# Patient Record
Sex: MALE | Race: WHITE | Employment: UNEMPLOYED | URBAN - METROPOLITAN AREA
[De-identification: names, ages, dates, MRNs, and addresses within clinical notes are randomized per-mention and may not be internally consistent; named-entity substitution may affect disease eponyms.]

---

## 2017-01-24 ENCOUNTER — TELEPHONE (OUTPATIENT)
Dept: PSYCHIATRY | Facility: CLINIC | Age: 22
End: 2017-01-24

## 2017-01-24 ENCOUNTER — OFFICE VISIT (OUTPATIENT)
Dept: PSYCHIATRY | Facility: CLINIC | Age: 22
End: 2017-01-24
Attending: PSYCHIATRY & NEUROLOGY
Payer: COMMERCIAL

## 2017-01-24 ENCOUNTER — OFFICE VISIT (OUTPATIENT)
Dept: PSYCHIATRY | Facility: CLINIC | Age: 22
End: 2017-01-24
Attending: PSYCHOLOGIST
Payer: COMMERCIAL

## 2017-01-24 VITALS — SYSTOLIC BLOOD PRESSURE: 106 MMHG | HEART RATE: 85 BPM | DIASTOLIC BLOOD PRESSURE: 74 MMHG | WEIGHT: 192.8 LBS

## 2017-01-24 DIAGNOSIS — F20.9 SCHIZOPHRENIA, UNSPECIFIED TYPE (H): Primary | ICD-10-CM

## 2017-01-24 PROCEDURE — 99212 OFFICE O/P EST SF 10 MIN: CPT | Mod: ZF

## 2017-01-24 NOTE — TELEPHONE ENCOUNTER
On 12/14/2016 the patient signed an JONNY authorizing Abhilash Lorenz MD to release/obtain all pertinent records for the purpose of continuing care. On 01/24/2017 I sent this JONNY to Medical Records via the [employer] system.  Ivonne Ball LPN

## 2017-01-24 NOTE — PROGRESS NOTES
First Episode Group       Client: Jasen Guillen III  : 1995  MRN: [id number]  Date of Service: 2017  Diagnosis: Mr. Guillen is being seen for a diagnosis of psychosis  Number of Participants: 3  Group Time: 60 minutes  Facilitators: Umu Bell, Ph. D., Howard Brizuela, Ph.D., Grace Stearns M.AMartin    The first episode group is based on the cognitive behavioral therapy model that uses psychoeducation, cognitive restructuring, problem solving techniques, and social skills.     Diagnostic criteria for group participation: History of psychosis    Group Topic for Today: Today we talked about delusions. Patients discussed the definition and different types of delusions and shared their experiences of delusions. The group explored how delusions may be an extension of common false beliefs and a reaction to emotions including anxiety and depression.    Description: Active Participant  Mr. Guillen was at ease and engaged other patients.    Plan: Continue with group goals to minimize impact of psychotic symptoms and maintain stability             The group will continue to talk about delusions and reality testing next week.

## 2017-01-24 NOTE — PROGRESS NOTES
"PSYCHIATRIC  DIAGNOSTIC  EVALUATION            90 minute evaluation    IDENTIFICATION   Jasen Guillen III is a 21 year old male who was self-referred for evaluation of schizophrenia.  History was provided by patient who was a good historian.       CHIEF COMPLAINT    \"I recently moved here and I have some questions about my medications.\"     HISTORY OF PRESENT ILLNESS     PSYCH CRITICAL ITEM HISTORY includes a history of psychotic symptoms (delusions of reference) starting at around age 18, with a diagnosis of schizophrenia made at the [facility] in [place]. He has been in successful treatment since that time and recently moved to the  to begin studies here at the [facility] in [employer]. Currently being treated with Invega sustenna-- dose unknown, last dose on Jan 8, plus invega 3 mg po qd and prn Cogentin for oculogyric spasms which occer 1-2 times per month.    PERTINENT BACKGROUND:      Pt began having functional difficulties in freshman year, was unable to complete college courses, and dropped out in sophomore year.. Difficulties appear to have been in part due to slowly growing delusional beliefs that songs, movies, ads, etc. Were making direct reference to him and communicating with him. Pt moved back in with parents who then became increasingly concerned  bout son's behavior. He was evaluated (psychiatrically and medically) at [facility] in [place], [place] .  He was hospitalized and evaluated for 1 week when he turned 20, and then entered residential treatment in [place] for one year where he stabilized on meds, and was engaged in group and individual psychotherapy.    MOST RECENT HISTORY began at around age 18, with sx apparently emerging slowly prior to that.    SUBSTANCE USE:     Denies all.  Financial Support- parents.  Living Situation- In dorm  Feels Safe at Home- yes.    MEDICAL ROS:  Reports occasional oculogyric symptoms, about once or twice per month, usually controlled by " "benztropine.  Denies muscle stiffness, Parkinsonian symptoms..    RECENT SYMPTOMS   [PSYCH ROS]     PANIC ATTACK OR PANIC SX:  None.  ANXIETY: None.  DEPRESSION:  DENIES- depressed mood, insomnia , appetite change, weight gain /loss, excessive guilt and feeling worthless   However, does note feelings of lack of motivation and lowered energy, though feels this might be due to meds and or schizoprhenia, rather than depression.  DYSREGULATION:  DENIES- over reactive, physically agitated, aggression, angry outbursts and violent behavior  PSYCHOSIS:  Patient reports that he had a past history of delusional beliefs, does not specify content today. Denies current delusional thinking; denies hallucinations, denies paranoia.  JOSE/HYPOMANIA:  Patient denies elevated mood, grandiosity, or excessive energy/activities.  Sleeping and eati g normally.  TRAUMA RELATED: No trauma sx.  EATING DISORDER:  No binge eating or food restriction.  COMPULSIVE:  Denies compulsive or repetitive behaviors.       PSYCHIATRIC HISTORY     SIB [method, most recent]-None  Suicidal Ideation Hx [passive, active]-None  Suicide Attempt [#, recent, method]:   #- N/A   Most Recent- N/A    Violence/Aggression Hx- none  Psychosis Hx- none  Psych Hosp See history noted above-- 1 prior hospitalization in [place] for \"not functioning at home and my parents wanted this figured out.\"    Outpatient Programs [ DBT, Day Treatment, Eating Disorder Tx etc] : Patient participated in a year-long residential treatment program in [place].    PAST MEDICATION TRIALS     Patient was stabilized on paliperidone palmitaie (Invega sustenna) during RTP and has been on it for the past year at a does of 234 mg per month supplemented by oral paliperidone at 3 mg po qd.      SUBSTANCE USE HISTORY                                                                 None    Legal Consequences- none    SOCIAL HISTORY                                                                      " patient reported   Financial Support- documented above  Living Situation/Family/Relationships- documented above  Trauma History (self-report)- None  Legal- None  Social/Spiritual Support-Patient has very good support from his family in [place] as well as Grand parents here.  Is developing a good relationship with his new roommate/suitemates. Also is involved in [employer] group here on campus.      FAMILY HISTORY                                                                       patient reported     Family Mental Health History-  unknown    MEDICAL / SURGICAL HISTORY                                   None    Neurologic Hx: unknown-- data to be gathered at next visit.   There is no problem list on file for this patient.         ALLERGY                                Review of patient's allergies indicates no known allergies.  MEDICATIONS                               No current outpatient prescriptions on file.       VITALS   /74 mmHg  Pulse 85  Wt 87.454 kg (192 lb 12.8 oz)   MENTAL STATUS EXAM                                                             Alertness: alert   Appearance: well groomed  Behavior/Demeanor: cooperative, pleasant and calm, with good  eye contact  Speech: normal and regular rate and rhythm  Language: intact  Psychomotor: normal or unremarkable  Mood:  description consistent with euthymia  Affect: full range; was congruent to mood;congruent to content  Thought Process/Associations: unremarkable, organized.  Thought Content:  Denies suicidal ideation, violent ideation and psychotic thought  Perception:  Denies auditory or visual hallucinations.  Insight: excellent  Judgment: excellent  Cognition:  does appear grossly intact; formal cognitive testing was not done    LABS and DATA   None performed.    AIMS:  N/A    PHQ9 TODAY = Not entered yet.    Psychotherapy services during this visit included myself and Jasen Guillen III.    Issues addressed in therapy included psychoeducation ab  out this phase of treatment, and discussion of non-medication treatment approaches to support recovery. They were addressed using primarily Supportive techniques.    Treatment plan was initiated at this visit, please see further documentation scanned into Epic chart.    Total time spent on psychotherapy services: 20 min    PSYCHIATRIC DIAGNOSES                                                                                                    Schizophrenia, early phase, in full recovery and doing very well overall, fully engaged in current treatment plan, with mild occasional oculogyric spasms secondary to antipsychotic medication.     ASSESSMENT                                           See 'Plan' section for specific dosing info             This patient is a 21 year old male who provides a history and with  mental status findings supporting the diagnoses listed directly above.  Further diagnostic clarification is not needed.  There are no medical comorbidities which impact this treatment.    Patient is very engaged and motivated to follow treatment recommendations, and interested in working with our interdisciplinary team to consolidate his reiovery.         PLAN                                                                                                       1) MEDICATION:      We will try and d/c oral paliperidone, to see if patient can be maintained with HARRELL only-- goal is to eliminate oculogyric sx.  Patient is agreable to this plan.  KNows to call us if psychotic sx re-emerge.    Will return in one week to assess how he is doing.    2) THERAPY:     Patient met with Howard Mcmahon, PhD, and discussed joining our Young Adults group. He will also consider individual psychotherapy.    He also met with Darrick MANRIQUE, and discussed developing his support system and relapse plan-- at next visit.    4) REFERRALS [CD, medical, other]:  none    5) :  none    6) RTC: One week.    7) CRISIS NUMBERS:  Provided routinely in AVS         TREATMENT RISK STATEMENT:  The risks, benefits, alternatives and potential adverse effects have been explained and are understood by the pt. The pt agrees to the treatment plan with the ability to do so. The pt knows to call the clinic for any problems or to access emergency care if needed.  Medical and CD concerns are documented above.  Psychotropic drug interaction check was done, including changes made today, and is discussed above.         Faculty: Lynnette Rios MD

## 2017-01-24 NOTE — TELEPHONE ENCOUNTER
On 01/23/2017 the patient signed a PHI authorizing phone messages to be left on cell phone ([phone]) regarding scheduling, medical and billing information.  The form also authorizes Person to Person communication with Jasen Guillen ., Father for information.  I sent this document to scanning on 01/24/2017 and kept a copy in Psychiatry until scanning is complete/confirmed. Ivonne Ball LPN

## 2017-01-25 DIAGNOSIS — F20.9 SCHIZOPHRENIA (H): Primary | ICD-10-CM

## 2017-01-25 ASSESSMENT — PATIENT HEALTH QUESTIONNAIRE - PHQ9: SUM OF ALL RESPONSES TO PHQ QUESTIONS 1-9: 9

## 2017-01-25 NOTE — PROGRESS NOTES
Pt is cleared to receive Invega Sustenna injection by [facility] staff, Karolina Jessica.  Will have injection appt scheduled for next Tuesday, 1/31/17 to coincide with his next psychiatry appt per Dr Rios's request.

## 2017-01-25 NOTE — PROGRESS NOTES
Received response from Dr Rios after [facility] contacted previous psychiatrist for verification of dose to enter prescription for Invega Sustenna 234 mg q 29 days, 3 R/F.  Notified Karolina Jessica that order is now present so insurances can be checked for coverage.

## 2017-01-27 PROBLEM — F20.9 SCHIZOPHRENIA, UNSPECIFIED TYPE (H): Status: ACTIVE | Noted: 2017-01-27

## 2017-01-31 ENCOUNTER — OFFICE VISIT (OUTPATIENT)
Dept: PSYCHIATRY | Facility: CLINIC | Age: 22
End: 2017-01-31
Attending: PSYCHIATRY & NEUROLOGY
Payer: COMMERCIAL

## 2017-01-31 ENCOUNTER — ALLIED HEALTH/NURSE VISIT (OUTPATIENT)
Dept: PSYCHIATRY | Facility: CLINIC | Age: 22
End: 2017-01-31

## 2017-01-31 ENCOUNTER — OFFICE VISIT (OUTPATIENT)
Dept: PSYCHIATRY | Facility: CLINIC | Age: 22
End: 2017-01-31
Attending: PSYCHOLOGIST
Payer: COMMERCIAL

## 2017-01-31 VITALS — SYSTOLIC BLOOD PRESSURE: 109 MMHG | HEART RATE: 70 BPM | DIASTOLIC BLOOD PRESSURE: 75 MMHG | WEIGHT: 190 LBS

## 2017-01-31 DIAGNOSIS — F20.9 SCHIZOPHRENIA, UNSPECIFIED TYPE (H): Primary | ICD-10-CM

## 2017-01-31 DIAGNOSIS — Z71.89 COUNSELING AND COORDINATION OF CARE: Primary | ICD-10-CM

## 2017-01-31 PROCEDURE — 99212 OFFICE O/P EST SF 10 MIN: CPT | Mod: ZF

## 2017-01-31 NOTE — PROGRESS NOTES
First Episode Group       Client: Jasen Guillen III  : 1995  MRN: [id number]  Date of Service: 2017  Diagnosis: Mr. Wilmer MAURICIO is being seen for a diagnosis of psychosis  Number of Participants: 8  Group Time: 60 minutes  Facilitators: Roque Hernández, Howard JEFFERS, Ph.D., Grace Stearns, M.A.    The first episode group is based on the cognitive behavioral therapy model that uses psychoeducation, cognitive restructuring, problem solving techniques, and social skills.     Diagnostic criteria for group participation: History of psychosis    Group Topic for Today: Today we talked about reality testing. Patients shared their methods of reality testing including checking in with trusted ones, not acting upon a delusion and seeing what happens, avoiding over interpretation of hallucinations and incidences, etc.     Description: Active Participant  Mr. Guillen was at ease and engaged other group members.    Plan: Continue with group goals to minimize impact of psychotic symptoms and maintain stability      I saw the patient with the psychotherapy trainee and participated in the service.  I agree with the findings and plan as documented in this note.    Fina Ceron

## 2017-01-31 NOTE — NURSING NOTE
Chief Complaint   Patient presents with     Recheck Medication     schizophrenia     Reviewed allergies, smoking status, and pharmacy preference  Administered abuse screening questions   Obtained weight, blood pressure and heart rate

## 2017-01-31 NOTE — MR AVS SNAPSHOT
After Visit Summary   2017    Jasen Guillen III    MRN: [id number]           Patient Information     Date Of Birth          1995        Visit Information        Provider Department      2017 10:30 AM Lynnette Rios MD Psychiatry Clinic        Today's Diagnoses     Schizophrenia, unspecified type (H)    -  1       Follow-ups after your visit        Your next 10 appointments already scheduled     Mar 14, 2017 10:10 AM CDT   First Episode Return with Lynnette Rios MD   Psychiatry Clinic ([facility])    [place] [id number]  [address] [id number] [phone]              Who to contact     Please call your clinic at [phone] to:    Ask questions about your health    Make or cancel appointments    Discuss your medicines    Learn about your test results    Speak to your doctor   If you have compliments or concerns about an experience at your clinic, or if you wish to file a complaint, please contact [facility] Patient Relations at [phone] or email us at [email or web address]         Additional Information About Your Visit        MyChart Information     Medik[employer]z is an electronic gateway that provides easy, online access to your medical records. With Medikidz, you can request a clinic appointment, read your test results, renew a prescription or communicate with your care team.     To sign up for [employer] visit the website at [email or web address]   You will be asked to enter the access code listed below, as well as some personal information. Please follow the directions to create your username and password.     Your access code is: [email or web address]  Expires: 2017 10:48 AM     Your access code will  in 90 days. If you need help or a new code, please contact your [facility] or call [phone] for assistance.        Care EveryWhere ID     This  is your Care EveryWhere ID. This could be used by other organizations to access your [place] medical records  [id number]        Your Vitals Were     Pulse                   70            Blood Pressure from Last 3 Encounters:   01/31/17 109/75   01/24/17 106/74    Weight from Last 3 Encounters:   01/31/17 86.2 kg (190 lb)   01/24/17 87.5 kg (192 lb 12.8 oz)              Today, you had the following     No orders found for display       Primary Care Provider    None       No address on file        Thank you!     Thank you for choosing PSYCHIATRY CLINIC  for your care. Our goal is always to provide you with excellent care. Hearing back from our patients is one way we can continue to improve our services. Please take a few minutes to complete the written survey that you may receive in the mail after your visit with us. Thank you!             Your Updated Medication List - Protect others around you: Learn how to safely use, store and throw away your medicines at www.disposemymeds.org.          This list is accurate as of: 1/31/17 11:59 PM.  Always use your most recent med list.                   Brand Name Dispense Instructions for use    paliperidone 234 MG/1.5ML Susp    INVEGA SUSTENNA    1.5 mL    Inject 1.5 mLs (234 mg) into the muscle every 28 days

## 2017-01-31 NOTE — PROGRESS NOTES
Met with Jasen (Gato) to introduce relapse prevention planning, and provided him with a set of questions to assist him in developing a first draft on his own.  He indicated he does not currently have a plan, and he has not ever completed one in the past.  Gato indicated he would like to see me again for assistance with developing, so we agreed to meet after his next appointment with Dr. Rios in two weeks.       Following our visit, the [employer] Supportive Education and  Monica Ramirez met with Gato and discussed information and resources specific to disability services and accomodations at the [employer], where he attends school.      Darrick Gomez, [facility]  Director/Family Clinician-[facility]  Department of Psychiatry-[facility]          I saw the patient with the [employer]IG[employer] team staff member, and participated in key portions of the service, including the mental status examination and developing the plan of care. I reviewed key portions of the history . I agree with the findings and plan as documented in this note.    Lynnette Rios

## 2017-02-01 ASSESSMENT — PATIENT HEALTH QUESTIONNAIRE - PHQ9: SUM OF ALL RESPONSES TO PHQ QUESTIONS 1-9: 2

## 2017-02-07 ENCOUNTER — OFFICE VISIT (OUTPATIENT)
Dept: PSYCHIATRY | Facility: CLINIC | Age: 22
End: 2017-02-07
Attending: PSYCHOLOGIST
Payer: COMMERCIAL

## 2017-02-07 DIAGNOSIS — F20.9 SCHIZOPHRENIA, UNSPECIFIED TYPE (H): Primary | ICD-10-CM

## 2017-02-07 NOTE — PROGRESS NOTES
First Episode Group       Client: Jasen Guillen III  : 1995  MRN: [id number]  Date of Service: 2017  Diagnosis: Mr. Wilmer MAURICIO is being seen for a diagnosis of schizophrenia, unspecified type, F20.9  Number of Participants: 4  Group Time: 60 minutes  Facilitators: Umu Bell, Ph. D., Howard Brizuela, Ph.D., Grace Stearns M.AMartin    The first episode group is based on the cognitive behavioral therapy model that uses psychoeducation, cognitive restructuring, problem solving techniques, and social skills.     Diagnostic criteria for group participation: History of psychosis    Group Topic for Today: Today we talked about the purposes and benefits of group therapy. Patients compared and contrasted group therapy with individual therapy and psychiatric visits. Patients shared that they appreciate the opportunity to get reassurance, relate to others, be understood, and help others in group therapy.    Description: Attentive     Mr. Guillen was engaged in the group milieu and participated in the discussion.    Plan: Continue with group goals to minimize impact of psychotic symptoms and maintain stability

## 2017-02-14 NOTE — PROGRESS NOTES
"  PSYCHIATRY CLINIC PROGRESS NOTE       INTERIM HISTORY                                                 PSYCH CRITICAL ITEM HISTORY  Jasen Guillen III is a 21 year old male who comes in for a scheduled visit today to evaluate how he is doing with a reduction in his oral paliperidone.    Patient stopped the paliperidone 3 mg po qd 2 weeks ago to see if this would help with sx of sedation, low energy, amotivation, and oculogyric spasms.    Patient reports that he feels much better over the past week or so-- energy and focus are improved, and he feels less \"drugged out\" during the day. No muscle or facial side effects.    Started [facility] group last week and participated very actively-- enjoyed it.        MEDICAL ROS:   Denies problems with salivation, muscle stiffness.    PSYCH and CD Critical Summary Points since July 2016           None.        Patient Active Problem List   Diagnosis     Schizophrenia, unspecified type (H)            ALLERGY                                Review of patient's allergies indicates no known allergies.  MEDICATIONS                               Current Outpatient Prescriptions   Medication Sig Dispense Refill     paliperidone (INVEGA SUSTENNA) 234 MG/1.5ML SUSP Inject 1.5 mLs (234 mg) into the muscle every 28 days 1.5 mL 3        VITALS   /75  Pulse 70  Wt 86.2 kg (190 lb)   MENTAL STATUS EXAM                                                             Alertness: Alert, oriented.  Appearance: Neatly and casually dressed.  Behavior/Demeanor: Calm, cooperative, pleasant, with excellent eye contact.  Speech: Normal rate and rhythm.  Language: Language is cogent, descriptive of current improvement in how he is feeling.  Psychomotor: Normal PM speed. No slowing observed.  Mood:  \"Good.\"  Affect: full range, appropriate.  Thought Process/Associations: Linear, logical, feeling optiistic about current semester.  Thought Content: Denies delusional thoughts, ideas of " "reference.  Perception:  Denies hallucinations  Insight: Excellent.  Judgment: Excellent  Cognition:  Intact    LABS and DATA         PHQ9 TODAY = 2  PHQ-9 SCORE 1/24/2017 1/31/2017   Total Score 9 2         DIAGNOSIS     Schizophrenia     ASSESSMENT                                     Patient doing well, having tapered off of oral paliperidone 3 mg po qd, will be due for monthly injection of 234 mg next week.    Is experiencing better energy, better sense of motivation, no psychotic sx.     PLAN                                                                                                       1) PSYCHOTROPIC MEDICATIONS:   Injectable paliperidone 234 mg po q month.    2) THERAPY:      Will continue Young Adults group once per month.    Interested in individual psychotherapy-- will refer to Howard Mcmahon, PhD for evaluation for individual sessions.    Will work with Darrick MANRIQUE to develop Relapse Plan.      3) RTC: Feb 7 for monthly injection.      4) CRISIS NUMBERS:   Provided routinely in AVS        TREATMENT RISK STATEMENT:  The risks, benefits, alternatives and potential adverse effects have been discussed and are understood by the patient/ patient's guardian. The pt understands the risks of using street drugs or alcohol.  There are no medical contraindications, the pt agrees to treatment with the ability to do so.  The patient understands to call 911 or come to the nearest ED if life threatening or urgent symptoms present.    PSYCHIATRY CLINIC INDIVIDUAL PSYCHOTHERAPY NOTE   Length of session: 20 minutes  Date of most recent treatment plan: 01-31-17  Date next due treatment plan: 04-30-17    Subjective: \"I am feeling much more motivated today. I would like to consider individual therapy. Classes are going well so far also.     Plan:    Continue supportive psychotherapy during monthly medication management visits.   Refer for indiviiual psychotherapy.   Continue Young Adults Group    Psychotherapy services " during this visit included  myself and Jasen Guillen.  Issues addressed in therapy included: how to strike a balance between staying active and not over-committing, now that classes are underway and he is feeling more energy--  and were addressed using primarily psychodynamic, supportive and CBT techniques.        TREATMENT  PLAN          SYMPTOMS;PROBLEMS   MEASURABLE GOALS;    FUNCTIONAL IMPROVEMENT INTERVENTIONS;    GAINS MADE DISCHARGE CRITERIA   Low energy, lack of motivation Patient will experience a significant improvement in energy and motivation, and will be able to engage in outsidesocializing activities as well as classwork Supportive Psychotherapy once per month    Individual psychotherapy, once per week Ongoing engagement with social activities   Ideas of reference during times of stress Patient will no longer experience ideas of reference Monthly injectable paliperidone Two years without symptoms, followed by two years of monitoring without symptoms relapse   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       Faculty: Lynnette Rios M.D.

## 2017-02-21 ENCOUNTER — OFFICE VISIT (OUTPATIENT)
Dept: PSYCHIATRY | Facility: CLINIC | Age: 22
End: 2017-02-21
Attending: PSYCHOLOGIST
Payer: COMMERCIAL

## 2017-02-21 DIAGNOSIS — F20.9 SCHIZOPHRENIA, UNSPECIFIED TYPE (H): Primary | ICD-10-CM

## 2017-02-21 NOTE — MR AVS SNAPSHOT
After Visit Summary   2017    Jasen Guillen III    MRN: [id number]           Patient Information     Date Of Birth          1995        Visit Information        Provider Department      2017 3:00 PM Umu Bell, PhD  Psychiatry Clinic        Today's Diagnoses     Schizophrenia, unspecified type (H)    -  1       Follow-ups after your visit        Your next 10 appointments already scheduled     Mar 14, 2017 10:10 AM CDT   First Episode Return with Lynnette Rios MD   Psychiatry Clinic ([facility])    [address]  [address] [facility] [id number] [phone]              Who to contact     Please call your clinic at [phone] to:    Ask questions about your health    Make or cancel appointments    Discuss your medicines    Learn about your test results    Speak to your doctor   If you have compliments or concerns about an experience at your clinic, or if you wish to file a complaint, please contact [facility] Patient Relations at [phone] or email us at [email or web address]         Additional Information About Your Visit        MyChart Information     Cards Offt is an electronic gateway that provides easy, online access to your medical records. With mNectar, you can request a clinic appointment, read your test results, renew a prescription or communicate with your care team.     To sign up for Cards Offt visit the website at [email or web address]   You will be asked to enter the access code listed below, as well as some personal information. Please follow the directions to create your username and password.     Your access code is: [email or web address]  Expires: 2017 10:48 AM     Your access code will  in 90 days. If you need help or a new code, please contact your [facility] or call [phone] for assistance.        Care EveryWhere  ID     This is your Care EveryWhere ID. This could be used by other organizations to access your [place] medical records  [id number]         Blood Pressure from Last 3 Encounters:   01/31/17 109/75   01/24/17 106/74    Weight from Last 3 Encounters:   01/31/17 86.2 kg (190 lb)   01/24/17 87.5 kg (192 lb 12.8 oz)              Today, you had the following     No orders found for display       Primary Care Provider    None       No address on file        Thank you!     Thank you for choosing PSYCHIATRY CLINIC  for your care. Our goal is always to provide you with excellent care. Hearing back from our patients is one way we can continue to improve our services. Please take a few minutes to complete the written survey that you may receive in the mail after your visit with us. Thank you!             Your Updated Medication List - Protect others around you: Learn how to safely use, store and throw away your medicines at www.disposemymeds.org.          This list is accurate as of: 2/21/17 11:59 PM.  Always use your most recent med list.                   Brand Name Dispense Instructions for use    paliperidone 234 MG/1.5ML Susp    INVEGA SUSTENNA    1.5 mL    Inject 1.5 mLs (234 mg) into the muscle every 28 days

## 2017-02-22 NOTE — PROGRESS NOTES
First Episode Group       Client: Jasen Guillen III  : 1995  MRN: [id number]  Date of Service: 2017  Diagnosis: Mr. Guillen is being seen for a diagnosis of psychosis  Number of Participants: 9  Group Time: 60 minutes  Facilitators: Umu Bell, Ph. D., Howard Brizuela, Ph.D., Grace Stearns M.AMartin    The first episode group is based on the cognitive behavioral therapy model that uses psychoeducation, cognitive restructuring, problem solving techniques, and social skills.     Diagnostic criteria for group participation: History of psychosis    Group Topic for Today: Today we talked about reality testing. Patients shared their coping skills for hallucinations and delusions including taking medication, checking in with trusted people, and meditation. The group practiced coming up with alternative thoughts for psychotic symptoms.    Description: Attentive     Mr. Guillen was engaged in the group milieu and participated in the discussion.    Plan: Continue with group goals to minimize impact of psychotic symptoms and maintain stability    I saw the patient with the psychotherapy trainee and participated in the service.  I agree with the findings and plan as documented in this note.    Umu Bell

## 2017-02-28 ENCOUNTER — OFFICE VISIT (OUTPATIENT)
Dept: PSYCHIATRY | Facility: CLINIC | Age: 22
End: 2017-02-28
Attending: PSYCHOLOGIST
Payer: COMMERCIAL

## 2017-02-28 DIAGNOSIS — F20.9 SCHIZOPHRENIA, UNSPECIFIED TYPE (H): Primary | ICD-10-CM

## 2017-02-28 NOTE — MR AVS SNAPSHOT
After Visit Summary   2017    Jasen Guillen III    MRN: [id number]           Patient Information     Date Of Birth          1995        Visit Information        Provider Department      2017 3:00 PM Sera Landrum, PhD Psychiatry Clinic        Today's Diagnoses     Schizophrenia, unspecified type (H)    -  1       Follow-ups after your visit        Your next 10 appointments already scheduled     Mar 14, 2017 10:10 AM CDT   First Episode Return with Lynnette Rios MD   Psychiatry Clinic ([facility])    [address]  [address] [facility] [phone]              Who to contact     Please call your clinic at [phone] to:    Ask questions about your health    Make or cancel appointments    Discuss your medicines    Learn about your test results    Speak to your doctor   If you have compliments or concerns about an experience at your clinic, or if you wish to file a complaint, please contact [facility] Patient Relations at [phone] or email us at [email or web address]         Additional Information About Your Visit        MyChart Information     WizRocket Technologies is an electronic gateway that provides easy, online access to your medical records. With WizRocket Technologies, you can request a clinic appointment, read your test results, renew a prescription or communicate with your care team.     To sign up for Metwitt visit the website at [email or web address]   You will be asked to enter the access code listed below, as well as some personal information. Please follow the directions to create your username and password.     Your access code is: [email or web address]  Expires: 2017 10:48 AM     Your access code will  in 90 days. If you need help or a new code, please contact your [facility] or call [phone] for assistance.        Care EveryWhere ID     This  is your Care EveryWhere ID. This could be used by other organizations to access your [place] medical records  [id number]         Blood Pressure from Last 3 Encounters:   01/31/17 109/75   01/24/17 106/74    Weight from Last 3 Encounters:   01/31/17 86.2 kg (190 lb)   01/24/17 87.5 kg (192 lb 12.8 oz)              Today, you had the following     No orders found for display       Primary Care Provider    None       No address on file        Thank you!     Thank you for choosing PSYCHIATRY CLINIC  for your care. Our goal is always to provide you with excellent care. Hearing back from our patients is one way we can continue to improve our services. Please take a few minutes to complete the written survey that you may receive in the mail after your visit with us. Thank you!             Your Updated Medication List - Protect others around you: Learn how to safely use, store and throw away your medicines at www.disposemymeds.org.          This list is accurate as of: 2/28/17 11:59 PM.  Always use your most recent med list.                   Brand Name Dispense Instructions for use    paliperidone 234 MG/1.5ML Susp    INVEGA SUSTENNA    1.5 mL    Inject 1.5 mLs (234 mg) into the muscle every 28 days

## 2017-03-01 NOTE — PROGRESS NOTES
First Episode Group       Client: Jasen Guillen III  : 1995  MRN: [id number]  Date of Service: 2017  Diagnosis: Mr. Wilmer MAURICIO is being seen for a diagnosis of psychosis  Number of Participants: 9  Group Time: 60 minutes  Facilitators: Roque Beckham, Howard Brizuela, Ph.D., Grace Stearns M.AMartin    The first episode group is based on the cognitive behavioral therapy model that uses psychoeducation, cognitive restructuring, problem solving techniques, and social skills.     Diagnostic criteria for group participation: History of psychosis    Group Topic for Today: Today, the group learned about mindfulness with guest speaker, [facility]. The group completed two mindfulness exercises, guided breathing and body scanning. Patients shared their experience of the exercises and discussed how one can use them to cope with psychotic symptoms as well as stress.    Description: Active Participant  Mr. Guillen was at ease and engaged in the discussion.    Plan: Continue with group goals to minimize impact of psychotic symptoms and maintain stability    I saw the patient with the psychotherapy trainee and participated in the service. I agree with the findings and plan as documented in this note. Sera Landrum Psy.D., L.P.

## 2017-03-09 ENCOUNTER — TELEPHONE (OUTPATIENT)
Dept: PSYCHIATRY | Facility: CLINIC | Age: 22
End: 2017-03-09

## 2017-03-09 NOTE — TELEPHONE ENCOUNTER
"Per review of EMR upcoming appts, writer noticed that this pt is due for Invega Sustenna injection ~3/7/17.    Per last injection note from [facility] dated 2/7/17 \"Before we could get the next injection scheduled pt had left the clinic.  Will request that scheduling connect with him to schedule next appt on 3/9/17.Next appt: Due on 3/7/17 but to accommodate nursing schedules will need to change this to 3/9/17.\"    Pt is not currently scheduled for an injection.    LVM on pt's phone to c/b.  "

## 2017-03-13 NOTE — TELEPHONE ENCOUNTER
Pt has not returned call to schedule injection.  Will likely receive this following appt tomorrow with Dr. Rios and writer will need dose confirmed prior to administering.    Msg sent to scheduling to enter injection appt.    Routed to Dr. Rios as TAVONI.

## 2017-03-14 ENCOUNTER — OFFICE VISIT (OUTPATIENT)
Dept: PSYCHIATRY | Facility: CLINIC | Age: 22
End: 2017-03-14
Attending: SOCIAL WORKER
Payer: COMMERCIAL

## 2017-03-14 ENCOUNTER — TELEPHONE (OUTPATIENT)
Dept: PSYCHIATRY | Facility: CLINIC | Age: 22
End: 2017-03-14

## 2017-03-14 ENCOUNTER — ALLIED HEALTH/NURSE VISIT (OUTPATIENT)
Dept: PSYCHIATRY | Facility: CLINIC | Age: 22
End: 2017-03-14
Attending: PSYCHIATRY & NEUROLOGY
Payer: COMMERCIAL

## 2017-03-14 VITALS — HEART RATE: 65 BPM | SYSTOLIC BLOOD PRESSURE: 108 MMHG | WEIGHT: 188.6 LBS | DIASTOLIC BLOOD PRESSURE: 70 MMHG

## 2017-03-14 DIAGNOSIS — F20.9 SCHIZOPHRENIA, UNSPECIFIED TYPE (H): Primary | ICD-10-CM

## 2017-03-14 DIAGNOSIS — Z79.899 ENCOUNTER FOR LONG-TERM (CURRENT) USE OF MEDICATIONS: ICD-10-CM

## 2017-03-14 DIAGNOSIS — F33.1 MODERATE EPISODE OF RECURRENT MAJOR DEPRESSIVE DISORDER (H): ICD-10-CM

## 2017-03-14 PROCEDURE — 25000128 H RX IP 250 OP 636: Mod: ZF

## 2017-03-14 PROCEDURE — 99212 OFFICE O/P EST SF 10 MIN: CPT

## 2017-03-14 PROCEDURE — 96372 THER/PROPH/DIAG INJ SC/IM: CPT | Mod: ZF

## 2017-03-14 NOTE — MR AVS SNAPSHOT
After Visit Summary   3/14/2017    Jasen Guillen III    MRN: [id number]           Patient Information     Date Of Birth          1995        Visit Information        Provider Department      3/14/2017 10:10 AM Lynnette Rios MD Psychiatry Clinic        Today's Diagnoses     Schizophrenia, unspecified type (H)    -  1    Moderate episode of recurrent major depressive disorder (H)           Follow-ups after your visit        Your next 10 appointments already scheduled     May 11, 2017  1:00 PM CDT   Nurse Visit with [facility] Psychiatry Nurse   Psychiatry Clinic ([facility])    [address]  [address] [facility] [address] [phone]              Who to contact     Please call your clinic at [phone] to:    Ask questions about your health    Make or cancel appointments    Discuss your medicines    Learn about your test results    Speak to your doctor   If you have compliments or concerns about an experience at your clinic, or if you wish to file a complaint, please contact [facility] Patient Relations at [phone] or email us at [email or web address]         Additional Information About Your Visit        MyChart Information     Bergen Medical Productst is an electronic gateway that provides easy, online access to your medical records. With Preventice, you can request a clinic appointment, read your test results, renew a prescription or communicate with your care team.     To sign up for Bergen Medical Productst visit the website at [email or web address]   You will be asked to enter the access code listed below, as well as some personal information. Please follow the directions to create your username and password.     Your access code is: [email or web address]  Expires: 2017 11:48 AM     Your access code will  in 90 days. If you need help or a new code, please contact your [facility] or call  [phone] for assistance.        Care EveryWhere ID     This is your Care EveryWhere ID. This could be used by other organizations to access your [place] medical records  [id number]        Your Vitals Were     Pulse                   65            Blood Pressure from Last 3 Encounters:   04/18/17 114/74   04/04/17 109/73   03/14/17 108/70    Weight from Last 3 Encounters:   04/18/17 81.9 kg (180 lb 9.6 oz)   04/04/17 83.7 kg (184 lb 9.6 oz)   03/14/17 85.5 kg (188 lb 9.6 oz)              Today, you had the following     No orders found for display       Primary Care Provider    None       No address on file        Thank you!     Thank you for choosing PSYCHIATRY CLINIC  for your care. Our goal is always to provide you with excellent care. Hearing back from our patients is one way we can continue to improve our services. Please take a few minutes to complete the written survey that you may receive in the mail after your visit with us. Thank you!             Your Updated Medication List - Protect others around you: Learn how to safely use, store and throw away your medicines at www.disposemymeds.org.          This list is accurate as of: 3/14/17 11:59 PM.  Always use your most recent med list.                   Brand Name Dispense Instructions for use    paliperidone 234 MG/1.5ML Susp    INVEGA SUSTENNA    1.5 mL    Inject 1.5 mLs (234 mg) into the muscle every 28 days

## 2017-03-14 NOTE — MR AVS SNAPSHOT
After Visit Summary   3/14/2017    Jasen Guillen III    MRN: [id number]           Patient Information     Date Of Birth          1995        Visit Information        Provider Department      3/14/2017 11:00 AM Nurse, [facility] Psychiatry Psychiatry Clinic        Today's Diagnoses     Schizophrenia, unspecified type (H)    -  1    Encounter for long-term (current) use of medications           Follow-ups after your visit        Your next 10 appointments already scheduled     2017 10:30 AM CDT   Nurse Visit with [facility] Psychiatry Nurse   Psychiatry Clinic ([facility])    [address]  [address] [facility] [phone]              Who to contact     Please call your clinic at [phone] to:    Ask questions about your health    Make or cancel appointments    Discuss your medicines    Learn about your test results    Speak to your doctor   If you have compliments or concerns about an experience at your clinic, or if you wish to file a complaint, please contact [facility] Patient Relations at [phone] or email us at [email or web address]         Additional Information About Your Visit        MyChart Information     AmigoCAT is an electronic gateway that provides easy, online access to your medical records. With AmigoCAT, you can request a clinic appointment, read your test results, renew a prescription or communicate with your care team.     To sign up for Emergency CallWorkst visit the website at [email or web address]   You will be asked to enter the access code listed below, as well as some personal information. Please follow the directions to create your username and password.     Your access code is: [email or web address]  Expires: 2017 11:48 AM     Your access code will  in 90 days. If you need help or a new code, please contact your [facility] or call [phone] for  assistance.        Care EveryWhere ID     This is your Care EveryWhere ID. This could be used by other organizations to access your [place] medical records  [id number]         Blood Pressure from Last 3 Encounters:   03/14/17 108/70   01/31/17 109/75   01/24/17 106/74    Weight from Last 3 Encounters:   03/14/17 85.5 kg (188 lb 9.6 oz)   01/31/17 86.2 kg (190 lb)   01/24/17 87.5 kg (192 lb 12.8 oz)              Today, you had the following     No orders found for display       Primary Care Provider    None       No address on file        Thank you!     Thank you for choosing PSYCHIATRY CLINIC  for your care. Our goal is always to provide you with excellent care. Hearing back from our patients is one way we can continue to improve our services. Please take a few minutes to complete the written survey that you may receive in the mail after your visit with us. Thank you!             Your Updated Medication List - Protect others around you: Learn how to safely use, store and throw away your medicines at www.disposemymeds.org.          This list is accurate as of: 3/14/17 11:48 AM.  Always use your most recent med list.                   Brand Name Dispense Instructions for use    paliperidone 234 MG/1.5ML Susp    INVEGA SUSTENNA    1.5 mL    Inject 1.5 mLs (234 mg) into the muscle every 28 days

## 2017-03-14 NOTE — MR AVS SNAPSHOT
After Visit Summary   3/14/2017    Jasen Guillen III    MRN: [id number]           Patient Information     Date Of Birth          1995        Visit Information        Provider Department      3/14/2017 9:15 AM Светлана Cardoso LGSW Psychiatry Clinic        Today's Diagnoses     Schizophrenia, unspecified type (H)    -  1       Follow-ups after your visit        Your next 10 appointments already scheduled     Apr 04, 2017 10:10 AM CDT   First Episode Return with Lynnette Rios MD   Psychiatry Clinic ([facility])    [place] [id number] [address] [facility] [phone]            Apr 13, 2017 10:30 AM CDT   Nurse [facility] with [email or web address] Psychiatry Nurse   Psychiatry Clinic ([facility])    [place] [id number] [address] [facility] [id number] [phone]              Who to contact     Please call your clinic at [phone] to:    Ask questions about your health    Make or cancel appointments    Discuss your medicines    Learn about your test results    Speak to your doctor   If you have compliments or concerns about an experience at your clinic, or if you wish to file a complaint, please contact [facility] Patient Relations at [phone] or email us at [email or web address]         Additional Information About Your Visit        MyChart Information     SoBiz10 is an electronic gateway that provides easy, online access to your medical records. With SoBiz10, you can request a clinic appointment, read your test results, renew a prescription or communicate with your care team.     To sign up for Startup Villaget visit the website at [email or web address]   You will be asked to enter the access code listed below, as well as some personal information. Please follow the directions to create your username and password.     Your access code is: [email or web address]  Expires: 4/30/2017  11:48 AM     Your access code will  in 90 days. If you need help or a new code, please contact your [facility] or call [phone] for assistance.        Care EveryWhere ID     This is your Care EveryWhere ID. This could be used by other organizations to access your [place] medical records  [id number]         Blood Pressure from Last 3 Encounters:   17 108/70   17 109/75   17 106/74    Weight from Last 3 Encounters:   17 85.5 kg (188 lb 9.6 oz)   17 86.2 kg (190 lb)   17 87.5 kg (192 lb 12.8 oz)              Today, you had the following     No orders found for display       Primary Care Provider    None       No address on file        Thank you!     Thank you for choosing PSYCHIATRY CLINIC  for your care. Our goal is always to provide you with excellent care. Hearing back from our patients is one way we can continue to improve our services. Please take a few minutes to complete the written survey that you may receive in the mail after your visit with us. Thank you!             Your Updated Medication List - Protect others around you: Learn how to safely use, store and throw away your medicines at www.disposemymeds.org.          This list is accurate as of: 3/14/17 11:59 PM.  Always use your most recent med list.                   Brand Name Dispense Instructions for use    paliperidone 234 MG/1.5ML Susp    INVEGA SUSTENNA    1.5 mL    Inject 1.5 mLs (234 mg) into the muscle every 28 days

## 2017-03-14 NOTE — NURSING NOTE
"OBSERVATIONS    Prior to administration pt identified by name and :  Yes    1.  Appearance:  Casual dress and weather appropriate. Mildly malodorous.   2.  Mood:  \"Good\"  3.  Affect:  Mildly restricted  4.  Interactions:  Appropriate.  Pt left clinic following appt with provider, reports to have forgotten about injection appt.  Discussed importance of scheduling these in advance and appt reminders.  Pt reports that visit with provider went well, denied any med changes made.    5.  Eye contact:  Good  6.  Side Effects:  Mentions oculogyric crisis occurring ~ 1 weekly. Inquired if pt was using Cogentin regularly, denies this and states that he takes it as PRN. Pt not certain if med is effective as PRN, states that it is a low dose.  Mentions that if he is to take this scheduled, will need a new prescription to [facility]. Writer stated would verify dose with provider and submit prescription.  7.  Education:  Encourage pt to call clinic with concerns PRN  8.  Level of Cooperation:  Full  9.  Compliance:  Full  10.  Next appointment:  17- injection, provider appt TBD        "

## 2017-03-14 NOTE — PROGRESS NOTES
Clinician Contact & Progress Note For Individual Resiliency Treatment (IRT)      Participant ID #: Jasen Guillen III    Date: 3/14/2017  Diagnosis: Schizophrenia, unspecified type (H) F20.9  Clinician: Светлана MANRIQUE  Other Staff Attending: BURTON Nobles      1. Type of contact: (Choose the one with majority of time spent on)     IRT Session     2. People present: (check all that apply)     Client   IRT clinicians (Pardeep)    3. Total number of persons who participated in contact: 3    4. Length of Actual Contact: 45 minutes   Record Minutes Travel Time: 0 minutes    5. Location of contact:(select from drop down menu)     Agency - [facility]    THE FOLLOWING QUESTIONS SHOULD BE COMPLETED AFTER EACH IRT SESSION    6. Did the client complete the home practice option from the previous session:  (select from drop down menu)     NA    7. Motivational Teaching Strategies:    Connect info and skills with personal goals.  Promote hope and positive expectations.  Re-frame experiences in positive light.    8. Educational Teaching Strategies:     Review of written material/education   Relate information to client's experience   Ask questions to check comprehension   Break down information into small chunks   Adopt client's language    9. CBT Teaching Strategies:    Social skills training (rationale for skill, modeling, role play practice, feedback, plan home practice)    Relapse prevention planning (review of stressors, early warning signs, written plan to respond to signs, rehearse plan)    Coping skills training (review current coping skills, increase currently used skills, model new skill, role play new skill, feedback, plan home practice)    10. Module(s) Addressed:     Module 4 - Relapse Prevention Planning      11. Techniques utilized: (choose all that apply)    X -[place] announced at beginning of session  NA- Review of homework  X -Review of goal  NA- Review of previous meeting  X - Present  new material       Role-play       Problem-solving practice  X-  Help client choose a home practice option  X -Summarize progress made in current session      12. Mental Status Exam  MENTAL STATUS: alert, interactive and alert and oriented x 3:    13. Progress Note: Writer met with client to complete Module 4, Relapse Prevention Planning. IRT clinician, BURTON Nobles, was also present. Client engaged in relapse prevention planning, identified early warning signs and triggers, developed coping strategies, and plan to share plan with Dad, psychiatrist, and friend. Client indicated social withdrawal and increase in ideas of reference as early warning signs. Reported triggers as increase in stress, stopping medications, and isolation. Identified coping skills as talking to Dad, doctor, and friend as well as taking a break by going for a walk outside. Identified people to be contacted in emergency as his Dad, Mom, and psychiatrist.     14. Plan/Referrals: Client reported plan to share his Relapse Prevention Plan with his Dad, doctor, and friend. He also reported plan to review plan regularly and make adjustments as needed.     Attestation:    I did not see this patient directly. This patient is discussed with me in individual clinical social work supervision, and I agree with the plan as documented.     HELIO Álvarez, [facility], March 22, 2017

## 2017-03-14 NOTE — TELEPHONE ENCOUNTER
Rec'isidoro ARIAS from Dr. Rios that pt is to continue on Invega Sustenna 234 mg IM Q 28 days.  Pt scheduled to receive injection following appt with provider today.

## 2017-03-14 NOTE — TELEPHONE ENCOUNTER
During injection appt, pt reports that he may need a new Rx for Cogentin.  Was uncertain of current dosing recommendations from provider.  Is currently using 0.25 mg QD PRN- oculogyric crisis.  Writer stated would verify dose with provider and assist with prescribing.  Pt reports to use [facility].    Writer attempted to reach [place] to inquire about last prescription, however pharmacy is closed.  Will c/b tomorrow.

## 2017-03-15 NOTE — TELEPHONE ENCOUNTER
Called [facility] and was informed that pt does not have a current profile as has not previously rec'd prescriptions from this location.    LVM for pt to c/b

## 2017-03-27 ENCOUNTER — TELEPHONE (OUTPATIENT)
Dept: PSYCHIATRY | Facility: CLINIC | Age: 22
End: 2017-03-27

## 2017-03-27 NOTE — TELEPHONE ENCOUNTER
"From: Renetta Racheal  Sent: Tuesday, March 21, 2017 8:26 AM  To: Gato Guillen  Subject: Re: Thank you thank you thank you!     Of course!  I am with her today.  Thank you so much for the kind words.  We hope to give Gato the support he needs to find stability.     -Renetta Springer [facility]      From: Gato Guillen <[email or web address]>  Sent: Tuesday, March 21, 2017 8:25:12 AM  To: Renetta Racheal  Subject: Re: Thank you thank you thank you!    Ms. Springer Would you mind forwarding this to Dr. Rios?  I don t have her email address.      Thank you,    Gato Guillen [address] [address]     [phone] Cell  [email or web address]       The truth is: mental illness affects more people than you may think, and we need to talk about it. It s Okay to Say    [email or web address]    This message may contain confidential information. If it has been sent to you in error, please reply to advise the sender of the error and then immediately delete this message and any attachments. Thank you         From: Gato Guillen <[email or web address]>  Date: Tuesday, March 21, 2017 at 8:19 AM  To: \"[email or web address]\" <[email or web address]>  Subject: Thank you thank you thank you!    Dr. Rios and Ms. Springer,    I wanted to thank both of you for meeting with me last Tuesday at Gato III s appointment and to thank both of you for taking such good care of him these past couple of months since he arrived in [place].  I couldn t be more pleased to know that Gato is in such good hands.  I ve heard such good things about your program and you guys have truly been an answer to our hopes and prayers.    Have a great week and keep up the good work!      Thanks,    Gato Guillen [address] [address]     [phone] " Cell  [facility].org       The truth is: mental illness affects more people than you may think, and we need to talk about it. It s Okay to Say    [email or web address]

## 2017-03-28 ENCOUNTER — OFFICE VISIT (OUTPATIENT)
Dept: PSYCHIATRY | Facility: CLINIC | Age: 22
End: 2017-03-28
Attending: PSYCHOLOGIST
Payer: COMMERCIAL

## 2017-03-28 DIAGNOSIS — F20.9 SCHIZOPHRENIA, UNSPECIFIED TYPE (H): Primary | ICD-10-CM

## 2017-03-28 NOTE — PROGRESS NOTES
First Episode Group       Client: Jasen Guillen III  : 1995  MRN: [id number]  Date of Service: 3/28/2017  Diagnosis: Mr. Guillen is being seen for a diagnosis of psychosis  Number of Participants: 7  Group Time: 60 minutes  Facilitators: Umu Bell, Ph. D., Grace Stearns MHAILEE    The first episode group is based on the cognitive behavioral therapy model that uses psychoeducation, cognitive restructuring, problem solving techniques, and social skills.     Diagnostic criteria for group participation: History of psychosis    Group Topic for Today: Today we talked about wellness, in particular physical wellness. The group discussed ways to improve physical wellness by doing exercise, setting SMART goals, and using rewards. Group members gained insight abut the role of physical wellness as an integral part of wellness in general.    Description: Active Participant  Mr. Guillen was at ease and engaged other patients.    Plan: Continue with group goals to minimize impact of psychotic symptoms and maintain stability    I saw the patient with the psychotherapy trainee and participated in the service.  I agree with the findings and plan as documented in this note.    Umu Bell

## 2017-03-28 NOTE — MR AVS SNAPSHOT
After Visit Summary   3/28/2017    Jasen Guillen III    MRN: [id number]           Patient Information     Date Of Birth          1995        Visit Information        Provider Department      3/28/2017 3:00 PM Umu Bell, PhD  Psychiatry Clinic        Today's Diagnoses     Schizophrenia, unspecified type (H)    -  1       Follow-ups after your visit        Your next 10 appointments already scheduled     Apr 04, 2017 10:10 AM CDT   First Episode Return with Lynnette Rios MD   Psychiatry Clinic ([facility])    [address] [facility] [id number] [phone]            Apr 13, 2017 10:30 AM CDT   Nurse Visit with [facility] Psychiatry Nurse   Psychiatry Clinic ([facility])    [address] [facility] [id number] [phone]              Who to contact     Please call your clinic at [phone] to:    Ask questions about your health    Make or cancel appointments    Discuss your medicines    Learn about your test results    Speak to your doctor   If you have compliments or concerns about an experience at your clinic, or if you wish to file a complaint, please contact [facility] Patient Relations at [phone] or email us at [email or web address]         Additional Information About Your Visit        MyChart Information     NanoSight is an electronic gateway that provides easy, online access to your medical records. With NanoSight, you can request a clinic appointment, read your test results, renew a prescription or communicate with your care team.     To sign up for Commerce Scien[employer]est visit the website at [email or web address]   You will be asked to enter the access code listed below, as well as some personal information. Please follow the directions to create your username and password.     Your access code is:  [email or web address]  Expires: 2017 11:48 AM     Your access code will  in 90 days. If you need help or a new code, please contact your [facility] or call [phone] for assistance.        Care EveryWhere ID     This is your Care EveryWhere ID. This could be used by other organizations to access your [place] medical records  [id number]         Blood Pressure from Last 3 Encounters:   17 108/70   17 109/75   17 106/74    Weight from Last 3 Encounters:   17 85.5 kg (188 lb 9.6 oz)   17 86.2 kg (190 lb)   17 87.5 kg (192 lb 12.8 oz)              Today, you had the following     No orders found for display       Primary Care Provider    None       No address on file        Thank you!     Thank you for choosing PSYCHIATRY CLINIC  for your care. Our goal is always to provide you with excellent care. Hearing back from our patients is one way we can continue to improve our services. Please take a few minutes to complete the written survey that you may receive in the mail after your visit with us. Thank you!             Your Updated Medication List - Protect others around you: Learn how to safely use, store and throw away your medicines at www.disposemymeds.org.          This list is accurate as of: 3/28/17 11:59 PM.  Always use your most recent med list.                   Brand Name Dispense Instructions for use    paliperidone 234 MG/1.5ML Susp    INVEGA SUSTENNA    1.5 mL    Inject 1.5 mLs (234 mg) into the muscle every 28 days

## 2017-04-04 ENCOUNTER — OFFICE VISIT (OUTPATIENT)
Dept: PSYCHIATRY | Facility: CLINIC | Age: 22
End: 2017-04-04
Attending: PSYCHIATRY & NEUROLOGY
Payer: COMMERCIAL

## 2017-04-04 ENCOUNTER — TELEPHONE (OUTPATIENT)
Dept: PSYCHIATRY | Facility: CLINIC | Age: 22
End: 2017-04-04

## 2017-04-04 VITALS — HEART RATE: 73 BPM | WEIGHT: 184.6 LBS | DIASTOLIC BLOOD PRESSURE: 73 MMHG | SYSTOLIC BLOOD PRESSURE: 109 MMHG

## 2017-04-04 DIAGNOSIS — F32.A DEPRESSION, UNSPECIFIED DEPRESSION TYPE: Primary | ICD-10-CM

## 2017-04-04 PROCEDURE — 99212 OFFICE O/P EST SF 10 MIN: CPT | Mod: ZF

## 2017-04-04 RX ORDER — SERTRALINE HYDROCHLORIDE 25 MG/1
TABLET, FILM COATED ORAL
Qty: 120 TABLET | Refills: 0 | Status: SHIPPED | OUTPATIENT
Start: 2017-04-04 | End: 2017-04-19

## 2017-04-04 NOTE — NURSING NOTE
Chief Complaint   Patient presents with     Recheck Medication     schizophrenia, unspecified type     Reviewed allergies, smoking status, and pharmacy preference  Administered abuse screening questions   Obtained weight, blood pressure and heart rate

## 2017-04-04 NOTE — MR AVS SNAPSHOT
After Visit Summary   2017    Jasen Guillen III    MRN: [id number]           Patient Information     Date Of Birth          1995        Visit Information        Provider Department      2017 10:10 AM Lynnette Rios MD Psychiatry Clinic        Today's Diagnoses     Depression, unspecified depression type    -  1       Follow-ups after your visit        Who to contact     Please call your clinic at [phone] to:    Ask questions about your health    Make or cancel appointments    Discuss your medicines    Learn about your test results    Speak to your doctor   If you have compliments or concerns about an experience at your clinic, or if you wish to file a complaint, please contact [facility] Patient Relations at [phone] or email us at [email or web address]         Additional Information About Your Visit        MyChart Information     Stockdrift is an electronic gateway that provides easy, online access to your medical records. With Stockdrift, you can request a clinic appointment, read your test results, renew a prescription or communicate with your care team.     To sign up for Lotour.comt visit the website at [email or web address]   You will be asked to enter the access code listed below, as well as some personal information. Please follow the directions to create your username and password.     Your access code is: [email or web address]  Expires: 2017  3:30 PM     Your access code will  in 90 days. If you need help or a new code, please contact your [facility] or call [phone] for assistance.        Care EveryWhere ID     This is your Care EveryWhere ID. This could be used by other organizations to access your [place] medical records  [id number]        Your Vitals Were     Pulse                   73            Blood Pressure from Last 3 Encounters:   17 114/74   17 109/73   17  108/70    Weight from Last 3 Encounters:   04/18/17 81.9 kg (180 lb 9.6 oz)   04/04/17 83.7 kg (184 lb 9.6 oz)   03/14/17 85.5 kg (188 lb 9.6 oz)              Today, you had the following     No orders found for display         Today's Medication Changes          These changes are accurate as of: 4/4/17 11:59 PM.  If you have any questions, ask your nurse or doctor.               Start taking these medicines.        Dose/Directions    sertraline 25 MG tablet   Commonly known as:  ZOLOFT   Used for:  Depression, unspecified depression type   Started by:  Lynnette Rios MD        Start with 25 mg po qd, then increase by 25 mg po qd until at a dose of 100 mg po qd   Quantity:  120 tablet   Refills:  0            Where to get your medicines      These medications were sent to [address] [address]     Phone:  [phone]     sertraline 25 MG tablet                Primary Care Provider    None       No address on file        Thank you!     Thank you for choosing PSYCHIATRY CLINIC  for your care. Our goal is always to provide you with excellent care. Hearing back from our patients is one way we can continue to improve our services. Please take a few minutes to complete the written survey that you may receive in the mail after your visit with us. Thank you!             Your Updated Medication List - Protect others around you: Learn how to safely use, store and throw away your medicines at www.disposemymeds.org.          This list is accurate as of: 4/4/17 11:59 PM.  Always use your most recent med list.                   Brand Name Dispense Instructions for use    paliperidone 234 MG/1.5ML Susp    INVEGA SUSTENNA    1.5 mL    Inject 1.5 mLs (234 mg) into the muscle every 28 days       sertraline 25 MG tablet    ZOLOFT    120 tablet    Start with 25 mg po qd, then increase by 25 mg po qd until at a dose of 100 mg po qd

## 2017-04-04 NOTE — LETTER
April 4, 2017      RE: Jasen Guillen III         To Whomever It May Concern    Gato is currently under my care for the treatment of Schizophrenia. Please be aware that Gato missed his exam yesterday due to side effects he was experiencing from medication necessary to treat his symptoms. Please allow him to re-schedule this exam and allow for flexibility in allotted time for future exams. We may request further accommodations as needed.        Sincerely,      Lynnette Rios MD

## 2017-04-11 ENCOUNTER — OFFICE VISIT (OUTPATIENT)
Dept: PSYCHIATRY | Facility: CLINIC | Age: 22
End: 2017-04-11
Attending: PSYCHOLOGIST
Payer: COMMERCIAL

## 2017-04-11 DIAGNOSIS — F29 PSYCHOSIS, UNSPECIFIED PSYCHOSIS TYPE (H): Primary | ICD-10-CM

## 2017-04-11 NOTE — MR AVS SNAPSHOT
After Visit Summary   4/11/2017    Jasen Guillen III    MRN: [id number]           Patient Information     Date Of Birth          1995        Visit Information        Provider Department      4/11/2017 3:00 PM Umu Bell, PhD  Psychiatry Clinic        Today's Diagnoses     Psychosis, unspecified psychosis type    -  1       Follow-ups after your visit        Your next 10 appointments already scheduled     Apr 13, 2017 10:30 AM CDT   Nurse Visit with [facility] Psychiatry Nurse   Psychiatry Clinic ([facility])    [place] [id number] [facility] [phone]            Apr 18, 2017 10:00 AM CDT   First Episode Return with Lynnette Rios MD   Psychiatry Clinic ([facility])    [address] [facility] [phone]              Who to contact     Please call your clinic at [phone] to:    Ask questions about your health    Make or cancel appointments    Discuss your medicines    Learn about your test results    Speak to your doctor   If you have compliments or concerns about an experience at your clinic, or if you wish to file a complaint, please contact [facility] Physicians Patient Relations at [phone] or email us at [email or web address]         Additional Information About Your Visit        MyChart Information     Plumbeet is an electronic gateway that provides easy, online access to your medical records. With Picklify, you can request a clinic appointment, read your test results, renew a prescription or communicate with your care team.     To sign up for Plumbeet visit the website at [email or web address]   You will be asked to enter the access code listed below, as well as some personal information. Please follow the directions to create your username and password.     Your access code is:  [email or web address]  Expires: 2017 11:48 AM     Your access code will  in 90 days. If you need help or a new code, please contact your [facility] Clinic or call [phone] for assistance.        Care EveryWhere ID     This is your Care EveryWhere ID. This could be used by other organizations to access your [place] medical records  [id number]         Blood Pressure from Last 3 Encounters:   17 109/73   17 108/70   17 109/75    Weight from Last 3 Encounters:   17 83.7 kg (184 lb 9.6 oz)   17 85.5 kg (188 lb 9.6 oz)   17 86.2 kg (190 lb)              Today, you had the following     No orders found for display       Primary Care Provider    None       No address on file        Thank you!     Thank you for choosing PSYCHIATRY CLINIC  for your care. Our goal is always to provide you with excellent care. Hearing back from our patients is one way we can continue to improve our services. Please take a few minutes to complete the written survey that you may receive in the mail after your visit with us. Thank you!             Your Updated Medication List - Protect others around you: Learn how to safely use, store and throw away your medicines at www.disposemymeds.org.          This list is accurate as of: 17 11:59 PM.  Always use your most recent med list.                   Brand Name Dispense Instructions for use    paliperidone 234 MG/1.5ML Susp    INVEGA SUSTENNA    1.5 mL    Inject 1.5 mLs (234 mg) into the muscle every 28 days       sertraline 25 MG tablet    ZOLOFT    120 tablet    Start with 25 mg po qd, then increase by 25 mg po qd until at a dose of 100 mg po qd

## 2017-04-12 ENCOUNTER — TELEPHONE (OUTPATIENT)
Dept: PSYCHIATRY | Facility: CLINIC | Age: 22
End: 2017-04-12

## 2017-04-12 NOTE — PROGRESS NOTES
First Episode Group         Client: Jasen Guillen  : 1995  MRN: [id number]  Date of Service: 2017  Diagnosis: Mr. Guillen is being seen for a diagnosis of psychosis  Number of Participants: 8  Group Time: 60 minutes  Facilitators: Howard Mcmahon Ph.D., Umu Bell, Ph. D.     The first episode group is based on the cognitive behavioral therapy model that uses psychoeducation, cognitive restructuring, problem solving techniques, and social skills.      Diagnostic criteria for group participation: History of psychosis     Group Topic for Today: Today we discussed the ways we conceptualize and talk about our psychosis symptoms/diagnosis. The group explored conceptions of psychosis in the media, stigma, and the power and risks of disclosure.     Description: Cooperative  Mr. Guillen was engaged in the group milieu.      Plan: Continue with group goals to minimize impact of psychotic symptoms and maintain stability    I was present for and actively participated in the entire group therapy session, my observations of Jasen Guillen III s progress and participation are that he actively participated.    Umu Bell

## 2017-04-13 ENCOUNTER — ALLIED HEALTH/NURSE VISIT (OUTPATIENT)
Dept: PSYCHIATRY | Facility: CLINIC | Age: 22
End: 2017-04-13
Attending: PSYCHIATRY & NEUROLOGY
Payer: COMMERCIAL

## 2017-04-13 ENCOUNTER — TELEPHONE (OUTPATIENT)
Dept: PSYCHIATRY | Facility: CLINIC | Age: 22
End: 2017-04-13

## 2017-04-13 DIAGNOSIS — F29 PSYCHOSIS, UNSPECIFIED PSYCHOSIS TYPE (H): Primary | ICD-10-CM

## 2017-04-13 DIAGNOSIS — F20.9 SCHIZOPHRENIA (H): Primary | ICD-10-CM

## 2017-04-13 DIAGNOSIS — Z79.899 ENCOUNTER FOR LONG-TERM (CURRENT) USE OF MEDICATIONS: ICD-10-CM

## 2017-04-13 PROCEDURE — 25000128 H RX IP 250 OP 636: Mod: ZF

## 2017-04-13 PROCEDURE — 96372 THER/PROPH/DIAG INJ SC/IM: CPT | Mod: ZF

## 2017-04-13 RX ORDER — BENZTROPINE MESYLATE 0.5 MG/1
0.25 TABLET ORAL 2 TIMES DAILY PRN
Qty: 30 TABLET | Refills: 0 | Status: SHIPPED | OUTPATIENT
Start: 2017-04-13 | End: 2017-04-19

## 2017-04-13 NOTE — TELEPHONE ENCOUNTER
Per lydia Schneider to submit Rx.    Attempt to call pt with update but phone went to  and  full.

## 2017-04-13 NOTE — TELEPHONE ENCOUNTER
----- Message from Ruma Pino sent at 4/13/2017  1:46 PM CDT -----  Regarding: Meds/Elizabethv  Contact: [phone]  Caller:  Pt    Medication:  Cogentin 0.5mg 1/2 tablet 2x daily, PRN    Pharmacy and location:  [facility]    Have you contacted the pharmacy: No- script has not been written by [facility] before    How much of medication do you have left:  3 days    Pending appt: Yes    Okay to leave VM:  Yes- he'd appreciate a call when it's been sent

## 2017-04-13 NOTE — TELEPHONE ENCOUNTER
Writer met with pt today for injection appt.  Pt confirms dose in msg below.  Reports to take this daily M-F and skips over the weekend as SE (oculogyric crisis) are tolerable during the weekend.  Pt states that medication is effective in prevent the occurrence of SE.  Writer stated would have to confirm this dose with provider prior to prescribing as last office note is not complete.  Will return call to pt when recommendations are rec'd.  Pt would like Rx submitted to [facility]    Sent msg to Dr. Rios

## 2017-04-13 NOTE — MR AVS SNAPSHOT
After Visit Summary   4/13/2017    Jasen Guillen III    MRN: [id number]           Patient Information     Date Of Birth          1995        Visit Information        Provider Department      4/13/2017 2:15 PM Nurse, [facility]        Today's Diagnoses     Psychosis, unspecified psychosis type    -  1    Encounter for long-term (current) use of medications           Follow-ups after your visit        Your next 10 appointments already scheduled     Apr 18, 2017 10:00 AM CDT   First Episode Return with Lynnette Rios MD   Psychiatry Clinic ([facility])    [address] [facility] [id number] [phone]            May 11, 2017  1:00 PM CDT   Nurse Visit with [facility] Psychiat[facility] Nurse   Psychiatry Clinic ([facility])    [address] [facility] [id number] [phone]              Who to contact     Please call your clinic at [phone] to:    Ask questions about your health    Make or cancel appointments    Discuss your medicines    Learn about your test results    Speak to your doctor   If you have compliments or concerns about an experience at your clinic, or if you wish to file a complaint, please contact [facility] Patient Relations at [phone] or email us at [email or web address]         Additional Information About Your Visit        MyChart Information     [employer] is an electronic gateway that provides easy, online access to your medical records. With [employer], you can request a clinic appointment, read your test results, renew a prescription or communicate with your care team.     To sign up for [employer] visit the website at [email or web address]acquired by [employer] in [email or web address]).[email or web address]   You will be asked to enter the access code listed below, as well as some personal information. Please follow the directions to create your username and  password.     Your access code is: [email or web address]  Expires: 2017 11:48 AM     Your access code will  in 90 days. If you need help or a new code, please contact your [facility] or call [phone] for assistance.        Care EveryWhere ID     This is your Care EveryWhere ID. This could be used by other organizations to access your [place] medical records  [id number]         Blood Pressure from Last 3 Encounters:   17 109/73   17 108/70   17 109/75    Weight from Last 3 Encounters:   17 83.7 kg (184 lb 9.6 oz)   17 85.5 kg (188 lb 9.6 oz)   17 86.2 kg (190 lb)              Today, you had the following     No orders found for display       Primary Care Provider    None       No address on file        Thank you!     Thank you for choosing PSYCHIATRY CLINIC  for your care. Our goal is always to provide you with excellent care. Hearing back from our patients is one way we can continue to improve our services. Please take a few minutes to complete the written survey that you may receive in the mail after your visit with us. Thank you!             Your Updated Medication List - Protect others around you: Learn how to safely use, store and throw away your medicines at www.disposemymeds.org.          This list is accurate as of: 17  3:16 PM.  Always use your most recent med list.                   Brand Name Dispense Instructions for use    paliperidone 234 MG/1.5ML Susp    INVEGA SUSTENNA    1.5 mL    Inject 1.5 mLs (234 mg) into the muscle every 28 days       sertraline 25 MG tablet    ZOLOFT    120 tablet    Start with 25 mg po qd, then increase by 25 mg po qd until at a dose of 100 mg po qd

## 2017-04-13 NOTE — NURSING NOTE
"OBSERVATIONS    Prior to administration pt identified by name and :  Yes    1.  Appearance:  Casual dress, weather appropriate.  2.  Mood:  \"good\". Pt pleasant through encounter.  3.  Affect:  Congruent to mood.  4.  Interactions:  Appropriate, pt apologetic for forgetting about injection appt scheduled earlier.  Writer inquired about recently starting sertraline.  Pt reports that this was started due to \"low mood\" and decreased motivation to complete homework.  Pt states that he is currently taking Zoloft 75 mg QD and is feeling \"really good\".  Also mentions that he has been more motivated to complete homework.  Initially denies SE, then states he's noticed a mild increase in hunger.  5.  Eye contact:  Good  6.  Side Effects:  Reports once weekly oculogyri crisis.  Requests an Rx for Cogentin (see separate encounter for this)  7.  Education:  Denies  8.  Level of Cooperation:  Full  9.  Compliance:  Full  10.  Next appointment:  17- Dr. Rios, 17- injection        "

## 2017-04-17 NOTE — PROGRESS NOTES
"  PSYCHIATRY CLINIC PROGRESS NOTE       INTERIM HISTORY                                                 PSYCH CRITICAL ITEM HISTORY  Jasen Guillen III is a 21 year old male who comes in for a scheduled visit today to evaluate how he is doing with a reduction in his oral paliperidone.    Patient stopped the paliperidone 3 mg po qd 2 weeks ago to see if this would help with sx of sedation, low energy, amotivation, and oculogyric spasms.    Patient reports that he feels much better over the past week or so-- energy and focus are improved, and he feels less \"drugged out\" during the day. No muscle or facial side effects.    Started [employer] group last week and participated very actively-- enjoyed it.        MEDICAL ROS:   Denies problems with salivation, muscle stiffness.    PSYCH and CD Critical Summary Points since July 2016           None.        Patient Active Problem List   Diagnosis     Schizophrenia, unspecified type (H)            ALLERGY                                Review of patient's allergies indicates no known allergies.  MEDICATIONS                               Current Outpatient Prescriptions   Medication Sig Dispense Refill     sertraline (ZOLOFT) 25 MG tablet Start with 25 mg po qd, then increase by 25 mg po qd until at a dose of 100 mg po qd 120 tablet 0     benztropine (COGENTIN) 0.5 MG tablet Take 0.5 tablets (0.25 mg) by mouth 2 times daily as needed 30 tablet 0     paliperidone (INVEGA SUSTENNA) 234 MG/1.5ML SUSP Inject 1.5 mLs (234 mg) into the muscle every 28 days 1.5 mL 3        VITALS   /73  Pulse 73  Wt 83.7 kg (184 lb 9.6 oz)   MENTAL STATUS EXAM                                                             Alertness: Alert, oriented.  Appearance: Neatly and casually dressed.  Behavior/Demeanor: Calm, cooperative, pleasant, with excellent eye contact.  Speech: Normal rate and rhythm.  Language: Language is cogent, descriptive of current improvement in how he is " "feeling.  Psychomotor: Normal PM speed. No slowing observed.  Mood:  \"Good.\"  Affect: full range, appropriate.  Thought Process/Associations: Linear, logical, feeling optiistic about current semester.  Thought Content: Denies delusional thoughts, ideas of reference.  Perception:  Denies hallucinations  Insight: Excellent.  Judgment: Excellent  Cognition:  Intact    LABS and DATA         PHQ9 TODAY = 2  PHQ-9 SCORE 1/24/2017 1/31/2017   Total Score 9 2         DIAGNOSIS     Schizophrenia     ASSESSMENT                                     Patient doing well, having tapered off of oral paliperidone 3 mg po qd, will be due for monthly injection of 234 mg next week.    Is experiencing better energy, better sense of motivation, no psychotic sx.     PLAN                                                                                                       1) PSYCHOTROPIC MEDICATIONS:   Injectable paliperidone 234 mg po q month.    2) THERAPY:      Will continue Young Adults group once per month.    Interested in individual psychotherapy-- will refer to Howard Mcmahon, PhD for evaluation for individual sessions.    Will work with Darrick MANRIQUE to develop Relapse Plan.      3) RTC: Feb 7 for monthly injection.      4) CRISIS NUMBERS:   Provided routinely in AVS        TREATMENT RISK STATEMENT:  The risks, benefits, alternatives and potential adverse effects have been discussed and are understood by the patient/ patient's guardian. The pt understands the risks of using street drugs or alcohol.  There are no medical contraindications, the pt agrees to treatment with the ability to do so.  The patient understands to call 911 or come to the nearest ED if life threatening or urgent symptoms present.    PSYCHIATRY CLINIC INDIVIDUAL PSYCHOTHERAPY NOTE   Length of session: 20 minutes  Date of most recent treatment plan: 01-31-17  Date next due treatment plan: 04-30-17    Subjective: \"I am feeling much more motivated today. I would " like to consider individual therapy. Classes are going well so far also.     Plan:    Continue supportive psychotherapy during monthly medication management visits.   Refer for indiviiual psychotherapy.   Continue Young Adults Group    Psychotherapy services during this visit included  myself and Jasen Guillen.  Issues addressed in therapy included: how to strike a balance between staying active and not over-committing, now that classes are underway and he is feeling more energy--  and were addressed using primarily psychodynamic, supportive and CBT techniques.        TREATMENT  PLAN          SYMPTOMS;PROBLEMS   MEASURABLE GOALS;    FUNCTIONAL IMPROVEMENT INTERVENTIONS;    GAINS MADE DISCHARGE CRITERIA   Low energy, lack of motivation Patient will experience a significant improvement in energy and motivation, and will be able to engage in outsidesocializing activities as well as classwork Supportive Psychotherapy once per month    Individual psychotherapy, once per week Ongoing engagement with social activities   Ideas of reference during times of stress Patient will no longer experience ideas of reference Monthly injectable paliperidone Two years without symptoms, followed by two years of monitoring without symptoms relapse   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       Faculty: Lynnette Rios M.D.

## 2017-04-18 ENCOUNTER — OFFICE VISIT (OUTPATIENT)
Dept: PSYCHIATRY | Facility: CLINIC | Age: 22
End: 2017-04-18
Attending: PSYCHOLOGIST
Payer: COMMERCIAL

## 2017-04-18 ENCOUNTER — OFFICE VISIT (OUTPATIENT)
Dept: PSYCHIATRY | Facility: CLINIC | Age: 22
End: 2017-04-18
Attending: PSYCHIATRY & NEUROLOGY
Payer: COMMERCIAL

## 2017-04-18 VITALS — DIASTOLIC BLOOD PRESSURE: 74 MMHG | SYSTOLIC BLOOD PRESSURE: 114 MMHG | WEIGHT: 180.6 LBS | HEART RATE: 94 BPM

## 2017-04-18 DIAGNOSIS — F29 PSYCHOSIS, UNSPECIFIED PSYCHOSIS TYPE (H): Primary | ICD-10-CM

## 2017-04-18 DIAGNOSIS — F20.9 SCHIZOPHRENIA, UNSPECIFIED TYPE (H): Primary | ICD-10-CM

## 2017-04-18 PROCEDURE — 99212 OFFICE O/P EST SF 10 MIN: CPT | Mod: ZF

## 2017-04-18 NOTE — MR AVS SNAPSHOT
After Visit Summary   2017    Jasen Guillen III    MRN: [id number]           Patient Information     Date Of Birth          1995        Visit Information        Provider Department      2017 3:00 PM Umu Bell, PhD  Psychiatry Clinic        Today's Diagnoses     Psychosis, unspecified psychosis type    -  1       Follow-ups after your visit        Your next 10 appointments already scheduled     May 11, 2017  1:00 PM CDT   Nurse Visit with [facility] Psychiatry Nurse   Psychiatry Clinic ([facility])    [address]  [address] [facility] [phone]              Who to contact     Please call your clinic at [phone] to:    Ask questions about your health    Make or cancel appointments    Discuss your medicines    Learn about your test results    Speak to your doctor   If you have compliments or concerns about an experience at your clinic, or if you wish to file a complaint, please contact [facility] Patient Relations at [phone] or email us at [email or web address]         Additional Information About Your Visit        MyChart Information     [employer] is an electronic gateway that provides easy, online access to your medical records. With [employer], you can request a clinic appointment, read your test results, renew a prescription or communicate with your care team.     To sign up for [employer] visit the website at [email or web address]   You will be asked to enter the access code listed below, as well as some personal information. Please follow the directions to create your username and password.     Your access code is: [email or web address]  Expires: 2017 11:48 AM     Your access code will  in 90 days. If you need help or a new code, please contact your [facility] or call [phone] for assistance.        Care EveryWhere ID      This is your Care EveryWhere ID. This could be used by other organizations to access your [place] medical records  [id number]         Blood Pressure from Last 3 Encounters:   04/18/17 114/74   04/04/17 109/73   03/14/17 108/70    Weight from Last 3 Encounters:   04/18/17 81.9 kg (180 lb 9.6 oz)   04/04/17 83.7 kg (184 lb 9.6 oz)   03/14/17 85.5 kg (188 lb 9.6 oz)              Today, you had the following     No orders found for display       Primary Care Provider    None       No address on file        Thank you!     Thank you for choosing PSYCHIATRY CLINIC  for your care. Our goal is always to provide you with excellent care. Hearing back from our patients is one way we can continue to improve our services. Please take a few minutes to complete the written survey that you may receive in the mail after your visit with us. Thank you!             Your Updated Medication List - Protect others around you: Learn how to safely use, store and throw away your medicines at www.disposemymeds.org.          This list is accurate as of: 4/18/17  7:43 PM.  Always use your most recent med list.                   Brand Name Dispense Instructions for use    benztropine 0.5 MG tablet    COGENTIN    30 tablet    Take 0.5 tablets (0.25 mg) by mouth 2 times daily as needed       paliperidone 234 MG/1.5ML Susp    INVEGA SUSTENNA    1.5 mL    Inject 1.5 mLs (234 mg) into the muscle every 28 days       sertraline 25 MG tablet    ZOLOFT    120 tablet    Start with 25 mg po qd, then increase by 25 mg po qd until at a dose of 100 mg po qd

## 2017-04-18 NOTE — MR AVS SNAPSHOT
After Visit Summary   2017    Jasen Guillen III    MRN: [id number]           Patient Information     Date Of Birth          1995        Visit Information        Provider Department      2017 10:00 AM Lynnette Rios MD Psychiatry Clinic        Today's Diagnoses     Schizophrenia, unspecified type (H)    -  1       Follow-ups after your visit        Who to contact     Please call your clinic at [phone] to:    Ask questions about your health    Make or cancel appointments    Discuss your medicines    Learn about your test results    Speak to your doctor   If you have compliments or concerns about an experience at your clinic, or if you wish to file a complaint, please contact [facility] Patient Relations at [phone] or email us at [email or web address]         Additional Information About Your Visit        MyChart Information     [employer] is an electronic gateway that provides easy, online access to your medical records. With [employer], you can request a clinic appointment, read your test results, renew a prescription or communicate with your care team.     To sign up for [employer] visit the website at [email or web address]   You will be asked to enter the access code listed below, as well as some personal information. Please follow the directions to create your username and password.     Your access code is: [email or web address]  Expires: 2017  3:30 PM     Your access code will  in 90 days. If you need help or a new code, please contact your [facility] or call [phone] for assistance.        Care EveryWhere ID     This is your Care EveryWhere ID. This could be used by other organizations to access your [place] medical records  [id number]        Your Vitals Were     Pulse                   94            Blood Pressure from Last 3 Encounters:   17 114/74   17 109/73   17 108/70     Weight from Last 3 Encounters:   04/18/17 81.9 kg (180 lb 9.6 oz)   04/04/17 83.7 kg (184 lb 9.6 oz)   03/14/17 85.5 kg (188 lb 9.6 oz)              Today, you had the following     No orders found for display       Primary Care Provider    None       No address on file        Thank you!     Thank you for choosing PSYCHIATRY CLINIC  for your care. Our goal is always to provide you with excellent care. Hearing back from our patients is one way we can continue to improve our services. Please take a few minutes to complete the written survey that you may receive in the mail after your visit with us. Thank you!             Your Updated Medication List - Protect others around you: Learn how to safely use, store and throw away your medicines at www.disposemymeds.org.          This list is accurate as of: 4/18/17 11:59 PM.  Always use your most recent med list.                   Brand Name Dispense Instructions for use    paliperidone 234 MG/1.5ML Susp    INVEGA SUSTENNA    1.5 mL    Inject 1.5 mLs (234 mg) into the muscle every 28 days

## 2017-04-18 NOTE — PROGRESS NOTES
"  PSYCHIATRY CLINIC PROGRESS NOTE       INTERIM HISTORY                                                 PSYCH CRITICAL ITEM HISTORY  Jasen Guillen III is a 21 year old male with a diagnosis of schizophrenia (for past ~ 2 years) who comes in for a scheduled visit today to evaluate how he continues to do, given his recent discontinuation of oral paliperidone.    Patient stopped the paliperidone 3 mg po qd 6 weeks ago to see if this would help with sx of sedation, low energy, amotivation, and oculogyric spasms. He continues to receive paliperidone injections q month at 234 mg. He also takes occasional low-dose Cogentin for muscle spasms (oculogyric).    Patient reports that in the past week he has noticed the acute onset of depression symptoms;  Feeling low and hopeless, unable to feel motivated to get out of bed and do his school work. He reports that these feelings are similar to prior episodes, which usually resolved spontaneously within a few days, but which have on at least one occasion in the past been associated with suicidal ideation. He has no SI today, but is very troubled by the depressed mood and requests medications \"to help make it go away so I can concentrate on my school work and not fall behind.\"    Has been participating actively in [employer] Adults group.      MEDICAL ROS:   Denies problems with salivation, muscle stiffness, motor restlessness. Reports occasional \"eye spasms\" \"they roll back in my head\" but at a lesser frequency than previously. Denies other abnormal motor movements.    PSYCH and CD Critical Summary Points since July 2016           None.        Patient Active Problem List   Diagnosis     Schizophrenia, unspecified type (H)            ALLERGY                                Review of patient's allergies indicates no known allergies.  MEDICATIONS                               Paliperidone 234 IM q 28 days  Cogentin 0.25 mg po up to bid prn     VITALS   /70  Pulse 65  Wt 85.5 kg " "(188 lb 9.6 oz)   MENTAL STATUS EXAM                                                             Motor:  Walks smoothly in squires, normal arm swing, normal gait.  No UE stifness.  Mild fleeting dyskinetic movements observable in face and hands.  Alertness: Alert, oriented.  Appearance: Neatly and casually dressed.  Behavior/Demeanor: Calm, cooperative, pleasant, with excellent eye contact.  Speech: Normal rate and rhythm.  Language: Language is cogent, descriptive of his depressive sx and the distress they are causing him.  Psychomotor: Normal PM speed. No slowing observed.  Has mildly masked facies.  Mood: \"really down.\"  Affect:Constricted, appears anxious and distressed.  Thought Process/Associations: Linear, logical.  Thought Content: Denies delusional thoughts, ideas of reference, SI. HI.  Very preoccupied and concerned about the onset of depression symptoms.  Perception:  Denies hallucinations  Insight: Excellent.  Judgment: Excellent  Cognition:  Intact    LABS and DATA         PHQ9 TODAY = 2  PHQ-9 SCORE 1/24/2017 1/31/2017   Total Score 9 2         DIAGNOSIS     Schizophrenia     ASSESSMENT                                     Schizophrenia, under good control, with some very mild dyskinesia observed-- likely due to underlying illness, but may be partially due to lowering of paliperidone dose with discontinuation of oral meds.    New onset of depressive symptoms, do not appear to be in response to a psychosocial stressor but rather to have a strong endogenous quality.     PLAN                                                                                                       1) PSYCHOTROPIC MEDICATIONS:   Continue iInjectable paliperidone 234 mg po q month, with COgentin 0.25 mg po up to bid prn.     Add sertraline 25 mg po qd, patient to slowly increase dose to achieve 100 mg po qd.     Patient to call if no sign of  relief from depression sx within the first 10 days of initiating sertarline.    2) THERAPY: " "     Will continue Young Adults group once per month.    Interested in individual psychotherapy-- will refer to Howard Mcmahon, PhD for evaluation for individual sessions.    Will work with Darrick MANRIQUE to develop Relapse Plan.      3) RTC: April 7 for injection.    4) CRISIS NUMBERS:   Provided routinely in AVS        TREATMENT RISK STATEMENT:  The risks, benefits, alternatives and potential adverse effects have been discussed and are understood by the patient/ patient's guardian. The pt understands the risks of using street drugs or alcohol.  There are no medical contraindications, the pt agrees to treatment with the ability to do so.  The patient understands to call 911 or come to the nearest ED if life threatening or urgent symptoms present.    PSYCHIATRY CLINIC INDIVIDUAL PSYCHOTHERAPY NOTE   Length of session: 20 minutes  Date of most recent treatment plan: 01-31-17  Date next due treatment plan: 04-30-17    Subjective: \"I amfeeling very depressed. I am worried I won't be able to do my class work.\"     Plan:    Continue supportive psychotherapy during monthly medication management visits.   Continue Young Adults Group    Psychotherapy services during this visit included  myself and Jasen Guillen.  Issues addressed in therapy included: how to strike a balance between staying active and not over-committing, now that classes are underway and he is feeling more energy--  and were addressed using primarily psychodynamic, supportive and CBT techniques.        TREATMENT  PLAN          SYMPTOMS;PROBLEMS   MEASURABLE GOALS;    FUNCTIONAL IMPROVEMENT INTERVENTIONS;    GAINS MADE DISCHARGE CRITERIA   Low energy, lack of motivation Patient will experience a significant improvement in energy and motivation, and will be able to engage in outsidesocializing activities as well as classwork Supportive Psychotherapy once per month    Individual psychotherapy, once per week Ongoing engagement with social activities   Ideas of " reference during times of stress Patient will no longer experience ideas of reference Monthly injectable paliperidone Two years without symptoms, followed by two years of monitoring without symptoms relapse   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       Faculty: Lynnette Rios M.D.

## 2017-04-18 NOTE — PROGRESS NOTES
First Episode Group       Client: Jasen Guillen III  : 1995  MRN: [id number]  Date of Service: 2017  Diagnosis: Mr. Wilmer MAURICIO is being seen for a diagnosis of psychosis  Number of Participants: 7  Group Time: 60 minutes  Facilitators: Umu Bell, Ph. D., Howard Brizuela, Ph.D., Grace Stearns M.AMartin    The first episode group is based on the cognitive behavioral therapy model that uses psychoeducation, cognitive restructuring, problem solving techniques, and social skills.     Diagnostic criteria for group participation: History of psychosis    Group Topic for Today: Today we talked about sleep. Patients shared their sleep problems including hypersominia, insomnia, irregular sleep schedule, and nightmares. The group problem solved going to bed and waking up on time, as well as reduce time of sleep. Psychoeducation was provided about medication side effects, circadian rhythm, and sleep cycles.    Description: Active Participant  Mr. Guillen was at ease and engaged other patients.    Plan: Continue with group goals to minimize impact of psychotic symptoms and maintain stability    I was present for and actively participated in the entire group therapy session, my observations of Jasen Guillen III s progress and participation are that he actively participated and shared his history of sleep difficulties.    Umu Bell

## 2017-04-19 ENCOUNTER — TELEPHONE (OUTPATIENT)
Dept: PSYCHIATRY | Facility: CLINIC | Age: 22
End: 2017-04-19

## 2017-04-19 DIAGNOSIS — F29 PSYCHOSIS, UNSPECIFIED PSYCHOSIS TYPE (H): Primary | ICD-10-CM

## 2017-04-19 DIAGNOSIS — F32.A DEPRESSION, UNSPECIFIED DEPRESSION TYPE: ICD-10-CM

## 2017-04-19 RX ORDER — BENZTROPINE MESYLATE 0.5 MG/1
0.25 TABLET ORAL 2 TIMES DAILY PRN
Qty: 30 TABLET | Refills: 1 | Status: SHIPPED | OUTPATIENT
Start: 2017-04-19 | End: 2017-11-22

## 2017-04-19 RX ORDER — SERTRALINE HYDROCHLORIDE 100 MG/1
100 TABLET, FILM COATED ORAL DAILY
Qty: 30 TABLET | Refills: 1 | Status: SHIPPED | OUTPATIENT
Start: 2017-04-19 | End: 2017-10-26

## 2017-04-19 RX ORDER — PROPRANOLOL HYDROCHLORIDE 10 MG/1
10 TABLET ORAL 4 TIMES DAILY PRN
Qty: 120 TABLET | Refills: 1 | Status: SHIPPED | OUTPATIENT
Start: 2017-04-19 | End: 2018-02-12

## 2017-04-19 NOTE — TELEPHONE ENCOUNTER
From: Renetta Springer LGSW      Sent: 4/18/2017   2:03 PM        To: Rosalie Dwyer RN   Subject: Mediation fills                                   Per Dr. Rios-   Order Propranolol 10mg po qid prn for restlessness   Refills on Cogentin and Sertraline     Last appt: 4/18/17    Writer attempted to reach Dr. Rios to confirm propranolol orders, however provider is unavailable.  Spoke with Dr. Schneider who authorizes Rx for propranolol and recommends reaching out to pt to check if he has possible medical dx of asthma or other airway restrictive condition.  Will also review possible SE associated with propranolol and when to contact clinic.    Attempted to reach pt however VM is full.  Will call again later.

## 2017-04-19 NOTE — TELEPHONE ENCOUNTER
Rec'd response from Dr. Rios authorizing prescriptions below.  Current dose of Zoloft is confirmed at 100 mg QD.

## 2017-04-20 ENCOUNTER — TELEPHONE (OUTPATIENT)
Dept: PSYCHIATRY | Facility: CLINIC | Age: 22
End: 2017-04-20

## 2017-04-20 ENCOUNTER — ALLIED HEALTH/NURSE VISIT (OUTPATIENT)
Dept: PSYCHIATRY | Facility: CLINIC | Age: 22
End: 2017-04-20

## 2017-04-20 DIAGNOSIS — Z71.89 COUNSELING AND COORDINATION OF CARE: Primary | ICD-10-CM

## 2017-04-20 NOTE — TELEPHONE ENCOUNTER
From: Gato Guillen <[email or web address]>  Sent: Wednesday, April 19, 2017 5:06 PM  To: Renetta Springer  Subject: Re: Psychiatry for Gato this summer     Renetta,    It d be extremely helpful if you have some resources in [place] that he could check out.    Thanks very much.     Gato Guillen [place] [id number]   [phone] Cell  [email or web address]     The truth is: mental illness affects more people than you may think, and we need to talk about it. It s Okay to Say    [email or web address]  This message may contain confidential information. If it has been sent to you in error, please reply to advise the sender of the error and then immediately delete this message and any attachments. Thank you         From: Renetta Springer <[email or web address]>  Date: Wednesday, April 19, 2017 at 9:42 AM  To: Gato Guillen <[email or web address]>  Subject: Re: Psychiatry for Gato this summer    I think it'd be good to have some resources prepared and explore the intake/waiting lists associated in the event that Gato wants that additional support.     Any thoughts on psychiatry? or would you like me to pull together some resources?    Renetta Springer, HELIO, SW  First Episode Psychosis Program   [facility]  [facility], 2nd floor  [address], [address]  [place] [address]  Direct Phone: [phone]  Fax: [phone]        From: Gato Guillen <[email or web address]>  Sent: Wednesday, April 19, 2017 1:40:03 AM  To: Renetta Springer  Subject: Re: Psychiatry for Gato this summer    Renetta,    That s a great question.     Do you guys think it s important for Gato to get plugged into individual and group therapy while he s in [place] for the summer, or do you think that seeing a local psychiatrist and getting his medication is  sufficient?  (I ll ask Bill his preference as well, but I wanted to get your thoughts on it.)    Thanks very much!      Gato Guillen [facility] Policy [address] [address]   [phone] Cell  [email or web address]     The truth is: mental illness affects more people than you may think, and we need to talk about it. It s Okay to Say    [email or web address]  This message may contain confidential information. If it has been sent to you in error, please reply to advise the sender of the error and then immediately delete this message and any attachments. Thank you         From: Renetta Springer <[email or web address]>  Date: Tuesday, April 18, 2017 at 10:47 AM  To: Gato Guillen <[email or web address]>  Subject: Psychiatry for Gato this summer    Hi [facility]-     We saw your son today in clinic.  We will see him one more time in May before he leaves back to [place] for the summer.   We wanted to check with you about a follow-up provider in [place] while he is home for the summer to provide his monthly injection for June, July, and August.  Do you have a person identified, or would you like assistance identifying a provider for this?  Dr. Rios is fairly confident that he will have stability through the summer and will not need adjustments to his current medication doses.   We would be happy to coordinate with whichever provider you identify for this interim care.     Thank you so much!    HELIO Lombardi, RANDALL  First Episode Psychosis Program   [facility]  [facility], 2nd floor  [address], [address]  [place] [address]  Direct Phone: [phone]  Fax: [phone]

## 2017-04-20 NOTE — PROGRESS NOTES
First Episode Psychosis Program  Social Work Consultation  [facility]      Patient Name:  Jasen Guillen III  /Age:  1995 (21 year old)    Presenting SW Need(s):  Patient is needing psychiatry recommendations in [place], [place] to manage his monthly antipsychotic injection during , July, and August.       Intervention:  Participated in the patient's psychiatry appt with Dr. Rios.    Explored if Jasen has ideas for options for psychiatry in [place], [place] such as a previous provider, or if he will need support identifying a provider.   Recommended that we connect with his dad via email to see if he has identified a provider already or wants resource recommendations.      SW Assessment:  Patient seems he would benefit from assistance identifying a psychiatric provider and group support in [place], [place].  He seemed receptive to resources and SW assistance to coordinate with his father via email.    SW Plan:  Will contact patient's dad via email to coordinate resources for psaychiatry and therapeutic group support.  Will reach out to psychosis network of professionals for recommended providers in [place], [place] area.        Will route to patient's psychiatric provider(s) as an FYI.  Please call or page with any questions or concerns.    Renetta Springer, HELIO, [facility]  [phone]    Pager: [phone]    I saw the patient and carried out a mental status examination and developed the plan of care. I reviewed key portions of the history with Ms. Springer.  I agree with the findings and plan as documented in this note.    Lynnette Rios

## 2017-04-20 NOTE — MR AVS SNAPSHOT
After Visit Summary   2017    Jasen Guillen III    MRN: [id number]           Patient Information     Date Of Birth          1995        Visit Information        Provider Department      2017 1:01 PM Renetta Springer LGSW Psychiatry Clinic        Today's Diagnoses     Counseling and coordination of care    -  1       Follow-ups after your visit        Your next 10 appointments already scheduled     May 11, 2017  1:00 PM CDT   Nurse Visit with [facility] Psychiatry Nurse   Psychiatry Clinic ([facility])    [address]  [address] [facility] [id number] [phone]              Who to contact     Please call your clinic at [phone] to:    Ask questions about your health    Make or cancel appointments    Discuss your medicines    Learn about your test results    Speak to your doctor   If you have compliments or concerns about an experience at your clinic, or if you wish to file a complaint, please contact [facility] Patient Relations at [phone] or email us at [email or web address]         Additional Information About Your Visit        MyChart Information     Qwilr is an electronic gateway that provides easy, online access to your medical records. With Qwilr, you can request a clinic appointment, read your test results, renew a prescription or communicate with your care team.     To sign up for Qwilr visit the website at [email or web address]   You will be asked to enter the access code listed below, as well as some personal information. Please follow the directions to create your username and password.     Your access code is: [email or web address]  Expires: 2017 11:48 AM     Your access code will  in 90 days. If you need help or a new code, please contact your [facility] or call [phone] for assistance.        Care EveryWhere ID     This is your Care  EveryWhere ID. This could be used by other organizations to access your [place] medical records  [id number]         Blood Pressure from Last 3 Encounters:   04/18/17 114/74   04/04/17 109/73   03/14/17 108/70    Weight from Last 3 Encounters:   04/18/17 81.9 kg (180 lb 9.6 oz)   04/04/17 83.7 kg (184 lb 9.6 oz)   03/14/17 85.5 kg (188 lb 9.6 oz)              Today, you had the following     No orders found for display       Primary Care Provider    None       No address on file        Thank you!     Thank you for choosing PSYCHIATRY CLINIC  for your care. Our goal is always to provide you with excellent care. Hearing back from our patients is one way we can continue to improve our services. Please take a few minutes to complete the written survey that you may receive in the mail after your visit with us. Thank you!             Your Updated Medication List - Protect others around you: Learn how to safely use, store and throw away your medicines at www.disposemymeds.org.          This list is accurate as of: 4/20/17  1:12 PM.  Always use your most recent med list.                   Brand Name Dispense Instructions for use    benztropine 0.5 MG tablet    COGENTIN    30 tablet    Take 0.5 tablets (0.25 mg) by mouth 2 times daily as needed       paliperidone 234 MG/1.5ML Susp    INVEGA SUSTENNA    1.5 mL    Inject 1.5 mLs (234 mg) into the muscle every 28 days       propranolol 10 MG tablet    INDERAL    120 tablet    Take 1 tablet (10 mg) by mouth 4 times daily as needed for restlessness       sertraline 100 MG tablet    ZOLOFT    30 tablet    Take 1 tablet (100 mg) by mouth daily

## 2017-04-25 ENCOUNTER — OFFICE VISIT (OUTPATIENT)
Dept: PSYCHIATRY | Facility: CLINIC | Age: 22
End: 2017-04-25
Attending: PSYCHOLOGIST
Payer: COMMERCIAL

## 2017-04-25 DIAGNOSIS — F29 PSYCHOSIS, UNSPECIFIED PSYCHOSIS TYPE (H): Primary | ICD-10-CM

## 2017-04-25 NOTE — MR AVS SNAPSHOT
After Visit Summary   2017    Jasen Guillen III    MRN: [id number]           Patient Information     Date Of Birth          1995        Visit Information        Provider Department      2017 3:00 PM Linette Belcher, PhD Psychiatry Clinic        Today's Diagnoses     Psychosis, unspecified psychosis type    -  1       Follow-ups after your visit        Your next 10 appointments already scheduled     May 11, 2017  1:00 PM CDT   Nurse Visit with [facility] Psychiatry Nurse   Psychiatry Clinic ([facility])    [address]  [address] [facility] [address] [phone]              Who to contact     Please call your clinic at [phone] to:    Ask questions about your health    Make or cancel appointments    Discuss your medicines    Learn about your test results    Speak to your doctor   If you have compliments or concerns about an experience at your clinic, or if you wish to file a complaint, please contact [facility] Patient Relations at [phone] or email us at [email or web address]         Additional Information About Your Visit        MyChart Information     Drippler is an electronic gateway that provides easy, online access to your medical records. With Drippler, you can request a clinic appointment, read your test results, renew a prescription or communicate with your care team.     To sign up for Drippler visit the website at [email or web address]   You will be asked to enter the access code listed below, as well as some personal information. Please follow the directions to create your username and password.     Your access code is: [email or web address]  Expires: 2017 11:48 AM     Your access code will  in 90 days. If you need help or a new code, please contact your [facility] or call [phone] for assistance.        Care EveryWhere ID     This is  your Care EveryWhere ID. This could be used by other organizations to access your [place] medical records  [id number]         Blood Pressure from Last 3 Encounters:   04/18/17 114/74   04/04/17 109/73   03/14/17 108/70    Weight from Last 3 Encounters:   04/18/17 81.9 kg (180 lb 9.6 oz)   04/04/17 83.7 kg (184 lb 9.6 oz)   03/14/17 85.5 kg (188 lb 9.6 oz)              Today, you had the following     No orders found for display       Primary Care Provider    None       No address on file        Thank you!     Thank you for choosing PSYCHIATRY CLINIC  for your care. Our goal is always to provide you with excellent care. Hearing back from our patients is one way we can continue to improve our services. Please take a few minutes to complete the written survey that you may receive in the mail after your visit with us. Thank you!             Your Updated Medication List - Protect others around you: Learn how to safely use, store and throw away your medicines at www.disposemymeds.org.          This list is accurate as of: 4/25/17 11:59 PM.  Always use your most recent med list.                   Brand Name Dispense Instructions for use    benztropine 0.5 MG tablet    COGENTIN    30 tablet    Take 0.5 tablets (0.25 mg) by mouth 2 times daily as needed       paliperidone 234 MG/1.5ML Susp    INVEGA SUSTENNA    1.5 mL    Inject 1.5 mLs (234 mg) into the muscle every 28 days       propranolol 10 MG tablet    INDERAL    120 tablet    Take 1 tablet (10 mg) by mouth 4 times daily as needed for restlessness       sertraline 100 MG tablet    ZOLOFT    30 tablet    Take 1 tablet (100 mg) by mouth daily

## 2017-04-25 NOTE — PROGRESS NOTES
First Episode Group       Client: Jasen Guillen III  : 1995  MRN: [id number]  Date of Service: 2017  Diagnosis: Mr. Wilmer MAURICIO is being seen for a diagnosis of psychosis  Number of Participants: 8  Group Time: 60 minutes  Facilitators: Linette Belcher, Ph. D., Howard Brizuela, Ph.D., Grace Stearns MMartinAMartin    The first episode group is based on the cognitive behavioral therapy model that uses psychoeducation, cognitive restructuring, problem solving techniques, and social skills.     Diagnostic criteria for group participation: History of psychosis    Group Topic for Today: Today we had an art-and-crafts group. Group members made collages around the theme  where is my mind?  and discussed the experience of art in relation to psychosis. Patients shared their artworks at the end of the group.    Description: Active Participant  Mr. Guillen was at ease. He engaged in the activity and discussion.    Plan: Continue with group goals to minimize impact of psychotic symptoms and maintain stability    I was present for and actively participated in the entire group therapy session, my observations of Jasen Guillen III s progress and participation are that he actively engaged in the collage activity and shared his experiences with other group members appropriately.    Linette Belcher

## 2017-05-02 ENCOUNTER — OFFICE VISIT (OUTPATIENT)
Dept: PSYCHIATRY | Facility: CLINIC | Age: 22
End: 2017-05-02
Attending: PSYCHOLOGIST
Payer: COMMERCIAL

## 2017-05-02 DIAGNOSIS — F29 PSYCHOSIS, UNSPECIFIED PSYCHOSIS TYPE (H): Primary | ICD-10-CM

## 2017-05-02 NOTE — MR AVS SNAPSHOT
After Visit Summary   2017    Jasen Guillen III    MRN: [id number]           Patient Information     Date Of Birth          1995        Visit Information        Provider Department      2017 3:00 PM Umu Bell, PhD  Psychiatry Clinic        Today's Diagnoses     Psychosis, unspecified psychosis type    -  1       Follow-ups after your visit        Your next 10 appointments already scheduled     May 11, 2017  1:00 PM CDT   Nurse Visit with [facility] Psychiatry Nurse   Psychiatry Clinic ([facility])    [address]  [address] [facility] [id number] [phone]              Who to contact     Please call your clinic at [phone] to:    Ask questions about your health    Make or cancel appointments    Discuss your medicines    Learn about your test results    Speak to your doctor   If you have compliments or concerns about an experience at your clinic, or if you wish to file a complaint, please contact [facility] Patient Relations at [phone] or email us at [email or web address]         Additional Information About Your Visit        MyChart Information     [employer] is an electronic gateway that provides easy, online access to your medical records. With [employer], you can request a clinic appointment, read your test results, renew a prescription or communicate with your care team.     To sign up for [employer] visit the website at [email or web address]   You will be asked to enter the access code listed below, as well as some personal information. Please follow the directions to create your username and password.     Your access code is: [email or web address]  Expires: 2017  3:30 PM     Your access code will  in 90 days. If you need help or a new code, please contact your [facility] or call [phone] for assistance.        Care EveryWhere ID     This  is your Care EveryWhere ID. This could be used by other organizations to access your [place] medical records  [id number]         Blood Pressure from Last 3 Encounters:   04/18/17 114/74   04/04/17 109/73   03/14/17 108/70    Weight from Last 3 Encounters:   04/18/17 81.9 kg (180 lb 9.6 oz)   04/04/17 83.7 kg (184 lb 9.6 oz)   03/14/17 85.5 kg (188 lb 9.6 oz)              Today, you had the following     No orders found for display       Primary Care Provider    None       No address on file        Thank you!     Thank you for choosing PSYCHIATRY CLINIC  for your care. Our goal is always to provide you with excellent care. Hearing back from our patients is one way we can continue to improve our services. Please take a few minutes to complete the written survey that you may receive in the mail after your visit with us. Thank you!             Your Updated Medication List - Protect others around you: Learn how to safely use, store and throw away your medicines at www.disposemymeds.org.          This list is accurate as of: 5/2/17 11:59 PM.  Always use your most recent med list.                   Brand Name Dispense Instructions for use    benztropine 0.5 MG tablet    COGENTIN    30 tablet    Take 0.5 tablets (0.25 mg) by mouth 2 times daily as needed       paliperidone 234 MG/1.5ML Susp    INVEGA SUSTENNA    1.5 mL    Inject 1.5 mLs (234 mg) into the muscle every 28 days       propranolol 10 MG tablet    INDERAL    120 tablet    Take 1 tablet (10 mg) by mouth 4 times daily as needed for restlessness       sertraline 100 MG tablet    ZOLOFT    30 tablet    Take 1 tablet (100 mg) by mouth daily

## 2017-05-03 ENCOUNTER — TELEPHONE (OUTPATIENT)
Dept: PSYCHIATRY | Facility: CLINIC | Age: 22
End: 2017-05-03

## 2017-05-04 NOTE — PROGRESS NOTES
First Episode Group       Client: Jasen Guillen III  : 1995  MRN: [id number]  Date of Service: 2017  Diagnosis: Mr. Guillen is being seen for a diagnosis of psychosis  Number of Participants: 6  Group Time: 60 minutes  Facilitators: Umu Bell, Ph. D., Howard Brizuela, Ph.D., Grace Stearns M.AMartin    The first episode group is based on the cognitive behavioral therapy model that uses psychoeducation, cognitive restructuring, problem solving techniques, and social skills.     Diagnostic criteria for group participation: History of psychosis    Group Topic for Today: Today we talked about relationships. Patients shared their thoughts about relationships with parents, friends, and romantic partners. Topics included dependence/independence, vulnerability, as well as concerns for romantic relationships in the context of psychosis.    Description: Active Participant  Mr. Guillen was at ease and engaged other patients.    Plan: Continue with group goals to minimize impact of psychotic symptoms and maintain stability    I was present for and actively participated in the entire group therapy session, my observations of Jasen Guillen III s progress and participation are he actively participated in the discussion.    Umu Bell

## 2017-05-09 ENCOUNTER — OFFICE VISIT (OUTPATIENT)
Dept: PSYCHIATRY | Facility: CLINIC | Age: 22
End: 2017-05-09
Attending: PSYCHOLOGIST
Payer: COMMERCIAL

## 2017-05-09 DIAGNOSIS — F29 PSYCHOSIS, UNSPECIFIED PSYCHOSIS TYPE (H): Primary | ICD-10-CM

## 2017-05-09 NOTE — MR AVS SNAPSHOT
After Visit Summary   2017    Jasen Guillen III    MRN: [id number]           Patient Information     Date Of Birth          1995        Visit Information        Provider Department      2017 3:00 PM Sera Landrum, PhD Psychiatry Clinic        Today's Diagnoses     Psychosis, unspecified psychosis type    -  1       Follow-ups after your visit        Who to contact     Please call your clinic at [phone] to:    Ask questions about your health    Make or cancel appointments    Discuss your medicines    Learn about your test results    Speak to your doctor   If you have compliments or concerns about an experience at your clinic, or if you wish to file a complaint, please contact [facility] Patient Relations at [phone] or email us at [email or web address]         Additional Information About Your Visit        MyChart Information     Zura! is an electronic gateway that provides easy, online access to your medical records. With Zura!, you can request a clinic appointment, read your test results, renew a prescription or communicate with your care team.     To sign up for [email or web address] visit the website at [email or web address]   You will be asked to enter the access code listed below, as well as some personal information. Please follow the directions to create your username and password.     Your access code is: [email or web address]  Expires: 2017  3:30 PM     Your access code will  in 90 days. If you need help or a new code, please contact your [facility] or call [phone] for assistance.        Care EveryWhere ID     This is your Care EveryWhere ID. This could be used by other organizations to access your [place] medical records  [id number]         Blood Pressure from Last 3 Encounters:   17 114/74   17 109/73   17 108/70    Weight from Last 3 Encounters:   17 81.9 kg (180  lb 9.6 oz)   04/04/17 83.7 kg (184 lb 9.6 oz)   03/14/17 85.5 kg (188 lb 9.6 oz)              Today, you had the following     No orders found for display       Primary Care Provider    None       No address on file        Thank you!     Thank you for choosing PSYCHIATRY CLINIC  for your care. Our goal is always to provide you with excellent care. Hearing back from our patients is one way we can continue to improve our services. Please take a few minutes to complete the written survey that you may receive in the mail after your visit with us. Thank you!             Your Updated Medication List - Protect others around you: Learn how to safely use, store and throw away your medicines at www.disposemymeds.org.          This list is accurate as of: 5/9/17 11:59 PM.  Always use your most recent med list.                   Brand Name Dispense Instructions for use    benztropine 0.5 MG tablet    COGENTIN    30 tablet    Take 0.5 tablets (0.25 mg) by mouth 2 times daily as needed       paliperidone 234 MG/1.5ML Susp    INVEGA SUSTENNA    1.5 mL    Inject 1.5 mLs (234 mg) into the muscle every 28 days       propranolol 10 MG tablet    INDERAL    120 tablet    Take 1 tablet (10 mg) by mouth 4 times daily as needed for restlessness       sertraline 100 MG tablet    ZOLOFT    30 tablet    Take 1 tablet (100 mg) by mouth daily

## 2017-05-11 ENCOUNTER — CARE COORDINATION (OUTPATIENT)
Dept: PSYCHIATRY | Facility: CLINIC | Age: 22
End: 2017-05-11

## 2017-05-11 NOTE — PROGRESS NOTES
-Last injection 4/13/17 for Invega Sustenna 234 mg IM  -Last appt w/ Dr. Rios 4/18/17  -Pt scheduled for injection appt this afternoon.  -As last visit note with provider is pending will seek orders from covering provider (Dr. Aguillon)

## 2017-05-11 NOTE — PROGRESS NOTES
Message  Received: Today       Becky Aguillon MD Blake, Katherine J, RN       Caller: Unspecified (Today,  8:29 AM)                     Yes, Please administer Paliperidone Sustenna 234mg IM.  Thank you.  Kai

## 2017-05-12 ENCOUNTER — ALLIED HEALTH/NURSE VISIT (OUTPATIENT)
Dept: PSYCHIATRY | Facility: CLINIC | Age: 22
End: 2017-05-12
Attending: PSYCHIATRY & NEUROLOGY
Payer: COMMERCIAL

## 2017-05-12 DIAGNOSIS — F20.9 SCHIZOPHRENIA, UNSPECIFIED TYPE (H): Primary | ICD-10-CM

## 2017-05-12 PROCEDURE — 25000128 H RX IP 250 OP 636: Mod: ZF

## 2017-05-12 PROCEDURE — 96372 THER/PROPH/DIAG INJ SC/IM: CPT | Mod: ZF

## 2017-05-12 NOTE — PROGRESS NOTES
RE: injection appt  Received: Today       Traci Hannah Katherine J RN                   Pt scheduled for today @ 11:30

## 2017-05-12 NOTE — MR AVS SNAPSHOT
After Visit Summary   2017    Jasen Guillen III    MRN: [id number]           Patient Information     Date Of Birth          1995        Visit Information        Provider Department      2017 11:30 AM Nurse, [facility]        Today's Diagnoses     Schizophrenia, unspecified type (H)    -  1       Follow-ups after your visit        Who to contact     Please call your clinic at [phone] to:    Ask questions about your health    Make or cancel appointments    Discuss your medicines    Learn about your test results    Speak to your doctor   If you have compliments or concerns about an experience at your clinic, or if you wish to file a complaint, please contact [facility] Patient Relations at [phone] or email us at [email or web address]         Additional Information About Your Visit        MyChart Information     [employer] is an electronic gateway that provides easy, online access to your medical records. With [employer], you can request a clinic appointment, read your test results, renew a prescription or communicate with your care team.     To sign up for [employer] visit the website at [email or web address]   You will be asked to enter the access code listed below, as well as some personal information. Please follow the directions to create your username and password.     Your access code is: [email or web address]  Expires: 2017  3:30 PM     Your access code will  in 90 days. If you need help or a new code, please contact your [facility] or call [phone] for assistance.        Care EveryWhere ID     This is your Care EveryWhere ID. This could be used by other organizations to access your [place] medical records  [id number]         Blood Pressure from Last 3 Encounters:   17 114/74   17 109/73   17 108/70    Weight from Last 3 Encounters:   17 81.9 kg (180  lb 9.6 oz)   04/04/17 83.7 kg (184 lb 9.6 oz)   03/14/17 85.5 kg (188 lb 9.6 oz)              Today, you had the following     No orders found for display       Primary Care Provider    None       No address on file        Thank you!     Thank you for choosing PSYCHIATRY CLINIC  for your care. Our goal is always to provide you with excellent care. Hearing back from our patients is one way we can continue to improve our services. Please take a few minutes to complete the written survey that you may receive in the mail after your visit with us. Thank you!             Your Updated Medication List - Protect others around you: Learn how to safely use, store and throw away your medicines at www.disposemymeds.org.          This list is accurate as of: 5/12/17 12:06 PM.  Always use your most recent med list.                   Brand Name Dispense Instructions for use    benztropine 0.5 MG tablet    COGENTIN    30 tablet    Take 0.5 tablets (0.25 mg) by mouth 2 times daily as needed       paliperidone 234 MG/1.5ML Susp    INVEGA SUSTENNA    1.5 mL    Inject 1.5 mLs (234 mg) into the muscle every 28 days       propranolol 10 MG tablet    INDERAL    120 tablet    Take 1 tablet (10 mg) by mouth 4 times daily as needed for restlessness       sertraline 100 MG tablet    ZOLOFT    30 tablet    Take 1 tablet (100 mg) by mouth daily

## 2017-05-12 NOTE — NURSING NOTE
"OBSERVATIONS    Prior to administration pt identified by name and :  yes    1.  Appearance:  Neat, well-groomed, dressed casually in clothing appropriate to outside weather  2.  Mood:  Good - he finished his last final prior to coming here today  3.  Affect:  Mood-congruent  4.  Interactions:  Pleasant.  Appropriate.  He's going back home to [place] for the summer.  He leaves Memorial Day weekend and will return for ester.  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  He has made arrangements to continue getting his injections over the summer and will return to this clinic in the .    5.  Eye contact:  appropriate  6.  Side Effects: Previously reported \"once weekly oculogyri crisis\" but this has improved.  Not bothered by it now that the semester is over.   ocuhis is not a new SE   7.  Education:  Nothing new needed  8.  Level of Cooperation:  full  9.  Compliance:  full  10.  Next appointment:  In the fall when he returns to [place] for school.          "

## 2017-05-15 NOTE — PROGRESS NOTES
First Episode Group       Client: Jasen Guillen III  : 1995  MRN: [id number]  Date of Service: 2017  Diagnosis: Mr. Wilmer MAURICIO is being seen for a diagnosis of psychosis  Number of Participants: 7  Group Time: 60 minutes  Facilitators: Roque Beckham, Howard Brizuela, Ph.D., Renetta Springer, [facility], Grace Stearns M.AMartin    The first episode group is based on the cognitive behavioral therapy model that uses psychoeducation, cognitive restructuring, problem solving techniques, and social skills.     Diagnostic criteria for group participation: History of psychosis    Group Topic for Today: Today the group had a guest speaker, John OchoaD. Patients asked questions about medication side effects, including akathisia, tardive dyskinesia, drowsiness, metabolism, and sex drive. The group also discussed the impact of using medication with substance such as alcohol and marijuana.    Description: Active Participant  Mr. Guillen was at ease and engaged other patients.    Plan: Continue with group goals to minimize impact of psychotic symptoms and maintain stability    I was present for and actively participated in the entire group therapy session, my observations of patient's progress and participation are he actively participated in the discussion.    Sera Landrum

## 2017-05-31 ENCOUNTER — TELEPHONE (OUTPATIENT)
Dept: PSYCHIATRY | Facility: CLINIC | Age: 22
End: 2017-05-31

## 2017-06-01 NOTE — TELEPHONE ENCOUNTER
"From: Renetta Springer  Sent: Wednesday, May 31, 2017 5:06 PM  To: Gato Guillen  Subject: Re: Psychiatry Provider for the summer     If I remember right, they had mentioned you need to meet with the  first, but could get in fairly quickly thereafter for the injection.  Can you call and ask the nurse in the clinic when you can get your injection following the 1st appt?      Also, I'm hoping you can complete an JONNY for this provider so we can share records and they can call us if need be.  Can you stop by the clinic anytime before leaving town to sign one?  You can just tell the  and they can get it for you.  If not, I've attached it here and you can complete and email/fax/mail it back to me.  Just let me know what you decide to do.      Thank you!       HELIO Lombardi, [facility]  First Episode Psychosis Program   [facility]  [facility], 2nd floor  [address], [address]  [place] [address]  Direct Phone: [phone]  Fax: [phone]      From: Gato Guillen <[email or web address]>  Sent: Wednesday, May 31, 2017 12:13:57 PM  To: Renetta Springer  Subject: Re: Psychiatry Provider for the summer     Do you know if it would be possible to get my injection on the first appointment, since I am about due at that point anyway?   Thanks,  Gato Guillen    From: Gato Guillen <[email or web address]>  Sent: Wednesday, May 31, 2017 12:11 PM  To: Renetta Springer  Subject: Re: Psychiatry Provider for the summer     Carlitos Jackson,  My appointment is for the 13th of June. And sorry for the late reply I didn't read your email correctly and I missed the part that you wanted to be notified about the appointment. Thanks for all your help. I am still in [place] if you need to talk to me in person but it might be a little inconvenient to drive over to the . So I would prefer not to do that.   Thanks for all your help.      On May 3, 2017 3:29 PM, \"Jewels " "Racheal\" <[email or web address]> wrote:  Here is what I found for psychiatry.  I contacted this psychiatry department and got all the details on scheduling an appointment and with who.  Now, their FEPP has not started yet, but the doctor that will be working in that program is able to see you and has openings.  They have clients see the  first, then schedule a f/u appointment with the doctor.  Their RN will provide the injection in clinic.      What date will you be home in [place]?  They have openings with the  on May 29/30/31, and can have the f/u with the doctor in early June to receive your injection.    All the contact information is listed below and the provider to schedule with.  When you are in clinic next Bill, we should complete an JONNY so we can fax records to this doctor.  Would you mind letting me know when your [place] appointments are schedule for once they are locked in?     First Episode Psychosis Program (FEPP)  [facility] Dept. of Psychiatry  [address] [place] [address], [address] [address]  Service model: Coordinated Specialty Care (CSC)  Website: [email or web address]  Phone: [phone]  Email: [email or web address]  Provider: Dr. Sarath MD ([email or web address])         HELIO Lombardi, [facility]  First Episode Psychosis Program   [facility]  [facility], 2nd floor  [address], [address]  [place] [address]  Direct Phone: [phone]  Fax: [phone]  "

## 2017-06-09 ENCOUNTER — TELEPHONE (OUTPATIENT)
Dept: PSYCHIATRY | Facility: CLINIC | Age: 22
End: 2017-06-09

## 2017-06-09 ENCOUNTER — TRANSFERRED RECORDS (OUTPATIENT)
Dept: HEALTH INFORMATION MANAGEMENT | Facility: CLINIC | Age: 22
End: 2017-06-09

## 2017-06-13 NOTE — PROGRESS NOTES
"  PSYCHIATRY CLINIC PROGRESS NOTE       INTERIM HISTORY                                                 PSYCH CRITICAL ITEM HISTORY  Jasen Guillen III is a 21 year old male with a diagnosis of schizophrenia (for past ~ 2 years) who comes in for a scheduled visit today to evaluate his progress, given his recent discontinuation of oral paliperidone.    Patient stopped the paliperidone 3 mg po qd over two months ago to see if this would help with sx of sedation, low energy, amotivation, and oculogyric spasms. He continues to receive paliperidone injections q month at 234 mg. He also takes occasional low-dose Cogentin for muscle spasms (oculogyric).    Patient reports that his depressive symptoms responded quite well to sertraline and that he was able to increase the dose without problem.     PSYCH ROS:    He now feels that he has normal energy, more normal mood, normal interest and pleasure, and is able to be motivated to do his school work. No feelings of hopelessness or of self-harm. No SI.    Denies paranoia, IOR, hallucinations, agitation. No HI.     Psychotic symptoms appear stable;  Feeling low and hopeless, unable to feel motivated to get out of bed and do his school work. He reports that these feelings are similar to prior episodes, which usually resolved spontaneously within a few days, but which have on at least one occasion in the past been associated with suicidal ideation. He has no SI today, but is very troubled by the depressed mood and requests medications \"to help make it go away so I can concentrate on my school work and not fall behind.\"    PSYCHOSOCIAL HX:    Has been participating actively in Young Adults group.  Has visited with grandparents.  Active in [employer] student group on campus.      MEDICAL ROS:       Denies problems with salivation, muscle stiffness, motor restlessness. Reports occasional \"eye spasms\" \"they roll back in my head\" but at a lesser frequency than previously. Denies other " "abnormal motor movements.  No dizziness.    No GI nausea or constipation.    No musculoskeletal difficulties.    PSYCH and CD Critical Summary Points since July 2016           None.        Patient Active Problem List   Diagnosis     Schizophrenia, unspecified type (H)            ALLERGY                                Review of patient's allergies indicates no known allergies.  MEDICATIONS                               Paliperidone 234 IM q 28 days  Cogentin 0.25 mg po up to bid prn  Sertraline 100 mg po qd     VITALS   /74  Pulse 94  Wt 81.9 kg (180 lb 9.6 oz)   MENTAL STATUS EXAM                                                             Motor:  Walks smoothly in squires, normal arm swing, normal gait.  No UE stifness.  Mild fleeting dyskinetic movements observable in face and hands. No masked facies.  Alertness: Alert, oriented.  Appearance: Neatly and casually dressed, carrying backpack.  Behavior/Demeanor: Calm, cooperative, pleasant, with excellent eye contact.  Appropriately interactive.  Speech: Normal rate and rhythm, normal volume.  Language: Language is cogent, descriptive of his response to treatment. Psychomotor: Normal PM speed. No slowing observed.   Mood: \"Much better.\"  Affect:Full range  Thought Process/Associations: Linear, logical.  Thought Content: Denies delusional thoughts, ideas of reference, SI. HI.  Focused on his coursework and making a strong finish to the semester.  Perception:  Denies hallucinations. No illusions.  Insight: Excellent.  Judgment: Excellent  Cognition:  Intact    LABS and DATA         PHQ9 TODAY = 2  PHQ-9 SCORE 1/24/2017 1/31/2017   Total Score 9 2         DIAGNOSIS     Schizophrenia     ASSESSMENT                                     Schizophrenia, under good control, with some very mild dyskinesia observed-- likely due to underlying illness, but may be partially due to lowering of paliperidone dose with discontinuation of oral meds.    New onset depressive " "symptoms, which have responded well to sertaline 100 mg po qd.     PLAN                                                                                                       1) PSYCHOTROPIC MEDICATIONS:   Continue iInjectable paliperidone 234 mg po q month, with COgentin 0.25 mg po up to bid prn.     Continue sertraline 100 mg po qd         2) THERAPY:      Will continue Young Adults group once per month.    Interested in individual psychotherapy-- will refer to Howard Mcmahon, PhD for evaluation for individual sessions.    Will work with Darrick Gomez RANDALL to develop Relapse Plan.    Will began plans for interim care over hte summer when he returns to [place] to be with family.      3) RTC: April 7 for injection.    4) CRISIS NUMBERS:   Provided routinely in AVS        TREATMENT RISK STATEMENT:  The risks, benefits, alternatives and potential adverse effects have been discussed and are understood by the patient/ patient's guardian. The pt understands the risks of using street drugs or alcohol.  There are no medical contraindications, the pt agrees to treatment with the ability to do so.  The patient understands to call 911 or come to the nearest ED if life threatening or urgent symptoms present.    PSYCHIATRY CLINIC INDIVIDUAL PSYCHOTHERAPY NOTE   Length of session: 20 minutes  Date of most recent treatment plan: 01-31-17  Date next due treatment plan: 04-30-17    Subjective: \"I feel much better now and am ready to really focus on finishing up the semester.\"     Plan:    Continue supportive psychotherapy during monthly medication management visits.   Continue Young Adults Group    Psychotherapy services during this visit included  myself and Jasen Guillen.  Issues addressed in therapy included: how to focus on his coursework and exams while managing his overall stress levels and making sure he gets adequate sleep--  and were addressed using primarily psychodynamic, supportive and CBT techniques.        TREATMENT  PLAN  "         SYMPTOMS;PROBLEMS   MEASURABLE GOALS;    FUNCTIONAL IMPROVEMENT INTERVENTIONS;    GAINS MADE DISCHARGE CRITERIA   Low energy, lack of motivation Patient will experience a significant improvement in energy and motivation, and will be able to engage in outsidesocializing activities as well as classwork Supportive Psychotherapy once per month    Individual psychotherapy, once per week Ongoing engagement with social activities   Ideas of reference during times of stress Patient will no longer experience ideas of reference Monthly injectable paliperidone Two years without symptoms, followed by two years of monitoring without symptoms relapse   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       Faculty: Lynnette Rios M.D.

## 2017-06-19 ENCOUNTER — CARE COORDINATION (OUTPATIENT)
Dept: PSYCHIATRY | Facility: CLINIC | Age: 22
End: 2017-06-19

## 2017-06-19 NOTE — PROGRESS NOTES
"Rec'd direct transfer call (via Intake) from pt's father.  Per Intake, dad is calling with questions related to Invega Sustenna.  Prior to receiving call, writer noted that consent to communicate on file for pt's father dated 1/23/17 is invalid.    Pt's dad reports that he is calling regarding an \"emergency\" matter as pt is due for his Invega injection today.  Pt's dad states that pt is currently seeing a new provider in [place] and the specialty pharmacist in [place] is seeking a \"specialty billing code\" needed to process this injection.  Dad reports confusion with this request and seeks writer clarification.  Asks many questions about billing and insurance that writer is unable to answer.  Writer briefly explained that this clinic does not process the prescription nor handle the insurance billing.  Informed him that this clinic contacts [facility] to check into insurance coverage prior to administering injections and then administers injections if they are covered.  Suggested contacting [facility] or [facility] directly if a specific code is needed.  Passed along contact information for Pharmacies.  Dad agrees to try this first and will call clinic back if needed.    Routed to Jewels Racheal- SW as FYI  "

## 2017-06-23 NOTE — TELEPHONE ENCOUNTER
From: Renetta Springer  Sent: Friday, June 9, 2017 10:47 AM  To: Gato Guillen  Subject: Re: Psychiatry Provider for the summer     I sent the records to you via an encrypted email.  Please let me know if you don't get it.       Renetta Springer, MSW, LGSW  First Episode Psychosis Program   [facility]  [facility], 2nd floor  [address], [address]  [place] [address]  Direct Phone: [phone]  Fax: [phone]    _____________________________________________________________________________________________________________________________________________________   From: Gato Guillen <[email or web address]>  Sent: Thursday, June 8, 2017 10:55:31 PM  To: Smita Palomares; Renetta Springer  Cc: [email or web address]  Subject: Re: Psychiatry Provider for the summer     RenettaGato has his appointment with Dr. Jasen Rich at 12:30 tomorrow (Friday), and I was wondering if Dr. Rich has the information he needs for Gato s injection?     If not, would you be able to email it to his office?  For your convenience, I m copying Justine Prieto who is in Dr. Rich s office, and you can provide that information to her.  Or you can reach her at [phone].     Thanks very much!     Gato Guillen [facility] [place]  [address] [place] [address]   [phone] Cell  [email or web address]     The truth is: mental illness affects more people than you may think, and we need to talk about it. It s Okay to Say    [email or web address]  This message may contain confidential information. If it has been sent to you in error, please reply to advise the sender of the error and then immediately delete this message and any attachments. Thank  "you    _____________________________________________________________________________________________________________________________________________________    From: Smita Palomares <[email or web address]>  Date: Monday, June 5, 2017 at 2:43 PM  To: Renetta Springer <[email or web address]>, Gato Guillen <[email or web address]>  Cc: Gato Guillen <[email or web address]  Subject: Re: Psychiatry Provider for the summer     Hi Bill  Yes we are all in the same department.  But Dr Rich is our person for outpatient care.  We do not have our early Psychosis program started yet but will transfer Gato there whenever this is available.     Best for Gato to continue with contacting Dr Rich.  I will let Dr Gaines and Courtney know.     Does he need an Internet immediately or can we do this in July?     Smita Palomares   Get [place] for iOS     _____________________________________________________________________________________________________________________________________________________   On Mon, Jun 5, 2017 at 12:08 PM -0400, \"Gato Guillen\" <[email or web address]> wrote:  Dr. Palomares (and Renetta),     To follow up on our previous conversation between you and Dr. Rios, please see email below from Renetta Springer, who works with Dr. Rios at [facility] Department of Psychiatry.     Gato informs me that he contacted the highlighted phone number below and has an appointment with Dr. Felipe Clemens s office next Tuesday the 13th.  The appointment apparently is with Lucita Dent, who is a .  The  informed Gato that he should follow up his appointment on the 13th with a psychiatrist appointment, presumably with Dr. Clemens but it doesn t sound like that appointment has been scheduled.     Gato was asking me if this is the same department as Dr. Rich, who you mentioned that Gato should contact.  Since Gato s last day in [place] before we " leave on a two-week backbacking trip is Monday the 19th, I m wondering if you (and Dr. Rios) would recommend that Gato see Dr. Rich instead?     Also Gato probably needs to schedule a medical exam because he needs to fill out the attached medical release form for the backpacking trip.  (Or perhaps Dr. Rios s office or another physician who has seen Gato recently could fill it out instead.)     Thanks very much for all your help.     Gato Guillen [address] [address]   [phone] Cell  [email or web address]     The truth is: mental illness affects more people than you may think, and we need to talk about it. It s Okay to Say    [email or web address]  This message may contain confidential information. If it has been sent to you in error, please reply to advise the sender of the error and then immediately delete this message and any attachments. Thank you    _____________________________________________________________________________________________________________________________________________________   From: Renetta Springer <[email or web address]>  Date: Wednesday, May 3, 2017 at 3:29 PM  To: Gato Guillen <[email or web address]>, Gato Guillen <[email or web address]>  Subject: Psychiatry Provider for the summer     Here is what I found for psychiatry.  I contacted this psychiatry department and got all the details on scheduling an appointment and with who.  Now, their FEPP has not started yet, but the doctor that will be working in that program is able to see you and has openings.  They have clients see the  first, then schedule a f/u appointment with the doctor.  Their RN will provide the injection in clinic.      What date will you be home in [place]?  They have openings with the  on May 29/30/31, and can have the f/u with the doctor in early June to receive your  injection.    All the contact information is listed below and the provider to schedule with.  When you are in clinic next Bill, we should complete an JONNY so we can fax records to this doctor.  Would you mind letting me know when your [place] appointments are schedule for once they are locked in?     First Episode Psychosis Program (FEPP)  [facility] Dept. of Psychiatry  [address] [facility] [place] [address]  Service model: [facility] ([facility])  Website: [email or web address]  Phone: [phone]  Email: [email or web address]  Provider: Dr. Sarath MD ([email or web address])

## 2017-08-21 ENCOUNTER — TRANSFERRED RECORDS (OUTPATIENT)
Dept: HEALTH INFORMATION MANAGEMENT | Facility: CLINIC | Age: 22
End: 2017-08-21

## 2017-09-14 ENCOUNTER — TELEPHONE (OUTPATIENT)
Dept: PSYCHIATRY | Facility: CLINIC | Age: 22
End: 2017-09-14

## 2017-09-14 ENCOUNTER — ALLIED HEALTH/NURSE VISIT (OUTPATIENT)
Dept: PSYCHIATRY | Facility: CLINIC | Age: 22
End: 2017-09-14
Payer: COMMERCIAL

## 2017-09-14 ENCOUNTER — TRANSFERRED RECORDS (OUTPATIENT)
Dept: HEALTH INFORMATION MANAGEMENT | Facility: CLINIC | Age: 22
End: 2017-09-14

## 2017-09-14 DIAGNOSIS — Z79.899 ENCOUNTER FOR LONG-TERM (CURRENT) USE OF MEDICATIONS: ICD-10-CM

## 2017-09-14 DIAGNOSIS — F20.9 SCHIZOPHRENIA (H): ICD-10-CM

## 2017-09-14 DIAGNOSIS — F20.9 SCHIZOPHRENIA, UNSPECIFIED TYPE (H): Primary | ICD-10-CM

## 2017-09-14 PROCEDURE — 96372 THER/PROPH/DIAG INJ SC/IM: CPT | Mod: ZF

## 2017-09-14 PROCEDURE — 25000128 H RX IP 250 OP 636: Mod: ZF

## 2017-09-14 NOTE — TELEPHONE ENCOUNTER
Rec'd orders from Dr. Rios for pt to resume his Invega Sustenna 234 mg IM every 4 weeks.  Ok to administer today.  Pt is not scheduled with provider until 10/6/17, however is due for injection today.  Will see if clinic SW has records from Dr. Jasen Rich, whom pt was seeing for interim care over the summer in [place], to confirm last injection date.    Will also notify CAM team of injection resumption.

## 2017-09-14 NOTE — MR AVS SNAPSHOT
After Visit Summary   9/14/2017    Jasen Guillen III    MRN: [id number]           Patient Information     Date Of Birth          1995        Visit Information        Provider Department      9/14/2017 11:00 AM Nurse, [facility] Psychiatry Psychiatry Clinic        Today's Diagnoses     Schizophrenia, unspecified type (H)    -  1    Encounter for long-term (current) use of medications           Follow-ups after your visit        Your next 10 appointments already scheduled     Oct 06, 2017  8:30 AM CDT   First Episode Return with Lynnette Rios MD   Psychiatry Clinic ([facility])    [address] [facility] [id number] [phone]            Oct 12, 2017 11:00 AM CDT   Nurse Visit with [facility] Psychiatry Nurse   Psychiatry Clinic ([facility])    [address] [facility] [address] [phone]              Who to contact     Please call your clinic at [phone] to:    Ask questions about your health    Make or cancel appointments    Discuss your medicines    Learn about your test results    Speak to your doctor   If you have compliments or concerns about an experience at your clinic, or if you wish to file a complaint, please contact [facility] Physicians Patient Relations at [phone] or email us at [email or web address]         Additional Information About Your Visit        MyChart Information     Abiquo is an electronic gateway that provides easy, online access to your medical records. With Abiquo, you can request a clinic appointment, read your test results, renew a prescription or communicate with your care team.     To sign up for Stega Networkst visit the website at [email or web address]   You will be asked to enter the access code listed below, as well as some personal information. Please follow the directions to create your username and  password.     Your access code is: [email or web address]  Expires: 2017  2:46 PM     Your access code will  in 90 days. If you need help or a new code, please contact your [facility] Clinic or call [phone] for assistance.        Care EveryWhere ID     This is your Care EveryWhere ID. This could be used by other organizations to access your [place] medical records  [id number]         Blood Pressure from Last 3 Encounters:   17 114/74   17 109/73   17 108/70    Weight from Last 3 Encounters:   17 81.9 kg (180 lb 9.6 oz)   17 83.7 kg (184 lb 9.6 oz)   17 85.5 kg (188 lb 9.6 oz)              Today, you had the following     No orders found for display         Where to get your medicines      Some of these will need a paper prescription and others can be bought over the counter.  Ask your nurse if you have questions.     You don't need a prescription for these medications     paliperidone 234 MG/1.5ML Susp          Primary Care Provider    None       No address on file        Equal Access to Services     ESTRELLITA [facility] CenterPONCHO : Hadii vladislav melgar Soaline, waaxda lumindyadaha, qaybta kaalmada adegilbert, adrian guallpa . So [facility] [phone].    ATENCIÓN: Si habla español, tiene a ventura disposición servicios gratuitos de asistencia lingüística. Llame al [phone].    We comply with applicable federal civil rights laws and [place] laws. We do not discriminate on the basis of race, color, national origin, age, disability sex, sexual orientation or gender identity.            Thank you!     Thank you for choosing PSYCHIATRY CLINIC  for your care. Our goal is always to provide you with excellent care. Hearing back from our patients is one way we can continue to improve our services. Please take a few minutes to complete the written survey that you may receive in the mail after your visit with us. Thank you!             Your Updated  Medication List - Protect others around you: Learn how to safely use, store and throw away your medicines at www.disposemymeds.org.          This list is accurate as of: 9/14/17  2:46 PM.  Always use your most recent med list.                   Brand Name Dispense Instructions for use Diagnosis    benztropine 0.5 MG tablet    COGENTIN    30 tablet    Take 0.5 tablets (0.25 mg) by mouth 2 times daily as needed    Psychosis, unspecified psychosis type       paliperidone 234 MG/1.5ML Susp    INVEGA SUSTENNA    1.5 mL    Inject 1.5 mLs (234 mg) into the muscle every 28 days    Schizophrenia (H)       propranolol 10 MG tablet    INDERAL    120 tablet    Take 1 tablet (10 mg) by mouth 4 times daily as needed for restlessness    Psychosis, unspecified psychosis type       sertraline 100 MG tablet    ZOLOFT    30 tablet    Take 1 tablet (100 mg) by mouth daily    Depression, unspecified depression type

## 2017-09-14 NOTE — TELEPHONE ENCOUNTER
Racheal, [place], [facility]  Rosalie Dwyer, CL                     I did confirm with his dad that the dose of 234mg was last given on 8/21.     Dad is going to work on getting the records right now and send to me.  We have no JONNY, and Gato didn't complete and JONNY while at the [place] clinic either.       Awaiting records

## 2017-09-14 NOTE — TELEPHONE ENCOUNTER
Renetta provided writer with a  Print out from [facility] confirming that last injection of Invega Sustenna 234 mg IM was administered on 8/21/17.  Injection site L deltoid.  Writer administered injection to pt following receipt of documentation.     Original sent to scanning, copy temporarily retained in clinic.    Of note, writer obtained JONNY and consent to communicate forms from pt.

## 2017-09-14 NOTE — NURSING NOTE
"Jasen Guillen III comes into clinic today at the request of Dr. Rios Ordering Provider for Invega Sustenna.    OBSERVATIONS    Prior to administration pt identified by name and :  Yes    1.  Appearance:  Casual dress and weather-appropriate  2.  Mood:  \"good\"  3.  Affect:  Congruent to mood, smiled appropriately during visit.  4.  Interactions:  Appropriate.  Pt has returned to school at [employer] after spending the summer in [place].  Saw Dr. Rich for follow-up care in [place] and has now returned and is scheduled with Dr. Rios on 10/6.  Writer obtained consent to communicate and JONNY forms from pt.  Pt reports that he has been doing well over the summer.  5.  Eye contact:  Good  6.  Side Effects:  Pt mentions that over the summer, his mother noticed some jaw chewing motions.  This was not observed during visit with writer.  Pt denies any other abnormal movements to body.  Mentions that the previous SE oculogyric crisis improved once his Cogentin was increased- now takes a full tab.  7.  Education:  Continue to monitor for abnormal involuntary movements  8.  Level of Cooperation:  Good  9.  Compliance:  Full  10.  Next appointment:  10/6/17- Dr. Rios    This service provided today was under the supervising provider of the day Dr. Rios, who was available if needed.    Reason for visit: Med Injection only Invega Sustenna 234 mg    Rosalie Dwyer                  "

## 2017-09-14 NOTE — TELEPHONE ENCOUNTER
First Episode Psychosis Program    Incoming/Outgoing Call  [facility]    Outgoing Call To: Patient, Gato Guillen.  AND, dad, Gato Guillen    Reason for Call:  Obtain records from [facility] psychiatry clinic in order to administer injection today.  We need to confirm dose, the date, and have records in hand of that information from their clinic.      Response/Plan:  VMM left for the patient requesting to give verbal consent to [facility] to speak with us.  PC with pt's dad, who reports being able to obtain that information on Gato's behalf and can email it to me.      __________________________________________________________________________________________________________________________________________________________________________    First Episode Psychosis Program    Incoming/Outgoing Email  [facility]    Correspondence with: Pt's dad, Gato Guillen    Reason for contact:  To obtain records from the patient's last psychiatry visit.      Content:    From: Gato Guillen <[email or web address]>  Sent: Thursday, September 14, 2017 11:00:48 AM  To: Renetta Guillen  Subject: Re: Scheduling psych f/u     Rneetta,     Here is a screenshot of the information from Dr. Rich s office.  Thanks.    [IMAGE OF ELECTRONIC MEDICAL RECORD- printed and will be filed in chart]    Gato Guillen [facility] Policy [place]  [address] [place] [address]   [phone] Cell  [email or web address]     The truth is: mental illness affects more people than you may think, and we need to talk about it. It s Okay to Say    [email or web address]  This message may contain confidential information. If it has been sent to you in error, please reply to advise the sender of the error and then immediately delete this message and any attachments. Thank  you  _________________________________________________________________________________________________________________________________________________________________________  From: Renetta Springer  Sent: Thursday, September 14, 2017 11:01 AM  To: Gato Guillen; Gato Guillen  Subject: Re: Scheduling psych f/u     Thank you!       HELIO Lombardi LGSW  First Episode Psychosis Program   [facility]  [facility], 2nd floor  [address], [address]  [place] [address]  Direct Phone: [phone]  Fax: [phone]      Will route to patient's current psychiatric provider(s) as an FYI.   Please call or EPIC message with any questions or concerns.    HELIO Lombardi LGSW  [phone]

## 2017-09-15 ENCOUNTER — TELEPHONE (OUTPATIENT)
Dept: PSYCHIATRY | Facility: CLINIC | Age: 22
End: 2017-09-15

## 2017-09-15 NOTE — TELEPHONE ENCOUNTER
On 9/14/2017 the patient signed an JONNY authorizing the release of records from [facility]/[place], [place].  I faxed the form to [phone], sent the original JONNY to scanning and held a copy in Psychiatry until scanning complete/confirmed.Sita Tyler/JOVITA

## 2017-09-15 NOTE — TELEPHONE ENCOUNTER
Carlitos Wiggins,     Looks like nothing has changed and this is still a covered benefit.     Thanks,     Sheldon Rothman

## 2017-09-28 ENCOUNTER — TELEPHONE (OUTPATIENT)
Dept: PSYCHIATRY | Facility: CLINIC | Age: 22
End: 2017-09-28

## 2017-09-28 NOTE — TELEPHONE ENCOUNTER
Rec'd incoming letter from [facility] Electronic Medical Records on CD.  CD included.    Writer obtained Records from CD including 264 pages.  Records from [id number], and records from 6/2017 until current.    Routed to clinic SW for feedback on pertinent notes

## 2017-10-06 ENCOUNTER — OFFICE VISIT (OUTPATIENT)
Dept: PSYCHIATRY | Facility: CLINIC | Age: 22
End: 2017-10-06
Attending: PSYCHIATRY & NEUROLOGY
Payer: COMMERCIAL

## 2017-10-06 DIAGNOSIS — F32.5 MAJOR DEPRESSION IN COMPLETE REMISSION (H): ICD-10-CM

## 2017-10-06 DIAGNOSIS — F20.3 UNDIFFERENTIATED SCHIZOPHRENIA (H): Primary | ICD-10-CM

## 2017-10-06 NOTE — MR AVS SNAPSHOT
After Visit Summary   10/6/2017    Jasen Guillen III    MRN: [id number]           Patient Information     Date Of Birth          1995        Visit Information        Provider Department      10/6/2017 8:30 AM Lynnette Rios MD Psychiatry Clinic        Today's Diagnoses     Undifferentiated schizophrenia (H)    -  1    Major depression in complete remission (H)           Follow-ups after your visit        Your next 10 appointments already scheduled     Oct 12, 2017 11:00 AM CDT   Nurse Visit with [facility] Psychiatry Nurse   Psychiatry Clinic ([facility])    [address] [facility] [id number] [phone]            Nov 03, 2017  8:30 AM CDT   First Episode Return with Lynnette Rios MD   Psychiatry Clinic ([facility])    [address] [facility] [id number] [phone]              Who to contact     Please call your clinic at [phone] to:    Ask questions about your health    Make or cancel appointments    Discuss your medicines    Learn about your test results    Speak to your doctor   If you have compliments or concerns about an experience at your clinic, or if you wish to file a complaint, please contact [facility] Physicians Patient Relations at [phone] or email us at [email or web address]         Additional Information About Your Visit        MyChart Information     Gaudenat is an electronic gateway that provides easy, online access to your medical records. With PeopleMatter, you can request a clinic appointment, read your test results, renew a prescription or communicate with your care team.     To sign up for Gaudenat visit the website at [email or web address]   You will be asked to enter the access code listed below, as well as some personal information. Please follow the directions to create your username and password.     Your  access code is: [email or web address]  Expires: 2017  2:46 PM     Your access code will  in 90 days. If you need help or a new code, please contact your [facility] Physicians Clinic or call [phone] for assistance.        Care EveryWhere ID     This is your Care EveryWhere ID. This could be used by other organizations to access your [place] medical records  [id number]         Blood Pressure from Last 3 Encounters:   17 114/74   17 109/73   17 108/70    Weight from Last 3 Encounters:   17 81.9 kg (180 lb 9.6 oz)   17 83.7 kg (184 lb 9.6 oz)   17 85.5 kg (188 lb 9.6 oz)              Today, you had the following     No orders found for display       Primary Care Provider    None Specified       No primary provider on file.        Equal Access to Services     JENNIFER CASTILLO : Hadii vladislav mccabeo Tung, waaxda lujoan, qaybta kaalmada drew, adrian guallpa . So [facility] [place] [phone].    ATENCIÓN: Si habla español, tiene a ventura disposición servicios gratuitos de asistencia lingüística. Silas al [phone].    We comply with applicable federal civil rights laws and [place] laws. We do not discriminate on the basis of race, color, national origin, age, disability, sex, sexual orientation, or gender identity.            Thank you!     Thank you for choosing PSYCHIATRY CLINIC  for your care. Our goal is always to provide you with excellent care. Hearing back from our patients is one way we can continue to improve our services. Please take a few minutes to complete the written survey that you may receive in the mail after your visit with us. Thank you!             Your Updated Medication List - Protect others around you: Learn how to safely use, store and throw away your medicines at www.disposemymeds.org.          This list is accurate as of: 10/6/17  9:21 AM.  Always use your most recent med list.                   Brand Name Dispense  Instructions for use Diagnosis    benztropine 0.5 MG tablet    COGENTIN    30 tablet    Take 0.5 tablets (0.25 mg) by mouth 2 times daily as needed    Psychosis, unspecified psychosis type       paliperidone 234 MG/1.5ML Susp    INVEGA SUSTENNA    1.5 mL    Inject 1.5 mLs (234 mg) into the muscle every 28 days    Schizophrenia (H)       propranolol 10 MG tablet    INDERAL    120 tablet    Take 1 tablet (10 mg) by mouth 4 times daily as needed for restlessness    Psychosis, unspecified psychosis type       sertraline 100 MG tablet    ZOLOFT    30 tablet    Take 1 tablet (100 mg) by mouth daily    Depression, unspecified depression type

## 2017-10-06 NOTE — PROGRESS NOTES
"  PSYCHIATRY CLINIC PROGRESS NOTE     CC:  \"Things are going really great.\"    INTERIM HISTORY                                                   Jasen Guillen III is a 21 year old male with a diagnosis of schizophrenia (for past ~ 2 years) who comes in for a scheduled visit today.    Was away over the summer back home in [place], and was followed in [facility] program there.  Remained adherent with paliperidone injections, 234 mg q month.    Has also been taking Cogentin 0.5 mg po qam for oculogyric spasms, which are now resolved, and sertraline 100 mg po qam for depression, also now resolved.    Patient reports that has had a very good summer.  He went on a camping trip with his father and 2 brothers per 2 weeks, which she found very enjoyable. He has been symptom free since last spring, and reports that he is beginning to feel back to normal.  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    Given his psychiatric improvement, he wonders about a possible decrease in the paliperidone injection.    PSYCH ROS:    Psychotic symptoms:  Patient denies any paranoid symptoms for the past 7 months, no ideas of reference, no hallucinations, no disorganized thinking. Anhedonia and amotivation have completely resolved. Patient no longer feels socially withdrawn.    Mood symptoms: Patient reports that he has a very stable mood, with strong interest and pleasure in his daily activities, strong ability to enjoy leisure activities, normal energy. Sleep and appetite are stable. No feelings of hopelessness or of self-harm. No SI.    Anxiety symptoms: Not present    Substance use: Not present.    Obsessive-compulsive symptoms: Not present    MEDICAL ROS:       Constitutional: No fever, fatigue, weight loss or weight gain.    Neurologic: Denies problems with salivation, muscle stiffness, motor restlessness. Eye spasms have resolved. Denies other abnormal motor movements.  No dizziness.    HEENT: Mild dry mouth. No dry " "eyes.    Cardiovascular: No chest pain or palpitations.    Respiratory: No shortness of breath, dyspnea on exertion, or wheezing.    Gastrointestinal: No nausea or constipation     : No symptoms reported    Musculoskeletal:No musculoskeletal difficulties.    PSYCHOSOCIAL HX:    Continues to participate actively in [employer] group.  Taking 3 courses this semester and doing well so far.  Living a dorm with a roommate which he finds enjoyable.  Very active in [employer] student group on campus which is where he does most of his socializing leisure activities.    PAST PSYCH HISTORY:    Received intensive treatment 2 years ago for an enduring episode of psychosis that was characterized predominantly by ideas of reference (feeling that he was being talked about insolvency would listen to over the Internet) he also experienced disorganized thinking and disorganized behavior.    Of note, this patient's past and current presentation is characterized by the presence of mild chronic dyskinetic movements.    ALLERGY                                Review of patient's allergies indicates no known allergies.  MEDICATIONS                               Paliperidone 234 IM q 28 days  Cogentin 0.5 mg po qamSertraline 100 mg po qd     VITALS he is he      MENTAL STATUS EXAM                                                             Orientation:  x4  Gait and Station:  Walks smoothly in squires, normal arm swing, normal gait.  No UE stifness.  Mild fleeting dyskinetic movements observable in face and hands. No masked facies.  Alertness: Alert, oriented.  Appearance: Neatly and casually dressed, carrying backpack.  Behavior/Demeanor: Calm, cooperative, pleasant, with excellent eye contact.  Appropriately interactive.  Speech: Normal rate and rhythm, normal volume.  Language: Language is cogent, descriptive of his response to treatment. D  Psychomotor: Normal PM speed. No slowing observed.   Mood: \"very good.\"  Affect:Full range, " expressive, appropriate to content.  Thought Process/Associations: Linear, logical.  Thought Content: Denies delusional thoughts, ideas of reference, SI. HI.  Focused on his upcoming midterms.  Perception:  Denies hallucinations. No illusions.  Insight: Excellent.  Judgment: Excellent  Cognition:  Intact  Recent and Remote Memory: Intact  Attention Span and Concentration:  Intact  Fund of Knowledge:excellent        DIAGNOSIS     Schizophrenia, in remission  Major depression, in remission     ASSESSMENT                                     Schizophrenia, acute phase well resolved, though with some ongoing mild dyskinesia observed in extremities-- likely due to underlying illness.  I wonder about the particular subtype of this patient's psychotic illness, and possibly an underlying inflammatory picture, given the picture of the dyskinesias, need for a fairly high dose of antipsychotic medication, at least initially, and his overall response pattern which has been quite excellent once intensive multimodal treatment is undertaken.    In addition, the patient had experienced new onset of major depressive symptoms in the spring. These have responded well to sertraline 100 mg by mouth daily, and today the patient is now showing better mood and more full affect than I have ever seen him previously.    Also of note, his prior negative symptoms of lack of motivation, lack of energy, and anhedonia, now are completely resolved. The patient reports that he is experiencing strong motivation and enjoyment, and is reaching out to connect emotionally with others which has had a good benefit on his overall functioning. For this patient, his daryn and his engagement with the [employer] youth group have been particularly helpful.     PLAN                                                                                                       1) PSYCHOTROPIC MEDICATIONS:   Continue iInjectable paliperidone 234 mg po q month, with Cogentin 0.5  "mg po q am.  Consider decrease in paliperidone dose at next visit. We would hope to be able to decrease the Cogentin as well, but this will depend on the nature and severity of any dyskinetic movements. Of course, there is a risk that dyskinetic movements may increase when we reduce the paliperidone, but our hope is that eventually these might resolve intensity as well.     Continue sertraline 100 mg po qd.     We should strongly consider initiating NAC and omega-3 essential fatty acids at next visit       2) THERAPY:      Will continue [employer] group once per month.    Interested in individual psychotherapy-- will refer to Howard Mcmahon, PhD for evaluation for individual sessions.      3) RTC: Next week for injection and in 6 weeks to see me.    4) CRISIS NUMBERS:   Provided routinely in AVS        TREATMENT RISK STATEMENT:  The risks, benefits, alternatives and potential adverse effects have been discussed and are understood by the patient/ patient's guardian. The pt understands the risks of using street drugs or alcohol.  There are no medical contraindications, the pt agrees to treatment with the ability to do so.  The patient understands to call 911 or come to the nearest ED if life threatening or urgent symptoms present.    PSYCHIATRY CLINIC INDIVIDUAL PSYCHOTHERAPY NOTE   Length of session: 20 minutes  Date of most recent treatment plan:today      Subjective: \"I am doing really well.\"   Plan:    Continue supportive psychotherapy during monthly medication management visits.   Continue [employer] [facility]    Psychotherapy services during this visit included  myself and Jasen Guillen.  Issues addressed in therapy included: how to focus on enjoying his coursework and also staying involved in leisure and daryn-based activities  and were addressed using primarily psychodynamic, supportive and CBT techniques.        TREATMENT  PLAN          SYMPTOMS;PROBLEMS   MEASURABLE GOALS;    FUNCTIONAL IMPROVEMENT " INTERVENTIONS;    GAINS MADE DISCHARGE CRITERIA   Low energy, lack of motivation Patient will experience a significant improvement in energy and motivation, and will be able to engage in outsidesocializing activities as well as classwork Supportive Psychotherapy once per month    Individual psychotherapy, once per week Ongoing engagement with social activities   Ideas of reference during times of stress Patient will no longer experience ideas of reference Monthly injectable paliperidone  IOR have resolved at present  Two years without symptoms, followed by two years of monitoring without symptoms relapse   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       Faculty: Lynnette Rios M.D.

## 2017-10-12 ENCOUNTER — ALLIED HEALTH/NURSE VISIT (OUTPATIENT)
Dept: PSYCHIATRY | Facility: CLINIC | Age: 22
End: 2017-10-12
Attending: PSYCHIATRY & NEUROLOGY
Payer: COMMERCIAL

## 2017-10-12 DIAGNOSIS — F20.3 UNDIFFERENTIATED SCHIZOPHRENIA (H): Primary | ICD-10-CM

## 2017-10-12 DIAGNOSIS — Z79.899 ENCOUNTER FOR LONG-TERM (CURRENT) USE OF MEDICATIONS: ICD-10-CM

## 2017-10-12 PROCEDURE — 25000128 H RX IP 250 OP 636: Mod: ZF

## 2017-10-12 PROCEDURE — 96372 THER/PROPH/DIAG INJ SC/IM: CPT | Mod: ZF

## 2017-10-12 NOTE — NURSING NOTE
"Jasen Guillen III comes into clinic today at the request of Dr. Rios Ordering Provider for Invega Sustenna.    OBSERVATIONS    Prior to administration pt identified by name and :  Yes    1.  Appearance:  Casual dress and weather appropriate  2.  Mood:  \"good\"  3.  Affect:  Congruent to mood  4.  Interactions:  Appropriate.  Pt has completed classes for the day, plans to go home and study.  Will be seeing family members this weekend who are visiting from [place].  Is looking forward to this.  5.  Eye contact:  Good  6.  Side Effects:  Denies.  Previously mentioned that his mother had noticed abnormal mouth movements over the summer.  Writer did not see any today.  7.  Education:  None needed  8.  Level of Cooperation:  Good  9.  Compliance:  Full  10.  Next appointment:  - provider, - injection      This service provided today was under the supervising provider of the day n/a, who was available if needed.    Reason for visit: Med Injection only Invega Sustenna 234 mg    Rosalie Dwyer    "

## 2017-10-12 NOTE — MR AVS SNAPSHOT
After Visit Summary   10/12/2017    Jasen Guillen III    MRN: [id number]           Patient Information     Date Of Birth          1995        Visit Information        Provider Department      10/12/2017 11:00 AM Nurse, [email or web address] Psychiatry Psychiatry Clinic        Today's Diagnoses     Undifferentiated schizophrenia (H)    -  1    Encounter for long-term (current) use of medications           Follow-ups after your visit        Your next 10 appointments already scheduled     Nov 03, 2017  8:30 AM CDT   First Episode Return with Lynnette Rios MD   Psychiatry Clinic ([facility])    [address] [facility] [id number] [phone]            Nov 09, 2017 10:30 AM CST   Nurse Visit with [email or web address] Psychiatry Nurse   Psychiatry Clinic ([facility])    [address] [facility] [address] [phone]              Who to contact     Please call your clinic at [phone] to:    Ask questions about your health    Make or cancel appointments    Discuss your medicines    Learn about your test results    Speak to your doctor   If you have compliments or concerns about an experience at your clinic, or if you wish to file a complaint, please contact [facility] Patient Relations at [phone] or email us at [email or web address]         Additional Information About Your Visit        MyChart Information     iGoOn s.r.l. is an electronic gateway that provides easy, online access to your medical records. With iGoOn s.r.l., you can request a clinic appointment, read your test results, renew a prescription or communicate with your care team.     To sign up for GlobeInt visit the website at [email or web address]   You will be asked to enter the access code listed below, as well as some personal information. Please follow the directions to create your username and  password.     Your access code is: [email or web address]  Expires: 2017  2:46 PM     Your access code will  in 90 days. If you need help or a new code, please contact your [facility] or call [phone] for assistance.        Care EveryWhere ID     This is your Care EveryWhere ID. This could be used by other organizations to access your [place] medical records  [id number]         Blood Pressure from Last 3 Encounters:   17 114/74   17 109/73   17 108/70    Weight from Last 3 Encounters:   17 81.9 kg (180 lb 9.6 oz)   17 83.7 kg (184 lb 9.6 oz)   17 85.5 kg (188 lb 9.6 oz)              Today, you had the following     No orders found for display       Primary Care Provider    None Specified       No primary provider on file.        Equal Access to Services     JENNIFER CASTILLO : Hadii vladislav Ruby, wadesiree bazzi, qazoraida samaniegoalmarain russell, adrian guallpa . So [facility] [phone].    ATENCIÓN: Si habla español, tiene a ventura disposición servicios gratuitos de asistencia lingüística. Llame al [phone].    We comply with applicable federal civil rights laws and [place] laws. We do not discriminate on the basis of race, color, national origin, age, disability, sex, sexual orientation, or gender identity.            Thank you!     Thank you for choosing PSYCHIATRY CLINIC  for your care. Our goal is always to provide you with excellent care. Hearing back from our patients is one way we can continue to improve our services. Please take a few minutes to complete the written survey that you may receive in the mail after your visit with us. Thank you!             Your Updated Medication List - Protect others around you: Learn how to safely use, store and throw away your medicines at www.disposemymeds.org.          This list is accurate as of: 10/12/17 11:03 AM.  Always use your most recent med list.                    Brand Name Dispense Instructions for use Diagnosis    benztropine 0.5 MG tablet    COGENTIN    30 tablet    Take 0.5 tablets (0.25 mg) by mouth 2 times daily as needed    Psychosis, unspecified psychosis type       paliperidone 234 MG/1.5ML Susp    INVEGA SUSTENNA    1.5 mL    Inject 1.5 mLs (234 mg) into the muscle every 28 days    Schizophrenia (H)       propranolol 10 MG tablet    INDERAL    120 tablet    Take 1 tablet (10 mg) by mouth 4 times daily as needed for restlessness    Psychosis, unspecified psychosis type       sertraline 100 MG tablet    ZOLOFT    30 tablet    Take 1 tablet (100 mg) by mouth daily    Depression, unspecified depression type

## 2017-10-26 DIAGNOSIS — F32.A DEPRESSION, UNSPECIFIED DEPRESSION TYPE: ICD-10-CM

## 2017-10-26 RX ORDER — SERTRALINE HYDROCHLORIDE 100 MG/1
100 TABLET, FILM COATED ORAL DAILY
Qty: 30 TABLET | Refills: 0 | Status: SHIPPED | OUTPATIENT
Start: 2017-10-26 | End: 2017-11-22

## 2017-10-26 NOTE — TELEPHONE ENCOUNTER
Medication requested: Sertraline 100 mg tabs  Last refilled: 9-26-17  Qty: 30      Last seen: 10-6-17  RTC: 6 wks  Cancel: 0  No-show: 0  Next appt: 11-3-17    Refill decision: Refilled for 30 days per protocol.      Kathleen M Doege RN

## 2017-11-03 ENCOUNTER — TELEPHONE (OUTPATIENT)
Dept: PSYCHIATRY | Facility: CLINIC | Age: 22
End: 2017-11-03

## 2017-11-03 NOTE — TELEPHONE ENCOUNTER
First Episode Psychosis Program    Incoming/Outgoing Email  [facility]ry [facility]    Correspondence with: Jasen Guillen III    Reason for contact:  Missed appt    Content:    From: Renetta Springer  Sent: Friday, November 3, 2017 11:35 AM  To: Gato Guillen; Lynnette Rios  Subject: Re: Missed appointment     No problem!  I will talk with her about rescheduling.  Are you due for a refill?  Is there anything pertinent you wanted to talk to her about to be seen sooner rather than later?    HELIO Lombardi, BURTON  First Episode Psychosis Program   [facility]  [facility], 2nd floor  [address], [address]  [place] [address]  Direct Phone: [phone]  Fax: [phone]  ____________________________________________________________________________________  From: Gato Guillen <[email or web address]>  Sent: Friday, November 3, 2017 9:47:13 AM  To: Renetta Springer  Subject: Missed appointment     Hi Renetta,  I was up late last night doing homework and I forgot about my appointment today would you let Dr. Rios I am sorry for any inconvenience I caused.  Thanks,  Gato Guillen      Will route to patient's current psychiatric provider(s) as an FYI.   Please call or EPIC message with any questions or concerns.    HELIO Lombardi LGSW  [phone]

## 2017-11-09 ENCOUNTER — ALLIED HEALTH/NURSE VISIT (OUTPATIENT)
Dept: PSYCHIATRY | Facility: CLINIC | Age: 22
End: 2017-11-09
Payer: COMMERCIAL

## 2017-11-09 DIAGNOSIS — F20.3 UNDIFFERENTIATED SCHIZOPHRENIA (H): Primary | ICD-10-CM

## 2017-11-09 DIAGNOSIS — Z79.899 ENCOUNTER FOR LONG-TERM (CURRENT) USE OF MEDICATIONS: ICD-10-CM

## 2017-11-09 PROCEDURE — 96372 THER/PROPH/DIAG INJ SC/IM: CPT | Mod: ZF

## 2017-11-09 PROCEDURE — 25000128 H RX IP 250 OP 636: Mod: ZF

## 2017-11-09 NOTE — NURSING NOTE
"Jasen Guillen III comes into clinic today at the request of Dr. Rios Ordering Provider for Invega Sustenna.    OBSERVATIONS    Prior to administration pt identified by name and :  Yes    1.  Appearance:  Casual dress, weather- appropriate. With backpack.  2.  Mood:  \"good\"  3.  Affect:  Congruent to mood  4.  Interactions:  Appropriate.  Pt reports that he has a mid-term exam tomorrow in statistics, plans to spend the rest of the day studying for this.  Pt missed last provider appt as was up late studying.  Discussed possible decrease of Invega Sustenna dose, discussed with provider at last visit.  As pt missed last appt, prefers to continue on current dose and wait until next to discuss decrease further.  Denies any imminent concerns about SE.  Writer did not observe any EPSE during meeting.  Assisted pt with scheduling for provider's earliest opening 12/15.    5.  Eye contact:  Good  6.  Side Effects:  Denies  7.  Education:  Scheduling  8.  Level of Cooperation:  Good  9.  Compliance:  Full  10.  Next appointment:  - injection, 12/15- Dr. Rios        This service provided today was under the supervising provider of the day n/a, who was available if needed.    Reason for visit: Med Injection only Invega Sustenna    Rosalie Dwyer    "

## 2017-11-09 NOTE — MR AVS SNAPSHOT
After Visit Summary   11/9/2017    Jasen Guillen III    MRN: [id number]           Patient Information     Date Of Birth          1995        Visit Information        Provider Department      11/9/2017 10:30 AM Nurse, [facility] Psychiatry Psychiatry Clinic        Today's Diagnoses     Undifferentiated schizophrenia (H)    -  1    Encounter for long-term (current) use of medications           Follow-ups after your visit        Your next 10 appointments already scheduled     Dec 07, 2017 10:30 AM CST   Nurse Visit with [facility] Psychiatry Nurse   Psychiatry Clinic ([facility])    [address] [facility] [id number] [phone]            Dec 15, 2017  8:30 AM CST   First Episode Return with Lynnette Rios MD   Psychiatry Clinic ([facility])    [address] [facility] [id number] [phone]              Who to contact     Please call your clinic at [phone] to:    Ask questions about your health    Make or cancel appointments    Discuss your medicines    Learn about your test results    Speak to your doctor   If you have compliments or concerns about an experience at your clinic, or if you wish to file a complaint, please contact [facility] Patient Relations at [phone] or email us at [email or web address]         Additional Information About Your Visit        MyChart Information     [email or web address] is an electronic gateway that provides easy, online access to your medical records. With [email or web address], you can request a clinic appointment, read your test results, renew a prescription or communicate with your care team.     To sign up for [email or web address] visit the website at [email or web address]   You will be asked to enter the access code listed below, as well as some personal information. Please follow the directions to create your username and  password.     Your access code is: [email or web address]  Expires: 2017  1:46 PM     Your access code will  in 90 days. If you need help or a new code, please contact your [facility] or call [phone] for assistance.        Care EveryWhere ID     This is your Care EveryWhere ID. This could be used by other organizations to access your [place] medical records  [id number]         Blood Pressure from Last 3 Encounters:   17 114/74   17 109/73   17 108/70    Weight from Last 3 Encounters:   17 81.9 kg (180 lb 9.6 oz)   17 83.7 kg (184 lb 9.6 oz)   17 85.5 kg (188 lb 9.6 oz)              Today, you had the following     No orders found for display       Primary Care Provider    Physician No Ref-Primary       NO REF-PRIMARY PHYSICIAN        Equal Access to Services     JENNIFER CASTILLO : Hadii aad ku hadasho Soaline, waaxda luqadaha, qaybta kaalmada adeegyada, adrian guallpa . So [facility] [phone].    ATENCIÓN: Si habla español, tiene a ventura disposición servicios gratuitos de asistencia lingüística. Llame al [phone].    We comply with applicable federal civil rights laws and [place] laws. We do not discriminate on the basis of race, color, national origin, age, disability, sex, sexual orientation, or gender identity.            Thank you!     Thank you for choosing PSYCHIATRY CLINIC  for your care. Our goal is always to provide you with excellent care. Hearing back from our patients is one way we can continue to improve our services. Please take a few minutes to complete the written survey that you may receive in the mail after your visit with us. Thank you!             Your Updated Medication List - Protect others around you: Learn how to safely use, store and throw away your medicines at www.disposemymeds.org.          This list is accurate as of: 17 11:31 AM.  Always use your most recent med list.                    Brand Name Dispense Instructions for use Diagnosis    benztropine 0.5 MG tablet    COGENTIN    30 tablet    Take 0.5 tablets (0.25 mg) by mouth 2 times daily as needed    Psychosis, unspecified psychosis type       paliperidone 234 MG/1.5ML Susp    INVEGA SUSTENNA    1.5 mL    Inject 1.5 mLs (234 mg) into the muscle every 28 days    Schizophrenia (H)       propranolol 10 MG tablet    INDERAL    120 tablet    Take 1 tablet (10 mg) by mouth 4 times daily as needed for restlessness    Psychosis, unspecified psychosis type       sertraline 100 MG tablet    ZOLOFT    30 tablet    Take 1 tablet (100 mg) by mouth daily    Depression, unspecified depression type

## 2017-11-22 ENCOUNTER — TELEPHONE (OUTPATIENT)
Dept: PSYCHIATRY | Facility: CLINIC | Age: 22
End: 2017-11-22

## 2017-11-22 DIAGNOSIS — F32.A DEPRESSION, UNSPECIFIED DEPRESSION TYPE: ICD-10-CM

## 2017-11-22 DIAGNOSIS — F29 PSYCHOSIS, UNSPECIFIED PSYCHOSIS TYPE (H): ICD-10-CM

## 2017-11-22 RX ORDER — BENZTROPINE MESYLATE 0.5 MG/1
0.5 TABLET ORAL DAILY
Qty: 30 TABLET | Refills: 0 | Status: SHIPPED | OUTPATIENT
Start: 2017-11-22 | End: 2018-02-12

## 2017-11-22 RX ORDER — SERTRALINE HYDROCHLORIDE 100 MG/1
100 TABLET, FILM COATED ORAL DAILY
Qty: 30 TABLET | Refills: 0 | Status: SHIPPED | OUTPATIENT
Start: 2017-11-22 | End: 2018-02-12

## 2017-11-22 NOTE — TELEPHONE ENCOUNTER
Last seen: 10/6/17  RTC: 6 weeks  Cancel: none  No-show: 11/3/17 (provider aware)  Next appt: 12/1/17    Rec'd copy of email below, routed by clinic SW:    From: Gato Guillen <[email or web address]>  Sent: Wednesday, November 22, 2017 1:03:30 PM  To: Renetta Springer  Subject: Re: Missed appointment     Is it possible that I could get refills of both my prescription sent over to [facility] I'm running low  ThanksGato       As provider is aware of missed appt.  Will refill a 30 d/s of medications.

## 2017-12-01 ENCOUNTER — OFFICE VISIT (OUTPATIENT)
Dept: PSYCHIATRY | Facility: CLINIC | Age: 22
End: 2017-12-01
Attending: PSYCHIATRY & NEUROLOGY
Payer: COMMERCIAL

## 2017-12-01 VITALS — HEART RATE: 80 BPM | SYSTOLIC BLOOD PRESSURE: 109 MMHG | DIASTOLIC BLOOD PRESSURE: 69 MMHG | WEIGHT: 178 LBS

## 2017-12-01 DIAGNOSIS — F32.5 MAJOR DEPRESSION IN COMPLETE REMISSION (H): ICD-10-CM

## 2017-12-01 DIAGNOSIS — F20.3 UNDIFFERENTIATED SCHIZOPHRENIA (H): Primary | ICD-10-CM

## 2017-12-01 PROCEDURE — 99212 OFFICE O/P EST SF 10 MIN: CPT | Mod: ZF

## 2017-12-01 NOTE — MR AVS SNAPSHOT
After Visit Summary   2017    Jasen Guillen III    MRN: [id number]           Patient Information     Date Of Birth          1995        Visit Information        Provider Department      2017 4:30 PM Lynnette Rios MD Psychiatry Clinic        Today's Diagnoses     Undifferentiated schizophrenia (H)    -  1    Major depression in complete remission (H)           Follow-ups after your visit        Who to contact     Please call your clinic at [phone] to:    Ask questions about your health    Make or cancel appointments    Discuss your medicines    Learn about your test results    Speak to your doctor   If you have compliments or concerns about an experience at your clinic, or if you wish to file a complaint, please contact [facility] Patient Relations at [phone] or email us at [email or web address]         Additional Information About Your Visit        MyChart Information     US HealthVestt is an electronic gateway that provides easy, online access to your medical records. With [employer], you can request a clinic appointment, read your test results, renew a prescription or communicate with your care team.     To sign up for US HealthVestt visit the website at [email or web address]   You will be asked to enter the access code listed below, as well as some personal information. Please follow the directions to create your username and password.     Your access code is: [email or web address]  Expires: 2017  1:46 PM     Your access code will  in 90 days. If you need help or a new code, please contact your [facility] Clinic or call [phone] for assistance.        Care EveryWhere ID     This is your Care EveryWhere ID. This could be used by other organizations to access your [place] medical records  [id number]        Your Vitals Were     Pulse                   80            Blood Pressure from Last 3 Encounters:    12/01/17 109/69   04/18/17 114/74   04/04/17 109/73    Weight from Last 3 Encounters:   12/01/17 80.7 kg (178 lb)   04/18/17 81.9 kg (180 lb 9.6 oz)   04/04/17 83.7 kg (184 lb 9.6 oz)              Today, you had the following     No orders found for display       Primary Care Provider Fax #    Physician No Ref-Primary [phone]       No address on file        Equal Access to Services     JENNIFER CASTILLO : Hadii aad ku hadasho Soomaali, waaxda luqadaha, qaybta kaalmada adeegyada, waxdeonte escobarin pacheco guallpa . So [facility] [phone].    ATENCIÓN: Si habla español, tiene a ventura disposición servicios gratuitos de asistencia lingüística. Llame al [phone].    We comply with applicable federal civil rights laws and [place] laws. We do not discriminate on the basis of race, color, national origin, age, disability, sex, sexual orientation, or gender identity.            Thank you!     Thank you for choosing PSYCHIATRY CLINIC  for your care. Our goal is always to provide you with excellent care. Hearing back from our patients is one way we can continue to improve our services. Please take a few minutes to complete the written survey that you may receive in the mail after your visit with us. Thank you!             Your Updated Medication List - Protect others around you: Learn how to safely use, store and throw away your medicines at www.disposemymeds.org.          This list is accurate as of: 12/1/17 11:59 PM.  Always use your most recent med list.                   Brand Name Dispense Instructions for use Diagnosis    benztropine 0.5 MG tablet    COGENTIN    30 tablet    Take 1 tablet (0.5 mg) by mouth daily    Psychosis, unspecified psychosis type       paliperidone 234 MG/1.5ML Susp    INVEGA SUSTENNA    1.5 mL    Inject 1.5 mLs (234 mg) into the muscle every 28 days    Schizophrenia (H)       propranolol 10 MG tablet    INDERAL    120 tablet    Take 1 tablet (10 mg) by mouth 4 times daily as needed for  restlessness    Psychosis, unspecified psychosis type       sertraline 100 MG tablet    ZOLOFT    30 tablet    Take 1 tablet (100 mg) by mouth daily    Depression, unspecified depression type

## 2017-12-05 ENCOUNTER — TELEPHONE (OUTPATIENT)
Dept: PSYCHIATRY | Facility: CLINIC | Age: 22
End: 2017-12-05

## 2017-12-05 NOTE — TELEPHONE ENCOUNTER
Last appt: 12/1/17    Pt is currently scheduled for Invega Sustenna injection on 12/7/17.  There was discussion of decreasing this following provider appt on 12/1/17.  Last office note is unsigned.    Sent message to Dr. Rios to confirm medication plan.

## 2017-12-07 ENCOUNTER — ALLIED HEALTH/NURSE VISIT (OUTPATIENT)
Dept: PSYCHIATRY | Facility: CLINIC | Age: 22
End: 2017-12-07
Payer: COMMERCIAL

## 2017-12-07 DIAGNOSIS — F29 PSYCHOSIS, UNSPECIFIED PSYCHOSIS TYPE (H): Primary | ICD-10-CM

## 2017-12-07 DIAGNOSIS — Z79.899 ENCOUNTER FOR LONG-TERM (CURRENT) USE OF MEDICATIONS: ICD-10-CM

## 2017-12-07 PROCEDURE — 96372 THER/PROPH/DIAG INJ SC/IM: CPT | Mod: ZF

## 2017-12-07 PROCEDURE — 25000128 H RX IP 250 OP 636: Mod: ZF

## 2017-12-07 NOTE — MR AVS SNAPSHOT
After Visit Summary   2017    Jasen Guillen III    MRN: [id number]           Patient Information     Date Of Birth          1995        Visit Information        Provider Department      2017 10:30 AM Nurse, [facility]        Today's Diagnoses     Psychosis, unspecified psychosis type    -  1    Encounter for long-term (current) use of medications           Follow-ups after your visit        Who to contact     Please call your clinic at [phone] to:    Ask questions about your health    Make or cancel appointments    Discuss your medicines    Learn about your test results    Speak to your doctor   If you have compliments or concerns about an experience at your clinic, or if you wish to file a complaint, please contact [facility] Patient Relations at [phone] or email us at [email or web address]         Additional Information About Your Visit        MyChart Information     Face to Face Livet is an electronic gateway that provides easy, online access to your medical records. With [employer], you can request a clinic appointment, read your test results, renew a prescription or communicate with your care team.     To sign up for Face to Face Livet visit the website at [email or web address]   You will be asked to enter the access code listed below, as well as some personal information. Please follow the directions to create your username and password.     Your access code is: [email or web address]  Expires: 2017  1:46 PM     Your access code will  in 90 days. If you need help or a new code, please contact your [facility] Clinic or call [phone] for assistance.        Care EveryWhere ID     This is your Care EveryWhere ID. This could be used by other organizations to access your [place] medical records  [id number]         Blood Pressure from Last 3 Encounters:   17 109/69   17 114/74   17  109/73    Weight from Last 3 Encounters:   12/01/17 80.7 kg (178 lb)   04/18/17 81.9 kg (180 lb 9.6 oz)   04/04/17 83.7 kg (184 lb 9.6 oz)              Today, you had the following     No orders found for display       Primary Care Provider Fax #    Physician No Ref-Primary [phone]       No address on file        Equal Access to Services     [facility] : Hadii vladislav ku hadolivao Sopingali, waaxda luqadaha, qaybta kaalmada adeegyada, waxdeonte gardenia nevaehcarolina sernajavan lawson laFestuscelia . So [facility] [phone].    ATENCIÓN: Si habla español, tiene a ventura disposición servicios gratuitos de asistencia lingüística. Madelyname al [phone].    We comply with applicable federal civil rights laws and [place] laws. We do not discriminate on the basis of race, color, national origin, age, disability, sex, sexual orientation, or gender identity.            Thank you!     Thank you for choosing PSYCHIATRY CLINIC  for your care. Our goal is always to provide you with excellent care. Hearing back from our patients is one way we can continue to improve our services. Please take a few minutes to complete the written survey that you may receive in the mail after your visit with us. Thank you!             Your Updated Medication List - Protect others around you: Learn how to safely use, store and throw away your medicines at www.disposemymeds.org.          This list is accurate as of: 12/7/17 11:25 AM.  Always use your most recent med list.                   Brand Name Dispense Instructions for use Diagnosis    benztropine 0.5 MG tablet    COGENTIN    30 tablet    Take 1 tablet (0.5 mg) by mouth daily    Psychosis, unspecified psychosis type       paliperidone 234 MG/1.5ML Susp    INVEGA SUSTENNA    1.5 mL    Inject 1.5 mLs (234 mg) into the muscle every 28 days    Schizophrenia (H)       propranolol 10 MG tablet    INDERAL    120 tablet    Take 1 tablet (10 mg) by mouth 4 times daily as needed for restlessness    Psychosis, unspecified psychosis  type       sertraline 100 MG tablet    ZOLOFT    30 tablet    Take 1 tablet (100 mg) by mouth daily    Depression, unspecified depression type

## 2017-12-07 NOTE — NURSING NOTE
"Jasen Guillen III comes into clinic today at the request of Dr. Rios Ordering Provider for Med Injection only Invega Sustenna.    OBSERVATIONS    Prior to administration pt identified by name and :  Yes    1.  Appearance:  Casual dress and weather appropriate.  2.  Mood:  \"good\"  3.  Affect:  Mood congruent  4.  Interactions:  Appropriate.  Pt apologizes for late arrival.  Discussed continuation of Invega Sustenna at 234 mg monthly.  Pt reports that discussion of lowering dose was not mentioned at last visit with provider.  States that he is comfortable with continuing dose at 234 mg.  Pt plans to return to [place] for winter break and will obtain next injection there.  Informed pt of next injection due date.  Pt will have [place] provider reach out to this clinic if documentation is needed.    5.  Eye contact:  Good  6.  Side Effects:  Denies  7.  Education:  None needed, call clinic PRN.  8.  Level of Cooperation:  Good  9.  Compliance:  Full  10.  Next appointment:  Next appts in [place].  Will return to clinic in /2018.    This service provided today was under the supervising provider of the day Dr. Schneider, who was available if needed.    Rosalie Dwyer    "

## 2017-12-12 NOTE — PROGRESS NOTES
"  PSYCHIATRY CLINIC PROGRESS NOTE     CC:  \"Things are good-- I can't wait for exams to be donet.\"    INTERIM HISTORY                                                   Jasen Guillen III is a 21 year old male with a diagnosis of schizophrenia (for past ~ 2 years) who comes in for a scheduled visit today.    Receiving paliperidone injections, 234 mg q month.    Has also been taking Cogentin 0.5 mg po qam for oculogyric spasms, which he has increased to 1 mg, and feels they are now resolved; also taking sertraline 100 mg po qam for depression, also now resolved.    Classes are going well- most challenging is Principles of Programming. Easiest is Hebrew.      Given his psychiatric improvement, he wonders about a possible decrease in the paliperidone injection. We agreed to wait until after exams and a fter the holidays.    PSYCH ROS:    Psychotic symptoms:  Patient denies any paranoid symptoms for the past 7 months, no ideas of reference, no hallucinations, no disorganized thinking. Anhedonia and amotivation have completely resolved. Patient no longer feels socially withdrawn.    Mood symptoms: Patient reports that he has a very stable mood, with strong interest and pleasure in his daily activities, strong ability to enjoy leisure activities, normal energy. Sleep and appetite are stable, though this past week he has had slightly more difficulty falling asleep, perhaps due to worry about exams.  No feelings of hopelessness or of self-harm. No SI.    Anxiety symptoms: Not present    Substance use: Not present.    Obsessive-compulsive symptoms: Not present    MEDICAL ROS:       Constitutional: No fever, fatigue, weight loss or weight gain.    Neurologic: Denies problems with salivation, muscle stiffness, motor restlessness. Eye spasms have resolved. Denies other abnormal motor movements.  No dizziness.    HEENT: Mild dry mouth. No dry eyes.    Cardiovascular: No chest pain or palpitations.    Respiratory: No shortness " "of breath, dyspnea on exertion, or wheezing.    Gastrointestinal: No nausea or constipation     : No symptoms reported    Musculoskeletal:No musculoskeletal difficulties.    PSYCHOSOCIAL HX:    Continues to participate actively in [employer] Adults group.  Taking 3 courses this semester and doing well so far.  Living a dorm with a roommate which he finds enjoyable.  Very active in [employer] student group on campus which is where he does most of his socializing leisure activities. Going to [employer] tonight.    PAST PSYCH HISTORY:    Received intensive treatment 2 years ago for an enduring episode of psychosis that was characterized predominantly by ideas of reference (feeling that he was being talked about insolvency would listen to over the Internet) he also experienced disorganized thinking and disorganized behavior.    Of note, this patient's past and current presentation is characterized by the presence of mild chronic dyskinetic movements.    ALLERGY                                Review of patient's allergies indicates no known allergies.  MEDICATIONS                               Paliperidone 234 IM q 28 days  Cogentin 0.5 mg po qamSertraline 100 mg po qd     VITALS he is he      MENTAL STATUS EXAM                                                             Orientation:  x4  Gait and Station:  Walks smoothly in squires, normal arm swing, normal gait.  No UE stifness.  Mild fleeting dyskinetic movements observable in face and hands. No masked facies.  Alertness: Alert, oriented.  Appearance: Neatly and casually dressed, carrying backpack.  Behavior/Demeanor: Calm, cooperative, pleasant, with excellent eye contact.  Appropriately interactive.  Speech: Normal rate and rhythm, normal volume.  Language: Language is cogent, descriptive of his response to treatment. D  Psychomotor: Normal PM speed. No slowing observed.   Mood: \"very good.\"  Affect:Full range, expressive, appropriate to content.  Thought " Process/Associations: Linear, logical.  Thought Content: Denies delusional thoughts, ideas of reference, SI. HI.  Focused on his upcoming midterms.  Perception:  Denies hallucinations. No illusions.  Insight: Excellent.  Judgment: Excellent  Cognition:  Intact  Recent and Remote Memory: Intact  Attention Span and Concentration:  Intact  Fund of Knowledge:excellent        DIAGNOSIS     Schizophrenia, in remission  Major depression, in remission  Extrapyramidal side effects (oculogyrc spasms).     ASSESSMENT                                     Schizophrenia, acute phase well resolved, though with some ongoing mild dyskinesia observed in extremities-- likely due to underlying illness.  I wonder about the particular subtype of this patient's psychotic illness, and possibly an underlying inflammatory picture, given the picture of the dyskinesias, need for a fairly high dose of antipsychotic medication, at least initially, and his overall response pattern which has been quite excellent once intensive multimodal treatment was undertaken.    In addition, the patient had experienced new onset of major depressive symptoms in the spring. These have responded well to sertraline 100 mg by mouth daily, and patient has been showing better mood and more full affect than I have seen him previously.    Also of note, his prior negative symptoms of lack of motivation, lack of energy, and anhedonia, now are completely resolved. The patient reports that he is experiencing strong motivation and enjoyment, and is reaching out to connect emotionally with others which has had a good benefit on his overall functioning. For this patient, his daryn and his engagement with the [employer] youth group have been particularly helpful.     PLAN                                                                                                       1) PSYCHOTROPIC MEDICATIONS:   Continue iInjectable paliperidone 234 mg po q month, with Cogentin 0.5 mg po q  "am.  We have decided not to decrease the dos today, given the exams and upcoming holidays, but will do so at next visit. We would hope to be able to decrease the Cogentin as well, but this will depend on the nature and severity of any dyskinetic movements. Of course, there is a risk that dyskinetic movements may increase when we reduce the paliperidone, but our hope is that eventually these might resolve intensity as well.     Continue sertraline 100 mg po qd.     We should strongly consider initiating NAC and omega-3 essential fatty acids at next visit       2) THERAPY:      Will continue Young Adults group once per month.    Interested in individual psychotherapy-- will refer to Howard Mcmahon, PhD for evaluation for individual sessions.      3) RTC: Next week for injection and in 6 weeks to see me.    4) CRISIS NUMBERS:   Provided routinely in AVS        TREATMENT RISK STATEMENT:  The risks, benefits, alternatives and potential adverse effects have been discussed and are understood by the patient/ patient's guardian. The pt understands the risks of using street drugs or alcohol.  There are no medical contraindications, the pt agrees to treatment with the ability to do so.  The patient understands to call 911 or come to the nearest ED if life threatening or urgent symptoms present.    PSYCHIATRY CLINIC INDIVIDUAL PSYCHOTHERAPY NOTE   Length of session: 20 minutes  Date of most recent treatment plan:today      Subjective: \"I am doing really well.\"   Plan:    Continue supportive psychotherapy during monthly medication management visits.   Continue [employer] Adults Group    Psychotherapy services during this visit included  myself and Jasen Guillen.  Issues addressed in therapy included: how to focus on enjoying his coursework and also staying involved in leisure and daryn-based activities  and were addressed using primarily psychodynamic, supportive and CBT techniques.        TREATMENT  PLAN          SYMPTOMS;PROBLEMS   " MEASURABLE GOALS;    FUNCTIONAL IMPROVEMENT INTERVENTIONS;    GAINS MADE DISCHARGE CRITERIA   Low energy, lack of motivation Patient will experience a significant improvement in energy and motivation, and will be able to engage in outsidesocializing activities as well as classwork Supportive Psychotherapy once per month    Individual psychotherapy, once per week Ongoing engagement with social activities   Ideas of reference during times of stress Patient will no longer experience ideas of reference Monthly injectable paliperidone  IOR have resolved at present  Two years without symptoms, followed by two years of monitoring without symptoms relapse   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

## 2017-12-27 ENCOUNTER — TELEPHONE (OUTPATIENT)
Dept: PSYCHIATRY | Facility: CLINIC | Age: 22
End: 2017-12-27

## 2017-12-27 NOTE — TELEPHONE ENCOUNTER
----- Message from Dorota Ferraro sent at 12/27/2017 10:22 AM CST -----  Regarding: Pt's dad  Contact: [phone]  Jasen, pt's dad would like to know when is Jasen's next Injection.    Thanks!

## 2017-12-27 NOTE — TELEPHONE ENCOUNTER
Last appt: 12/1/17    Last Invega Sustenna 234 mg IM injection:  12/7/17 (pt due on 1/4/18)    There is a invalid consent to discuss on file for pt's dad, signed 1/23/17.    LVM for pt with message regarding last injection date and next injection due date.

## 2017-12-29 NOTE — TELEPHONE ENCOUNTER
Nelda Alexandra RN Bove, Michelle, RN        Phone Number: [phone]                       Previous Messages       ----- Message -----      From: Avelino Red      Sent: 12/29/2017   4:28 PM        To: Nelda Alexandra RN   Subject: Pt dad with question                             Hello, this patient's father called and wants to make sure that everything is still the same as far as dosage for medication, 234mg. He said he was told to talk with you when Jewels is out.       OK to leave detailed voicemail message          Writer requested that scheduling c/b and pass along that writer left this information on pt's voicemail yesterday.

## 2018-01-02 NOTE — TELEPHONE ENCOUNTER
Nelda Alexandra RN Bove, Michelle, RN        Phone Number: [phone]                       Previous Messages       ----- Message -----      From: Avelino Red MAKI      Sent: 12/29/2017   4:28 PM        To: Nelda Alexandra RN   Subject: Pt dad with question                             Hello, this patient's father called and wants to make sure that everything is still the same as far as dosage for medication, 234mg. He said he was told to talk with you when Jewels is out.       OK to leave detailed voicemail message            Sarah Long Michelle, CL        Phone Number: [phone]                     Bill, pt's dad is the caller. He had some questions for you about medications. Please follow up when you can. Ok to leave detailed message.              [facility]  that [facility] is no updated consent on file for pt's dad.   asked dad to have pt contact clinic directly. ROIs are being submitted     Writer contacted pt to discuss.  Passed along the last injection due date and plan to continue on current dose until pt returns to see Dr. Rios.  Pt is agreeable to this.  Communicates information to his father in the background.  Denies any further questions.

## 2018-01-04 ENCOUNTER — TELEPHONE (OUTPATIENT)
Dept: PSYCHIATRY | Facility: CLINIC | Age: 23
End: 2018-01-04

## 2018-01-04 NOTE — TELEPHONE ENCOUNTER
"On 1/2/2018 the patient signed an JONNY authorizing the release of records to/from [facility] to/from Jasen Guillen/patient's father.  A post it on the document reads \"for file only - do not send -Jewels\" so this writer sent the document directly to scanning for possible future use and held a copy in Psychiatry until scanning is complete/confirmed.  Sita Tyler/JOVITA 1/4/2018    "

## 2018-01-04 NOTE — TELEPHONE ENCOUNTER
On 1/2/2018 the patient signed an JONNY authorizing the release of recrods from [facility] to [facility] (Psychiatry).  I sent the document to medical records - via the tube system - on 1/4/2018.Sita Tyler/JOVITA

## 2018-01-04 NOTE — TELEPHONE ENCOUNTER
On 1/2/2018 the patient signed an Authorization for Electronic Communication.  I sent the document to scanning and held a copy in Psychiatry until scanning complete/confirmed.Sita Tyler/JOVITA

## 2018-01-08 ENCOUNTER — TELEPHONE (OUTPATIENT)
Dept: PSYCHIATRY | Facility: CLINIC | Age: 23
End: 2018-01-08

## 2018-01-08 NOTE — TELEPHONE ENCOUNTER
Signed JONNY/Consents recently rec'd and processed by rooming staff.  Pt's likely requesting information regarding email below:    From: Gato Guillen <[email or web address]>  Sent: Tuesday, January 2, 2018 10:44:15 AM  To: Renetta Springer; [email or web address]  Cc: Uvaldo Guillen  Subject: Re: Scheduling psych f/u     Thanks very much Renetta.  Also I just spoke with Dr. Rich s office, and they need documentation of the following lab work performed in the past 6 months:     Blood sugar  Lipid panel/A1C     Can you please forward this information to Dr. Rich s office today or tomorrow?  (I ve copied Gato MAURICIO and Felipe Chisholm of Dr. Rich s office on this email.)     Thank you very much!     Gato     Per review of EMR, there have not been any labs resulted at this facility.  Last lab results rec'd from [facility], resulted on 6/9/17.    Spoke with pt who states that he will plan to have labs drawn and [facility], per their protocol for twice yearly lab monitoring.

## 2018-01-08 NOTE — TELEPHONE ENCOUNTER
----- Message from Traci Madrid sent at 1/8/2018 12:18 PM CST -----  Regarding: Pt needs C/B ASAP  Contact: [phone]  Pt needs paperwork updated to allow pt's father and pt's [place] provider to speak with the office and they need an answer within the next few hours so he can have an appt (injection) at 4:30pm in [place].    [phone]; yes ok to lvm    Response needs to go to the provider about the lab work recently.

## 2018-02-09 ENCOUNTER — OFFICE VISIT (OUTPATIENT)
Dept: PSYCHIATRY | Facility: CLINIC | Age: 23
End: 2018-02-09
Attending: PSYCHIATRY & NEUROLOGY
Payer: COMMERCIAL

## 2018-02-09 ENCOUNTER — TELEPHONE (OUTPATIENT)
Dept: PSYCHIATRY | Facility: CLINIC | Age: 23
End: 2018-02-09

## 2018-02-09 VITALS — DIASTOLIC BLOOD PRESSURE: 64 MMHG | WEIGHT: 181.8 LBS | HEART RATE: 72 BPM | SYSTOLIC BLOOD PRESSURE: 96 MMHG

## 2018-02-09 DIAGNOSIS — F20.9 SCHIZOPHRENIA, UNSPECIFIED TYPE (H): Primary | ICD-10-CM

## 2018-02-09 DIAGNOSIS — F20.3 UNDIFFERENTIATED SCHIZOPHRENIA (H): Primary | ICD-10-CM

## 2018-02-09 DIAGNOSIS — F29 PSYCHOSIS, UNSPECIFIED PSYCHOSIS TYPE (H): ICD-10-CM

## 2018-02-09 DIAGNOSIS — F32.A DEPRESSION, UNSPECIFIED DEPRESSION TYPE: ICD-10-CM

## 2018-02-09 DIAGNOSIS — F33.1 MODERATE EPISODE OF RECURRENT MAJOR DEPRESSIVE DISORDER (H): ICD-10-CM

## 2018-02-09 DIAGNOSIS — Z79.899 ENCOUNTER FOR LONG-TERM (CURRENT) USE OF MEDICATIONS: ICD-10-CM

## 2018-02-09 PROCEDURE — 96372 THER/PROPH/DIAG INJ SC/IM: CPT | Mod: ZF

## 2018-02-09 PROCEDURE — 25000128 H RX IP 250 OP 636: Mod: ZF

## 2018-02-09 PROCEDURE — G0463 HOSPITAL OUTPT CLINIC VISIT: HCPCS | Mod: ZF

## 2018-02-09 ASSESSMENT — PAIN SCALES - GENERAL: PAINLEVEL: NO PAIN (0)

## 2018-02-09 NOTE — TELEPHONE ENCOUNTER
Dr. Rios confirm Invega Sustenna dosing to remain at 234 mg IM every 4 weeks.    Injection scheduled for today.    Also rec'd TO to increase sertraline to 150 mg daily.  Prescription needed.  Pt not taking Cogentin or propranolol so does not need refills.    Writer to contact pt on 2/12/18 to check-in.  If pt's sx are not improved, will schedule with Dr. Rios on 2/16/18.  If improved, pt can follow up in 4 weeks.

## 2018-02-09 NOTE — NURSING NOTE
Chief Complaint   Patient presents with     Recheck Medication     Undifferentiated schizophrenia     Reviewed Allergies, Medications, Pharmacy, Smoking Status, and Pain Level  Administered Abuse Screening Questions   Obtained Weight, Blood Pressure, Heart Rate x2

## 2018-02-09 NOTE — MR AVS SNAPSHOT
After Visit Summary   2018    Jasen Guillen III    MRN: [id number]           Patient Information     Date Of Birth          1995        Visit Information        Provider Department      2018 10:15 AM Lynnette Rios MD Psychiatry Clinic        Today's Diagnoses     Undifferentiated schizophrenia (H)    -  1    Moderate episode of recurrent major depressive disorder (H)           Follow-ups after your visit        Your next 10 appointments already scheduled     Mar 02, 2018  9:15 AM CST   First Episode Return with Lynnette Rios MD   Psychiatry Clinic ([facility])    [address] [facility] [id number] [phone]            Mar 02, 2018  9:45 AM CST   Nurse Visit with [facility] Psychiatry Nurse   Psychiatry Clinic ([facility])    [place] [id number] [facility] [id number] [phone]              Who to contact     Please call your clinic at [phone] to:    Ask questions about your health    Make or cancel appointments    Discuss your medicines    Learn about your test results    Speak to your doctor            Additional Information About Your Visit        MyChart Information     TopBlip is an electronic gateway that provides easy, online access to your medical records. With TopBlip, you can request a clinic appointment, read your test results, renew a prescription or communicate with your care team.     To sign up for Dat[employer] visit the website at [email or web address]   You will be asked to enter the access code listed below, as well as some personal information. Please follow the directions to create your username and password.     Your access code is: [email or web address]  Expires: 2018  5:41 PM     Your access code will  in 90 days. If you need help or a new code, please contact your [facility] or call [phone] for  assistance.        Care EveryWhere ID     This is your Care EveryWhere ID. This could be used by other organizations to access your [place] medical records  [id number]        Your Vitals Were     Pulse                   72            Blood Pressure from Last 3 Encounters:   02/09/18 96/64   12/01/17 109/69   04/18/17 114/74    Weight from Last 3 Encounters:   02/09/18 82.5 kg (181 lb 12.8 oz)   12/01/17 80.7 kg (178 lb)   04/18/17 81.9 kg (180 lb 9.6 oz)              Today, you had the following     No orders found for display         Today's Medication Changes          These changes are accurate as of 2/9/18 11:59 PM.  If you have any questions, ask your nurse or doctor.               These medicines have changed or have updated prescriptions.        Dose/Directions    sertraline 100 MG tablet   Commonly known as:  ZOLOFT   This may have changed:  how much to take   Used for:  Depression, unspecified depression type   Changed by:  Rosalie Dwyer, RN        Dose:  150 mg   Take 1.5 tablets (150 mg) by mouth daily   Quantity:  45 tablet   Refills:  0            Where to get your medicines      These medications were sent to [place], [place] - [address] [place] [address]     Phone:  [phone]     sertraline 100 MG tablet                Primary Care Provider Fax #    Physician No Ref-Primary [phone]       No address on file        Equal Access to Services     JENNIFER CASTILLO AH: Giovany Ruby, waaxda lujoan, qaybta kaalradha russell, adrian castaneda. So [facility] [phone].    ATENCIÓN: Si habla español, tiene a ventura disposición servicios gratuitos de asistencia lingüística. Llame al [phone].    We comply with applicable federal civil rights laws and [place] laws. We do not discriminate on the basis of race, color, national origin, age, disability, sex, sexual orientation, or gender identity.            Thank  you!     Thank you for choosing PSYCHIATRY CLINIC  for your care. Our goal is always to provide you with excellent care. Hearing back from our patients is one way we can continue to improve our services. Please take a few minutes to complete the written survey that you may receive in the mail after your visit with us. Thank you!             Your Updated Medication List - Protect others around you: Learn how to safely use, store and throw away your medicines at www.disposemymeds.org.          This list is accurate as of 2/9/18 11:59 PM.  Always use your most recent med list.                   Brand Name Dispense Instructions for use Diagnosis    paliperidone 234 MG/1.5ML Susp    INVEGA SUSTENNA    1.5 mL    Inject 1.5 mLs (234 mg) into the muscle every 28 days    Schizophrenia (H)       sertraline 100 MG tablet    ZOLOFT    45 tablet    Take 1.5 tablets (150 mg) by mouth daily    Depression, unspecified depression type

## 2018-02-09 NOTE — NURSING NOTE
"Jasen Guillen III comes into clinic today at the request of Dr. Rios Ordering Provider for Med Injection only Kelsey Rodriguez.      Prior to administration pt identified by name and :  Yes    1.  Appearance:  Casual dress and weather appropriate.  2.  Mood:  \"ok\"  3.  Affect:  Somewhat restricted  4.  Interactions:  Appropriate. Discussed pt's call to clinic intake staff yesterday, apologized that this message was not relayed timely.  Encouraged pt to utilize main clinic number to discuss questions/concerns.  Discussed use of after hours or on-call line.  Phone numbers provided at check-out.  Discussed med plan per Dr. Rios.  5.  Eye contact:  Good  6.  Side Effects:  Denies  7.  Education:  See above  8.  Level of Cooperation:  Good  9.  Compliance:  Full  10.  Next appointment:  3/9/18- pt requested both appt and injection on this date      This service provided today was under the supervising provider of the day n/a, who was available if needed.    Rosalie Dwyer    "

## 2018-02-09 NOTE — TELEPHONE ENCOUNTER
----- Message from Sarah Long sent at 2/9/2018 10:28 AM CST -----  Regarding: pt having delusions/Vinogradov  Contact: [phone]  Pt left a voice message on my phone yesterday. He has been having a lot of trouble sleeping. As he's struggling with sleep, his delusions have been returning and they are very severe. He mentioned in the message, he feels stable and calm, but he is looking for help and solutions to help him recover. Please follow up when you can. Ok to leave detailed message.

## 2018-02-09 NOTE — TELEPHONE ENCOUNTER
Sarah Long Michelle, CL Wiggins,     I just saw that he is currently in an appointment with Dr Rios, so I'm not sure if you will need to follow up with him at all.       Pt arrived to clinic at 10:09 am and is currently meeting with provider.  Pt scheduled for an injection following this visit.  Have already sent a message to Dr. Rios to confirm injection dose following appt.    Awaiting response from Dr. Rios

## 2018-02-09 NOTE — MR AVS SNAPSHOT
After Visit Summary   2018    Jasen Guillen III    MRN: [id number]           Patient Information     Date Of Birth          1995        Visit Information        Provider Department      2018 10:45 AM Nurse, [facility] Psychiatry Psychiatry [facility]        Today's Diagnoses     Schizophrenia, unspecified type (H)    -  1    Encounter for long-term (current) use of medications           Follow-ups after your visit        Your next 10 appointments already scheduled     Mar 02, 2018  9:15 AM CST   First Episode Return with Lynnette Rios MD   Psychiatry Clinic ([facility])    [address] [facility] [id number] [phone]            Mar 02, 2018  9:45 AM CST   Nurse Visit with [facility] Psychiatry Nurse   Psychiatry Clinic ([facility])    [place] [id number] [facility] [id number] [phone]              Who to contact     Please call your clinic at [phone] to:    Ask questions about your health    Make or cancel appointments    Discuss your medicines    Learn about your test results    Speak to your doctor            Additional Information About Your Visit        MyChart Information     [employer] is an electronic gateway that provides easy, online access to your medical records. With [employer], you can request a clinic appointment, read your test results, renew a prescription or communicate with your care team.     To sign up for Spark The Firet visit the website at [email or web address]   You will be asked to enter the access code listed below, as well as some personal information. Please follow the directions to create your username and password.     Your access code is: [email or web address]  Expires: 2018  5:41 PM     Your access code will  in 90 days. If you need help or a new code, please contact your [facility] or call [phone] for  assistance.        Care EveryWhere ID     This is your Care EveryWhere ID. This could be used by other organizations to access your [place] medical records  [id number]         Blood Pressure from Last 3 Encounters:   02/09/18 96/64   12/01/17 109/69   04/18/17 114/74    Weight from Last 3 Encounters:   02/09/18 82.5 kg (181 lb 12.8 oz)   12/01/17 80.7 kg (178 lb)   04/18/17 81.9 kg (180 lb 9.6 oz)              Today, you had the following     No orders found for display       Primary Care Provider Fax #    Physician No Ref-Primary [phone]       No address on file        Equal Access to Services     JENNIFER CASTILLO : Giovany Ruby, apolinar bazzi, shelly russell, adrian guallpa . So [facility] [phone].    ATENCIÓN: Si habla español, tiene a ventura disposición servicios gratuitos de asistencia lingüística. Llame al [phone].    We comply with applicable federal civil rights laws and [place] laws. We do not discriminate on the basis of race, color, national origin, age, disability, sex, sexual orientation, or gender identity.            Thank you!     Thank you for choosing PSYCHIATRY CLINIC  for your care. Our goal is always to provide you with excellent care. Hearing back from our patients is one way we can continue to improve our services. Please take a few minutes to complete the written survey that you may receive in the mail after your visit with us. Thank you!             Your Updated Medication List - Protect others around you: Learn how to safely use, store and throw away your medicines at www.disposemymeds.org.          This list is accurate as of 2/9/18 11:49 AM.  Always use your most recent med list.                   Brand Name Dispense Instructions for use Diagnosis    benztropine 0.5 MG tablet    COGENTIN    30 tablet    Take 1 tablet (0.5 mg) by mouth daily    Psychosis, unspecified psychosis type       paliperidone 234 MG/1.5ML Susp    INVEGA  SUSTENNA    1.5 mL    Inject 1.5 mLs (234 mg) into the muscle every 28 days    Schizophrenia (H)       propranolol 10 MG tablet    INDERAL    120 tablet    Take 1 tablet (10 mg) by mouth 4 times daily as needed for restlessness    Psychosis, unspecified psychosis type       sertraline 100 MG tablet    ZOLOFT    30 tablet    Take 1 tablet (100 mg) by mouth daily    Depression, unspecified depression type

## 2018-02-12 ENCOUNTER — CARE COORDINATION (OUTPATIENT)
Dept: PSYCHIATRY | Facility: CLINIC | Age: 23
End: 2018-02-12

## 2018-02-12 RX ORDER — SERTRALINE HYDROCHLORIDE 100 MG/1
150 TABLET, FILM COATED ORAL DAILY
Qty: 45 TABLET | Refills: 0 | Status: SHIPPED | OUTPATIENT
Start: 2018-02-12 | End: 2018-03-30

## 2018-02-12 NOTE — PROGRESS NOTES
Last appt: 2/9/18    Per request of Dr. Rios, outreach call to pt to check-in regarding meds/symptoms.  See telephone note dated 2/9/18.    LVM for pt to return call to clinic.  Requested that if he does not reach writer on call back, that he leaves his availability.

## 2018-02-13 NOTE — PROGRESS NOTES
"Writer reached pt.  Discussed reason for call.  Pt states that he is \"feeling better\" yet believes that it is too soon to determine efficacy from med changes. Writer requested an update on sleep and \"delusions\" mentioned in previous notes.  Unable to refer to last office note as is not complete at this time.  Unfortunately, phone drops before pt responds.    Writer left pt a detailed VM requesting an update on sx mentioned above.  If no call back, will reach out tomorrow.    Msg sent to Dr. Rios as YVONNE  "

## 2018-02-14 NOTE — PROGRESS NOTES
LVM for pt with request to return call to clinic to discuss as previous call was cut short.  Reminded of upcoming appt date/time.

## 2018-02-15 NOTE — PROGRESS NOTES
"  PSYCHIATRY CLINIC PROGRESS NOTE     CC:  \"I have been having more symptoms over the last few weeks.\"    INTERIM HISTORY                                                   Jasen Guillen III is a 21 year old male with a diagnosis of schizophrenia (for past ~ 2 years) who comes in for visit today.    Receiving paliperidone injections, 234 mg q month.    Has also been taking Cogentin 0.5 mg po qam for oculogyric spasms, which he  increased to 1 mg, but recently stopped, as the muscle spasms of been resolved over the past month.     Patient is also taking sertraline 100 mg po qam for depression, which has responded well since all.    He is back in classes over the last few weeks break.  Is finding classes somewhat stressful especially as he prepares for upcoming midterms.      PSYCH ROS:    Positive Psychotic symptoms:      Over the last several weeks, she notices that his symptoms of ideas of reference are returning.  He feels that he overhears things, or sees signs around him, indicating that there is some special meaning or connection among, even though at some level he realizes that these incidents are random.  He is unable to give specific details.  Does not report any feelings of paranoia or being in danger.  Denies hallucinations or disorganized thinking.     Negative psychotic symptoms:    Anhedonia and a motivation remain mostly resolved.  Patient has not been particularly socially withdrawn, and still engaging in activities with friends.      Mood symptoms:     It has been generally stable, although patient does note some increase in anxiety symptoms over the past several weeks.  Continues to enjoy leisure activities.  Energy level normal.  Sleep somewhat impaired, especially difficulty falling asleep.  No feelings of hopelessness or of self-harm. No SI.    Cognition:    Patient reports difficulties concentrating.    Substance use: Not present.    Obsessive-compulsive symptoms: Not present    MEDICAL ROS:  "      Constitutional: No fever, fatigue.  Has had some weight loss due to eating less.    Neurologic: Denies problems with salivation, muscle stiffness, motor restlessness. Eye spasms have resolved. Denies other abnormal motor movements.  No dizziness.    HEENT: Mild dry mouth. No dry eyes.    Cardiovascular: No chest pain or palpitations.    Respiratory: No shortness of breath, dyspnea on exertion, or wheezing.    Gastrointestinal: No nausea or constipation     : No symptoms reported    Musculoskeletal:No musculoskeletal difficulties.    PSYCHOSOCIAL HX:    Had a good visit home with family over the holidays.  Taking 3 courses this semester.  Living a dorm with a roommate which he finds enjoyable.      PAST PSYCH HISTORY:    Received intensive treatment 2 years ago for an enduring episode of psychosis that was characterized predominantly by ideas of reference (feeling that he was being talked about insolvency would listen to over the Internet) he also experienced disorganized thinking and disorganized behavior.    Of note, this patient's past and current presentation is characterized by the presence of mild chronic dyskinetic movements.    ALLERGY                                Review of patient's allergies indicates no known allergies.  MEDICATIONS                               Paliperidone 234 IM q 28 days  Sertraline 100 mg po qd     VITALS     BP 96/64  Pulse 72  Wt 82.5 kg (181 lb 12.8 oz) and then shortness  MENTAL STATUS EXAM                                                             Orientation:  x4  Gait and Station:  Walks smoothly in squires, normal arm swing, normal gait.  No UE stifness.  Mild fleeting dyskinetic movements observable in face and hands. No masked facies.  Alertness: Alert, oriented.  Appearance: Neatly and casually dressed, carrying backpack.  Behavior/Demeanor: Calm, cooperative, pleasant, with excellent eye contact.  Appropriately interactive.  Speech: Normal rate and rhythm, normal  "volume.  Language: Language is cogent, descriptive of his response to treatment. D  Psychomotor: Normal PM speed. No slowing observed.   Mood: \"very good.\"  Affect:Full range, expressive, appropriate to content.  Thought Process/Associations: Linear, logical.  Thought Content: Denies delusional thoughts, ideas of reference, SI. HI.  Focused on his upcoming midterms.  Perception:  Denies hallucinations. No illusions.  Insight: Excellent.  Judgment: Excellent  Cognition:  Intact  Recent and Remote Memory: Intact  Attention Span and Concentration:  Intact during exam-- patient notes some difficulties here, elyse with school work.  Fund of Knowledge:excellent        DIAGNOSIS     Schizophrenia, slight exacerbation of IOR sx, possibly due to increased stress and being due for next injection.  Major depression, in remission, but possibly with increased anxiety sx breaking through.  Extrapyramidal side effects (oculogyrc spasms), resolved spontanesouly, no longer on Cogentin.     ASSESSMENT                                     Schizophrenia, acute phase generally well resolved, though with some new recurrence of positive sx of  IOR-- likely due to stress of returning to classes and increased anxiety.  Although we will hold discussed back in December the possibility of decreasing his paliperidone injection, given the recent flareup in symptoms, we will not make any changes in his antipsychotic medication at this point.  I anticipate that he will stabilize very quickly with the injection he receives today and overall psychosocial support from the clinic.     In terms of the depressive symptoms, the patient patient had experienced new onset of major depressive symptoms in the spring. These generally responded well to sertraline 100 mg by mouth daily, but it is possible that the increased anxiety present this week will be related to some breakthrough symptoms.    Also of note, his prior negative symptoms of lack of motivation, " "lack of energy, and anhedonia, now are resolved. The patient reports that he is experiencing strong motivation and enjoyment.    Cognitive symptoms: The scar is slightly improved subjective self-report, the patient recently participated in cognitive training study.  However concentration is still noted to be a problem         PLAN                                                                                                       1) Positive Psychotic Sx:   Continue iInjectable paliperidone 234 mg po q month, injection due today.  We have decided not to decrease the dose today, given the increase in ideas of reference over the last several weeks.              No longer requiring Cogentin for EPS.              Will also consider initiating NAC and omega-3 essential fatty acids at next visit    2) Negative psychotic symptoms:     Continue to support involvement in social activities on campus.  Patient also participated in young adults group.    3) Mood and anxiety symptoms:   Increase sertraline to 150 mg po qd.    4)  Three weeks.    5)  Our nurse, Rosalie Dwyer, will reach out to patient early next week to verify that sx are resolving.            TREATMENT RISK STATEMENT:  The risks, benefits, alternatives and potential adverse effects have been discussed and are understood by the patient/ patient's guardian. The pt understands the risks of using street drugs or alcohol.  There are no medical contraindications, the pt agrees to treatment with the ability to do so.  The patient understands to call 911 or come to the nearest ED if life threatening or urgent symptoms present.    PSYCHIATRY CLINIC INDIVIDUAL PSYCHOTHERAPY NOTE   Length of session: 20 minutes, 10:25 to 10:45  Date of most recent treatment plan:today      Subjective: \"I have had some sx flare up.\"   Plan:    Continue supportive psychotherapy during monthly medication management visits.   Continue Young Adults Group    Psychotherapy services during this " visit included  myself and Jasen Guillen.  Issues addressed in therapy included: how to focus on enjoying his coursework and also staying involved in leisure and daryn-based activities  and were addressed using primarily psychodynamic, supportive and CBT techniques.        TREATMENT  PLAN          SYMPTOMS;PROBLEMS   MEASURABLE GOALS;    FUNCTIONAL IMPROVEMENT INTERVENTIONS;    GAINS MADE DISCHARGE CRITERIA   Low energy, lack of motivation Patient will experience a significant improvement in energy and motivation, and will be able to engage in outsidesocializing activities as well as classwork Supportive Psychotherapy once per month    Individual psychotherapy, once per week Ongoing engagement with social activities   Ideas of reference during times of stress Patient will no longer experience ideas of reference Monthly injectable paliperidone  IOR have resolved at present  Two years without symptoms, followed by two years of monitoring without symptoms relapse   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

## 2018-02-16 NOTE — TELEPHONE ENCOUNTER
Rec'd message from Dr. Rios that pt is to continue on Invega Sustenna 234 mg IM Q 4 weeks.  Injection scheduled for tomorrow.  It was also requested to check in with pt regarding depression sx at last visit where sertraline was started.       no

## 2018-03-02 ENCOUNTER — ALLIED HEALTH/NURSE VISIT (OUTPATIENT)
Dept: PSYCHIATRY | Facility: CLINIC | Age: 23
End: 2018-03-02
Attending: PSYCHIATRY & NEUROLOGY
Payer: COMMERCIAL

## 2018-03-02 ENCOUNTER — CARE COORDINATION (OUTPATIENT)
Dept: PSYCHIATRY | Facility: CLINIC | Age: 23
End: 2018-03-02

## 2018-03-02 VITALS — DIASTOLIC BLOOD PRESSURE: 67 MMHG | SYSTOLIC BLOOD PRESSURE: 104 MMHG | OXYGEN SATURATION: 99 % | HEART RATE: 75 BPM

## 2018-03-02 DIAGNOSIS — Z79.899 ENCOUNTER FOR LONG-TERM (CURRENT) USE OF MEDICATIONS: ICD-10-CM

## 2018-03-02 DIAGNOSIS — F20.9 SCHIZOPHRENIA, UNSPECIFIED TYPE (H): Primary | ICD-10-CM

## 2018-03-02 PROCEDURE — 96372 THER/PROPH/DIAG INJ SC/IM: CPT | Mod: ZF

## 2018-03-02 NOTE — MR AVS SNAPSHOT
After Visit Summary   3/2/2018    Jasen Guillen III    MRN: [id number]           Patient Information     Date Of Birth          1995        Visit Information        Provider Department      3/2/2018 1:30 PM Nurse, [facility] Psychiatry Psychiatry Clinic        Today's Diagnoses     Schizophrenia, unspecified type (H)    -  1    Encounter for long-term (current) use of medications           Follow-ups after your visit        Your next 10 appointments already scheduled     Mar 23, 2018  8:45 AM CDT   First Episode Return with Lynnette Rios MD   Psychiatry Clinic ([facility])    [place] [id number]  [facility] [address] [id number] [phone]            Mar 30, 2018  1:30 PM CDT   Nurse Visit with [facility] Psychiatry Nurse   Psychiatry Clinic ([facility])    [place] [id number]  [facility] [address] [address] [phone]              Who to contact     Please call your clinic at [phone] to:    Ask questions about your health    Make or cancel appointments    Discuss your medicines    Learn about your test results    Speak to your doctor            Additional Information About Your Visit        MyChart Information     Atlantium is an electronic gateway that provides easy, online access to your medical records. With Atlantium, you can request a clinic appointment, read your test results, renew a prescription or communicate with your care team.     To sign up for [email or web address] visit the website at [email or web address]   You will be asked to enter the access code listed below, as well as some personal information. Please follow the directions to create your username and password.     Your access code is: [email or web address]  Expires: 2018  5:41 PM     Your access code will  in 90 days. If you need help or a new code, please contact your [facility] or call [phone] for  assistance.        Care EveryWhere ID     This is your Care EveryWhere ID. This could be used by other organizations to access your [place] medical records  [id number]         Blood Pressure from Last 3 Encounters:   02/09/18 96/64   12/01/17 109/69   04/18/17 114/74    Weight from Last 3 Encounters:   02/09/18 82.5 kg (181 lb 12.8 oz)   12/01/17 80.7 kg (178 lb)   04/18/17 81.9 kg (180 lb 9.6 oz)              Today, you had the following     No orders found for display       Primary Care Provider Fax #    Physician No Ref-Primary [phone]       No address on file        Equal Access to Services     JENNIFER CASTILLO : Giovany Ruby, apolinar bazzi, shelly russell, adrian castaneda. So [facility] [phone].    ATENCIÓN: Si habla español, tiene a ventura disposición servicios gratuitos de asistencia lingüística. Llame al [phone].    We comply with applicable federal civil rights laws and [place] laws. We do not discriminate on the basis of race, color, national origin, age, disability, sex, sexual orientation, or gender identity.            Thank you!     Thank you for choosing PSYCHIATRY CLINIC  for your care. Our goal is always to provide you with excellent care. Hearing back from our patients is one way we can continue to improve our services. Please take a few minutes to complete the written survey that you may receive in the mail after your visit with us. Thank you!             Your Updated Medication List - Protect others around you: Learn how to safely use, store and throw away your medicines at www.disposemymeds.org.          This list is accurate as of 3/2/18  2:27 PM.  Always use your most recent med list.                   Brand Name Dispense Instructions for use Diagnosis    paliperidone 234 MG/1.5ML Susp    INVEGA SUSTENNA    1.5 mL    Inject 1.5 mLs (234 mg) into the muscle every 28 days    Schizophrenia (H)       sertraline 100 MG tablet    ZOLOFT     45 tablet    Take 1.5 tablets (150 mg) by mouth daily    Depression, unspecified depression type

## 2018-03-02 NOTE — NURSING NOTE
"Jasen Guillen III comes into clinic today at the request of Dr. Rios Ordering Provider for Med Injection only Kelsey Rodriguez.    OBSERVATIONS    Prior to administration pt identified by name and :  Yes    1.  Appearance:  Clean and casual dress, hair somewhat unkempt.  2.  Mood:  \"good\"  3.  Affect:  Mood congruent  4.  Interactions:  Appropriate.  Pt reports that there have been improvements in delusions since last visit.  Notes that they were previously constant and have had \"slow improvement\".  Are decreased to 1 delusion per hour.  Pt reports that he is able to disregard these thoughts when they occur, however are bothersome.  With regard to mood, pt states that he is \"pretty good\".  Discussed possibility of meeting with provider today if there are concerns.  Due to med changes made at last visit and persisting delusions, pt requests to meet with provider today if possible.  Denies concerns about crisis. As this may not be possible, pt also mentions that he is okay with waiting until scheduled appt on 3/23/18. Pt reports that he will be unavailable after 2:45 pm.  Provider notified, however no response by 2:25 pm.  Pt chooses to leave.  5.  Eye contact:  Good  6.  Side Effects:  Denies  7.  Education:  Encouraged pt to call clinic as needed prior to next visit  8.  Level of Cooperation:  Good   9.  Compliance:  Full  10.  Next appointment:  3/23/18- provider, 3/30/18- injection    This service provided today was under the supervising provider of the day Dr. Rios, who was available if needed.    Rosalie Dwyer    "

## 2018-03-02 NOTE — PROGRESS NOTES
Pt has miss scheduled appointments today for injection and provider visit.    LVM for pt to return call to clinic.

## 2018-03-23 ENCOUNTER — TELEPHONE (OUTPATIENT)
Dept: PSYCHIATRY | Facility: CLINIC | Age: 23
End: 2018-03-23

## 2018-03-23 NOTE — TELEPHONE ENCOUNTER
----- Message from Traci Hannah sent at 3/23/2018 11:06 AM CDT -----  Regarding: Pt said  called him  Contact: [phone]  Pt said he is sorry he is just forgetting his appt lately, he is not sure why.    Ok to leave VM: Yes

## 2018-03-30 ENCOUNTER — ALLIED HEALTH/NURSE VISIT (OUTPATIENT)
Dept: PSYCHIATRY | Facility: CLINIC | Age: 23
End: 2018-03-30
Payer: COMMERCIAL

## 2018-03-30 DIAGNOSIS — Z79.899 ENCOUNTER FOR LONG-TERM (CURRENT) USE OF MEDICATIONS: ICD-10-CM

## 2018-03-30 DIAGNOSIS — F20.9 SCHIZOPHRENIA, UNSPECIFIED TYPE (H): Primary | ICD-10-CM

## 2018-03-30 DIAGNOSIS — F32.A DEPRESSION, UNSPECIFIED DEPRESSION TYPE: ICD-10-CM

## 2018-03-30 PROCEDURE — 96372 THER/PROPH/DIAG INJ SC/IM: CPT | Mod: ZF

## 2018-03-30 RX ORDER — SERTRALINE HYDROCHLORIDE 100 MG/1
150 TABLET, FILM COATED ORAL DAILY
Qty: 45 TABLET | Refills: 0 | Status: SHIPPED | OUTPATIENT
Start: 2018-03-30 | End: 2018-12-14

## 2018-03-30 NOTE — MR AVS SNAPSHOT
After Visit Summary   3/30/2018    Jasen Guillen III    MRN: [id number]           Patient Information     Date Of Birth          1995        Visit Information        Provider Department      3/30/2018 2:30 PM Nurse, [facility] Psychiatry Psychiatry Clinic        Today's Diagnoses     Schizophrenia, unspecified type (H)    -  1    Encounter for long-term (current) use of medications           Follow-ups after your visit        Your next 10 appointments already scheduled     2018  8:15 AM CDT   First Episode Return with Lynnette Rios MD   Psychiatry Clinic ([facility])    [place] [id number]  [facility] [address] [id number] [phone]            2018  1:30 PM CDT   Nurse Visit with [facility] Psychiatry Nurse   Psychiatry Clinic ([facility])    [place] [id number]  [facility] [address] [id number] [phone]              Who to contact     Please call your clinic at [phone] to:    Ask questions about your health    Make or cancel appointments    Discuss your medicines    Learn about your test results    Speak to your doctor            Additional Information About Your Visit        MyChart Information     Kosmix is an electronic gateway that provides easy, online access to your medical records. With Kosmix, you can request a clinic appointment, read your test results, renew a prescription or communicate with your care team.     To sign up for AriadNEXTt visit the website at [email or web address]   You will be asked to enter the access code listed below, as well as some personal information. Please follow the directions to create your username and password.     Your access code is: [email or web address]  Expires: 2018  6:41 PM     Your access code will  in 90 days. If you need help or a new code, please contact your [facility] or call [phone] for  assistance.        Care EveryWhere ID     This is your Care EveryWhere ID. This could be used by other organizations to access your [place] medical records  [id number]         Blood Pressure from Last 3 Encounters:   03/02/18 104/67   02/09/18 96/64   12/01/17 109/69    Weight from Last 3 Encounters:   02/09/18 82.5 kg (181 lb 12.8 oz)   12/01/17 80.7 kg (178 lb)   04/18/17 81.9 kg (180 lb 9.6 oz)              Today, you had the following     No orders found for display       Primary Care Provider Fax #    Physician No Ref-Primary [phone]       No address on file        Equal Access to Services     JENNIFER CASTILLO : Giovany Ruby, apolinar bazzi, shelly russell, adrian castaneda. So [facility] [phone].    ATENCIÓN: Si habla español, tiene a ventura disposición servicios gratuitos de asistencia lingüística. Llame al [phone].    We comply with applicable federal civil rights laws and [place] laws. We do not discriminate on the basis of race, color, national origin, age, disability, sex, sexual orientation, or gender identity.            Thank you!     Thank you for choosing PSYCHIATRY CLINIC  for your care. Our goal is always to provide you with excellent care. Hearing back from our patients is one way we can continue to improve our services. Please take a few minutes to complete the written survey that you may receive in the mail after your visit with us. Thank you!             Your Updated Medication List - Protect others around you: Learn how to safely use, store and throw away your medicines at www.disposemymeds.org.          This list is accurate as of 3/30/18  2:58 PM.  Always use your most recent med list.                   Brand Name Dispense Instructions for use Diagnosis    paliperidone 234 MG/1.5ML Susp    INVEGA SUSTENNA    1.5 mL    Inject 1.5 mLs (234 mg) into the muscle every 28 days    Schizophrenia (H)       sertraline 100 MG tablet    ZOLOFT     45 tablet    Take 1.5 tablets (150 mg) by mouth daily    Depression, unspecified depression type

## 2018-03-30 NOTE — NURSING NOTE
"Jasen Guillen III comes into clinic today at the request of Dr. Rios Ordering Provider for Med Injection only Kelsey Rodriguez.    OBSERVATIONS    Prior to administration pt identified by name and :  Yes    1.  Appearance:  Casual and clean dress  2.  Mood:  \"good\"  3.  Affect:  Mood congruent  4.  Interactions:  Appropriate.  Pt immediately apologetic about arriving late for injection and missed appt with Dr. Rios.  Does not believe that multiple missed appts are correlated to recent psychosis exacerbation.  Reports that he is doing well and has been in contact with Dr. Rios.  Requests Zoloft refill.  5.  Eye contact:  Good  6.  Side Effects:  Denies  7.  Education:  None needed  8.  Level of Cooperation:  Good  9.  Compliance:  Full  10.  Next appointment:  18- provider, 18- injection    This service provided today was under the supervising provider of the day Dr. Rios, who was available if needed.    Rosalie Dwyer    "

## 2018-04-06 ENCOUNTER — OFFICE VISIT (OUTPATIENT)
Dept: PSYCHIATRY | Facility: CLINIC | Age: 23
End: 2018-04-06
Attending: PSYCHIATRY & NEUROLOGY
Payer: COMMERCIAL

## 2018-04-06 VITALS — DIASTOLIC BLOOD PRESSURE: 67 MMHG | WEIGHT: 188 LBS | SYSTOLIC BLOOD PRESSURE: 95 MMHG | HEART RATE: 79 BPM

## 2018-04-06 DIAGNOSIS — F20.9 SCHIZOPHRENIA, UNSPECIFIED TYPE (H): Primary | ICD-10-CM

## 2018-04-06 DIAGNOSIS — F32.5 MAJOR DEPRESSION IN COMPLETE REMISSION (H): ICD-10-CM

## 2018-04-06 PROCEDURE — G0463 HOSPITAL OUTPT CLINIC VISIT: HCPCS | Mod: ZF

## 2018-04-06 RX ORDER — OMEGA-3/DHA/EPA/FISH OIL 200-300 MG
2 CAPSULE ORAL 2 TIMES DAILY
Qty: 120 CAPSULE | Refills: 0 | COMMUNITY
Start: 2018-04-06 | End: 2019-09-06

## 2018-04-06 ASSESSMENT — PAIN SCALES - GENERAL: PAINLEVEL: NO PAIN (1)

## 2018-04-06 NOTE — MR AVS SNAPSHOT
After Visit Summary   2018    Jasen Guillen III    MRN: [id number]           Patient Information     Date Of Birth          1995        Visit Information        Provider Department      2018 8:15 AM Lynnette Rios MD Psychiatry Clinic        Today's Diagnoses     Schizophrenia, unspecified type (H)    -  1    Major depression in complete remission (H)           Follow-ups after your visit        Your next 10 appointments already scheduled     2018  1:30 PM CDT   Nurse Visit with [facility] Psychiatry Nurse   Psychiatry Clinic ([facility])    [place] [id number]  [facility] [phone] [phone]            May 25, 2018  9:15 AM CDT   First Episode Return with Lynnette Rios MD   Psychiatry Clinic ([facility])    [place] [id number]  [facility] [phone] [phone]              Who to contact     Please call your clinic at [phone] to:    Ask questions about your health    Make or cancel appointments    Discuss your medicines    Learn about your test results    Speak to your doctor            Additional Information About Your Visit        MyChart Information     Accelereach is an electronic gateway that provides easy, online access to your medical records. With Accelereach, you can request a clinic appointment, read your test results, renew a prescription or communicate with your care team.     To sign up for Minyanvillet visit the website at [email or web address]   You will be asked to enter the access code listed below, as well as some personal information. Please follow the directions to create your username and password.     Your access code is: [email or web address]  Expires: 2018  6:41 PM     Your access code will  in 90 days. If you need help or a new code, please contact your [facility] or call [phone] for assistance.         Care EveryWhere ID     This is your Care EveryWhere ID. This could be used by other organizations to access your [place] medical records  [id number]        Your Vitals Were     Pulse                   79            Blood Pressure from Last 3 Encounters:   04/06/18 95/67   03/02/18 104/67   02/09/18 96/64    Weight from Last 3 Encounters:   04/06/18 85.3 kg (188 lb)   02/09/18 82.5 kg (181 lb 12.8 oz)   12/01/17 80.7 kg (178 lb)              Today, you had the following     No orders found for display         Today's Medication Changes          These changes are accurate as of 4/6/18 11:20 AM.  If you have any questions, ask your nurse or doctor.               Start taking these medicines.        Dose/Directions    ULTRA OMEGA 3 1000 MG Caps   Started by:  Lynnette Rios MD        Dose:  2 capsule   Take 2 capsules by mouth 2 times daily Nordic Naturals Ultimate Omega Brand. 1.1g EPA+DHA per 2 capsules. Pt to take 1.1g BID   Quantity:  120 capsule   Refills:  0         These medicines have changed or have updated prescriptions.        Dose/Directions    paliperidone 156 MG/ML Susp injection   Commonly known as:  INVEGA SUSTENNA   This may have changed:    - medication strength  - how much to take  - when to take this   Changed by:  Lynnette Rios MD        Dose:  156 mg   Inject 1 mL (156 mg) into the muscle once for 1 dose   Quantity:  1 mL   Refills:  6            Where to get your medicines      Some of these will need a paper prescription and others can be bought over the counter.  Ask your nurse if you have questions.     You don't need a prescription for these medications     paliperidone 156 MG/ML Susp injection    ULTRA OMEGA 3 1000 MG Caps                Primary Care Provider Fax #    Physician No Ref-Primary [phone]       No address on file        Equal Access to Services     JENNIFER CASTILLO AH: apolinar Ferris qaybta kaalmada adeegyada, waxay idiin hayaan  dena cheekqamarjohnathan ochoa'aacarolina ah. So [facility] [phone].    ATENCIÓN: Si irene mcclelland, tiene a ventura disposición servicios gratuitos de asistencia lingüística. Silas al [phone].    We comply with applicable federal civil rights laws and [place] laws. We do not discriminate on the basis of race, color, national origin, age, disability, sex, sexual orientation, or gender identity.            Thank you!     Thank you for choosing PSYCHIATRY CLINIC  for your care. Our goal is always to provide you with excellent care. Hearing back from our patients is one way we can continue to improve our services. Please take a few minutes to complete the written survey that you may receive in the mail after your visit with us. Thank you!             Your Updated Medication List - Protect others around you: Learn how to safely use, store and throw away your medicines at www.disposemymeds.org.          This list is accurate as of 4/6/18 11:20 AM.  Always use your most recent med list.                   Brand Name Dispense Instructions for use Diagnosis    paliperidone 156 MG/ML Susp injection    INVEGA SUSTENNA    1 mL    Inject 1 mL (156 mg) into the muscle once for 1 dose        sertraline 100 MG tablet    ZOLOFT    45 tablet    Take 1.5 tablets (150 mg) by mouth daily    Depression, unspecified depression type       ULTRA OMEGA 3 1000 MG Caps     120 capsule    Take 2 capsules by mouth 2 times daily Nordic Naturals Ultimate Omega Brand. 1.1g EPA+DHA per 2 capsules. Pt to take 1.1g BID

## 2018-04-06 NOTE — PROGRESS NOTES
"  PSYCHIATRY CLINIC PROGRESS NOTE     CC:  \"My symptoms are much better-- things are going well.\"    INTERIM HISTORY                                                   Jasen Guillen III is a 21 year old male with a diagnosis of schizophrenia (for past ~ 2 years) who comes in for visit today.    Receiving paliperidone injections, 234 mg q month.    Has also been taking Cogentin 0.5 mg po qam for oculogyric spasms, which he  increased to 1 mg, but recently stopped, as the muscle spasms of been resolved over the past month. No longer taking Cogentin.    Also taking sertraline recently increased from 100 to 150 mg po qam for depressive and anxiety sx that had recurred.    Classes are going well-- his schedule is \"pretty light\".      PSYCH ROS:    Positive Psychotic symptoms:      Recurrence of ideas of reference that occurred about 2 months ago are now resolved.  No further IOR.  Does not report any feelings of paranoia or being in danger.  Denies hallucinations or disorganized thinking.     Negative psychotic symptoms:    Anhedonia and amotivation are resolved.  Patient engaging in activities with friends (such as swing dancing on Thursday evenings)    Mood symptoms:     Sx of depression and anxiety much better on higher dose of sertraline.  Energy level normal.   No feelings of hopelessness or of self-harm. No SI.    Cognition:    Attention and concentration better.    Substance use: Not present.    Obsessive-compulsive symptoms: Not present    MEDICAL ROS:       Constitutional: No fever, fatigue.  Has had some weight loss due to eating less, but this is stable.    Neurologic: Denies problems with salivation, muscle stiffness, motor restlessness. Eye spasms have resolved. Denies other abnormal motor movements.  No dizziness.    HEENT: Mild dry mouth. No dry eyes.    Cardiovascular: No chest pain or palpitations.    Respiratory: No shortness of breath, dyspnea on exertion, or wheezing.    Gastrointestinal: No nausea " "or constipation     : No symptoms reported    Musculoskeletal:No musculoskeletal difficulties.    PSYCHOSOCIAL HX:    Planning to stay here over the summer and take classes.  Has a close relationship with friends in [employer] group-- is able to share his personal hx with them and get support.      PAST PSYCH HISTORY:    Received intensive treatment 2 years ago for an enduring episode of psychosis that was characterized predominantly by ideas of reference (feeling that he was being talked about insolvency would listen to over the Internet) he also experienced disorganized thinking and disorganized behavior.    Of note, this patient's past andrecent presentation has been characterized by the presence of mild chronic dyskinetic movements, now much improved.    ALLERGY                                Review of patient's allergies indicates no known allergies.  MEDICATIONS                               Paliperidone 234 IM q 28 days  Sertraline 100 mg po qd     VITALS     BP 95/67  Pulse 79  Wt 85.3 kg (188 lb)  MENTAL STATUS EXAM                                                             Orientation:  x4  Gait and Station:  Walks smoothly in squires, normal arm swing, normal gait.  No UE stifness.  Very mild fleeting dyskinetic movements observable in face and hands. No masked facies.  Alertness: Alert, oriented.  Appearance: Neatly and casually dressed, carrying backpack.  Behavior/Demeanor: Calm, cooperative, pleasant, with excellent eye contact.  Appropriately interactive.  Speech: Normal rate and rhythm, normal volume.  Language: Language is cogent, descriptive of his activities  Psychomotor: Normal PM speed. No slowing observed.   Mood: \"very good.\"  Affect:Full range, expressive, appropriate to content.  Thought Process/Associations: Linear, logical.  Thought Content: Denies delusional thoughts, ideas of reference, SI. HI.    Perception:  Denies hallucinations. No illusions.  Insight: Excellent.  Judgment: " Excellent  Cognition:  Intact  Recent and Remote Memory: Intact  Attention Span and Concentration:  Intact during exam  Fund of Knowledge:excellent        DIAGNOSIS     Schizophrenia, doing very well with no overt sx.  Major depression, in remission.  Extrapyramidal side effects (oculogyrc spasms), resolved spontanesouly, no longer on Cogentin.     ASSESSMENT                                     Schizophrenia, in remission, patient feels that he is now in a very stable place and would like to try and reduce injectable paliperidone. He is due for aninjection in 3 weeks-- at that point, we will decrease dose to 154 mg, and I will follow-up with him 3 weeks after that.    In terms of the depressive symptoms, the patient experienced some recurrence of sx a few months ago, but these have responded well to increase in sertraline 150 mg.  No further sxc at present.    Also of note, his prior negative symptoms of lack of motivation, lack of energy, and anhedonia, now are resolved. The patient reports that he is experiencing strong motivation and enjoyment.    Cognitive symptoms:Cognition is improved based on subjective self-report (patient recently participated in cognitive training study).       PLAN                                                                                                       1) Positive Psychotic Sx:   Reduce injectable paliperidone to 156 mg po q month, injection due in 3 weeks.                 No longer requiring Cogentin for EPS.              Initiate omega-3 essential fatty acids at next visit-- recommended NOrdi Naturals 2 caps po bid. Pt to buy online.    2) Negative psychotic symptoms:      Continue to support involvement in social activities on campus-- patient very active in [employer] group.  Patient also participating in young adults group.    3) Mood and anxiety symptoms:   Continue sertraline at increased dose of 150 mg po qd.    4)  RTC 6 weeks        TREATMENT RISK STATEMENT:  The risks,  "benefits, alternatives and potential adverse effects have been discussed and are understood by the patient/ patient's guardian. The pt understands the risks of using street drugs or alcohol.  There are no medical contraindications, the pt agrees to treatment with the ability to do so.  The patient understands to call 911 or come to the nearest ED if life threatening or urgent symptoms present.    PSYCHIATRY CLINIC INDIVIDUAL PSYCHOTHERAPY NOTE   Length of session: 20 minutes, 8:20 to 8:40  Date of most recent treatment plan:today      Subjective: \"Things are going much better.\"   Plan:    Continue supportive psychotherapy during monthly medication management visits.   Continue [employer] [facility]    Psychotherapy services during this visit included  myself and Jasen Guillen.  Issues addressed in therapy included: how to manage observing his sx when they recur, and feeling  Prepared for a trial of a reduced dose of medication   and were addressed using primarily supportive and CBT techniques.        TREATMENT  PLAN          SYMPTOMS;PROBLEMS   MEASURABLE GOALS;    FUNCTIONAL IMPROVEMENT INTERVENTIONS;    GAINS MADE DISCHARGE CRITERIA   Low energy, lack of motivation Patient will experience a significant improvement in energy and motivation, and will be able to engage in outsidesocializing activities as well as classwork Supportive Psychotherapy once per month    Patient enjoying active social life Ongoing engagement with social activities   Ideas of reference during times of stress Patient will no longer experience ideas of reference Monthly injectable paliperidone  CBT techniques  IOR improved after recent flare-up Two years without symptoms, followed by two years of monitoring without symptoms relapse   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       "

## 2018-04-06 NOTE — NURSING NOTE
Chief Complaint   Patient presents with     Recheck Medication     Undifferentiated schizophrenia (H

## 2018-04-07 ASSESSMENT — PATIENT HEALTH QUESTIONNAIRE - PHQ9: SUM OF ALL RESPONSES TO PHQ QUESTIONS 1-9: 1

## 2018-04-27 ENCOUNTER — ALLIED HEALTH/NURSE VISIT (OUTPATIENT)
Dept: PSYCHIATRY | Facility: CLINIC | Age: 23
End: 2018-04-27
Payer: COMMERCIAL

## 2018-04-27 DIAGNOSIS — F20.9 SCHIZOPHRENIA, UNSPECIFIED TYPE (H): Primary | ICD-10-CM

## 2018-04-27 DIAGNOSIS — Z79.899 ENCOUNTER FOR LONG-TERM (CURRENT) USE OF MEDICATIONS: ICD-10-CM

## 2018-04-27 PROCEDURE — 96372 THER/PROPH/DIAG INJ SC/IM: CPT | Mod: ZF

## 2018-04-27 PROCEDURE — 25000128 H RX IP 250 OP 636: Mod: ZF

## 2018-04-27 NOTE — MR AVS SNAPSHOT
After Visit Summary   2018    Jasen Guillne III    MRN: [id number]           Patient Information     Date Of Birth          1995        Visit Information        Provider Department      2018 11:30 AM Nurse, [facility] Psychiatry Psychiatry Clinic        Today's Diagnoses     Schizophrenia, unspecified type (H)    -  1    Encounter for long-term (current) use of medications           Follow-ups after your visit        Your next 10 appointments already scheduled     May 25, 2018  9:15 AM CDT   First Episode Return with Lynnette Rios MD   Psychiatry Clinic ([facility])    [place] [id number]  [facility] [address] [id number] [phone]            May 25, 2018 10:00 AM CDT   Nurse Visit with [facility] Psychiatry Nurse   Psychiatry Clinic ([facility])    [place] [id number]  [facility] [id number] [phone]              Who to contact     Please call your clinic at [phone] to:    Ask questions about your health    Make or cancel appointments    Discuss your medicines    Learn about your test results    Speak to your doctor            Additional Information About Your Visit        MyChart Information     Cubie is an electronic gateway that provides easy, online access to your medical records. With Cubie, you can request a clinic appointment, read your test results, renew a prescription or communicate with your care team.     To sign up for [employer] visit the website at [email or web address]   You will be asked to enter the access code listed below, as well as some personal information. Please follow the directions to create your username and password.     Your access code is: [email or web address]  Expires: 2018  6:41 PM     Your access code will  in 90 days. If you need help or a new code, please contact your [facility] or call [phone] for  assistance.        Care EveryWhere ID     This is your Care EveryWhere ID. This could be used by other organizations to access your [facility] medical records  [id number]         Blood Pressure from Last 3 Encounters:   04/06/18 95/67   03/02/18 104/67   02/09/18 96/64    Weight from Last 3 Encounters:   04/06/18 85.3 kg (188 lb)   02/09/18 82.5 kg (181 lb 12.8 oz)   12/01/17 80.7 kg (178 lb)              Today, you had the following     No orders found for display       Primary Care Provider Fax #    Physician No Ref-Primary [phone]       No address on file        Equal Access to Services     ESTRELLITA CASTILLO : Giovany Ruby, apolinar bazzi, shelly russell, adrian guallpa . So [facility] [phone].    ATENCIÓN: Si habla español, tiene a ventura disposición servicios gratuitos de asistencia lingüística. Llame al [phone].    We comply with applicable federal civil rights laws and [place] laws. We do not discriminate on the basis of race, color, national origin, age, disability, sex, sexual orientation, or gender identity.            Thank you!     Thank you for choosing PSYCHIATRY CLINIC  for your care. Our goal is always to provide you with excellent care. Hearing back from our patients is one way we can continue to improve our services. Please take a few minutes to complete the written survey that you may receive in the mail after your visit with us. Thank you!             Your Updated Medication List - Protect others around you: Learn how to safely use, store and throw away your medicines at www.disposemymeds.org.          This list is accurate as of 4/27/18 12:35 PM.  Always use your most recent med list.                   Brand Name Dispense Instructions for use Diagnosis    paliperidone 156 MG/ML Susp injection    INVEGA SUSTENNA    1 mL    Inject 1 mL (156 mg) into the muscle once for 1 dose        sertraline 100 MG tablet    ZOLOFT    45 tablet    Take 1.5  tablets (150 mg) by mouth daily    Depression, unspecified depression type       ULTRA OMEGA 3 1000 MG Caps     120 capsule    Take 2 capsules by mouth 2 times daily Nordic Naturals Ultimate Omega Brand. 1.1g EPA+DHA per 2 capsules. Pt to take 1.1g BID

## 2018-04-27 NOTE — NURSING NOTE
"Jasen Guillen III comes into clinic today at the request of Dr. Rios Ordering Provider for Med Injection only Invega Sustenna.    OBSERVATIONS    Prior to administration pt identified by name and :  Yes    1.  Appearance:  Clean and casual dress.  Wearing shorts, malik shirt, and eyeglasses.  2.  Mood:  \"I'm well\"  3.  Affect:  Mood congruent  4.  Interactions:  Appropriate.  Discussed decrease in Invega Sustenna injection dose from 234- 156 mg.  Pt agreeable with plan.  Encourage pt to call between appts if has an increased in symptoms or concern about med decrease.    5.  Eye contact:  Good  6.  Side Effects:  Denies  7.  Education:  See above  8.  Level of Cooperation:  Good  9.  Compliance:  Full  10.  Next appointment:  18- provider, injection    This service provided today was under the supervising provider of the day Dr. Purcell, who was available if needed.    Rosalie Dwyer    "

## 2018-05-01 ENCOUNTER — CARE COORDINATION (OUTPATIENT)
Dept: PSYCHIATRY | Facility: CLINIC | Age: 23
End: 2018-05-01

## 2018-05-01 ENCOUNTER — HOSPITAL ENCOUNTER (EMERGENCY)
Facility: CLINIC | Age: 23
Discharge: HOME | End: 2018-05-01
Attending: FAMILY MEDICINE | Admitting: FAMILY MEDICINE
Payer: COMMERCIAL

## 2018-05-01 VITALS
DIASTOLIC BLOOD PRESSURE: 72 MMHG | SYSTOLIC BLOOD PRESSURE: 121 MMHG | RESPIRATION RATE: 16 BRPM | WEIGHT: 188 LBS | TEMPERATURE: 97.9 F | OXYGEN SATURATION: 96 % | HEART RATE: 70 BPM

## 2018-05-01 DIAGNOSIS — R49.9 CHANGE IN VOICE: ICD-10-CM

## 2018-05-01 DIAGNOSIS — F20.9 SCHIZOPHRENIA, UNSPECIFIED TYPE (H): ICD-10-CM

## 2018-05-01 LAB
AMPHETAMINES UR QL SCN: NEGATIVE
BARBITURATES UR QL: NEGATIVE
BENZODIAZ UR QL: NEGATIVE
CANNABINOIDS UR QL SCN: NEGATIVE
COCAINE UR QL: NEGATIVE
ETHANOL UR QL SCN: NEGATIVE
OPIATES UR QL SCN: NEGATIVE

## 2018-05-01 PROCEDURE — 25000132 ZZH RX MED GY IP 250 OP 250 PS 637: Performed by: FAMILY MEDICINE

## 2018-05-01 PROCEDURE — 99284 EMERGENCY DEPT VISIT MOD MDM: CPT | Mod: Z6 | Performed by: FAMILY MEDICINE

## 2018-05-01 PROCEDURE — 80307 DRUG TEST PRSMV CHEM ANLYZR: CPT | Performed by: FAMILY MEDICINE

## 2018-05-01 PROCEDURE — 80320 DRUG SCREEN QUANTALCOHOLS: CPT | Performed by: FAMILY MEDICINE

## 2018-05-01 PROCEDURE — 99285 EMERGENCY DEPT VISIT HI MDM: CPT | Performed by: FAMILY MEDICINE

## 2018-05-01 RX ORDER — PALIPERIDONE 3 MG/1
3 TABLET, EXTENDED RELEASE ORAL ONCE
Status: COMPLETED | OUTPATIENT
Start: 2018-05-01 | End: 2018-05-01

## 2018-05-01 RX ORDER — LORAZEPAM 0.5 MG/1
0.5 TABLET ORAL ONCE
Status: COMPLETED | OUTPATIENT
Start: 2018-05-01 | End: 2018-05-01

## 2018-05-01 RX ORDER — LORAZEPAM 0.5 MG/1
0.5 TABLET ORAL EVERY 8 HOURS PRN
Qty: 10 TABLET | Refills: 0 | Status: SHIPPED | OUTPATIENT
Start: 2018-05-01 | End: 2018-11-02

## 2018-05-01 RX ORDER — PALIPERIDONE 3 MG/1
TABLET, EXTENDED RELEASE ORAL
Qty: 6 TABLET | Refills: 0 | Status: SHIPPED | OUTPATIENT
Start: 2018-05-01 | End: 2018-11-02

## 2018-05-01 RX ADMIN — PALIPERIDONE 3 MG: 3 TABLET, EXTENDED RELEASE ORAL at 19:49

## 2018-05-01 RX ADMIN — LORAZEPAM 0.5 MG: 0.5 TABLET ORAL at 18:36

## 2018-05-01 ASSESSMENT — ENCOUNTER SYMPTOMS
FEVER: 0
SHORTNESS OF BREATH: 0
ABDOMINAL PAIN: 0

## 2018-05-01 NOTE — ED AVS SNAPSHOT
[facility], [place], Emergency Department    [address]    [place] [address]    Phone:  [phone]    Fax:  [phone]                                       Jasen Guillen III   MRN: [id number]    Department:  [facility], Emergency Department   Date of Visit:  5/1/2018           After Visit Summary Signature Page     I have received my discharge instructions, and my questions have been answered. I have discussed any challenges I see with this plan with the nurse or doctor.    ..........................................................................................................................................  Patient/Patient Representative Signature      ..........................................................................................................................................  Patient Representative Print Name and Relationship to Patient    ..................................................               ................................................  Date                                            Time    ..........................................................................................................................................  Reviewed by Signature/Title    ...................................................              ..............................................  Date                                                            Time

## 2018-05-01 NOTE — ED TRIAGE NOTES
patient's psychiatrist - Dr. Rios - told him that ED docs could call her.  Patient said that about an hour ago he started making uncontrolled sounds out of his mouth.  Never had this before.    No SI nor HI.      Calm and cooperative.

## 2018-05-01 NOTE — PROGRESS NOTES
"Aspen Morales Michelle, RN        Phone Number: [phone]                     Caller:  pt   Medication:  Invega injection   Symptoms: uncontrollable words and sounds coming out of his mouth--when he was giving me his phone number he was tripping over his words and his voice was changing drastically in pitch   Symptom onset: an hour   Pending appt: 5/25   Okay to leave VM:  unsure       Last appt: 4/6/18  Pend: 5/25/18    Pt received Invega Sustenna injection on 4/27/18, however this was a dose decrease from 234 mg --> 156 mg    Writer returned call to pt to discuss. Pt reports that over last hour has had an increase in \"uncontrollable voices\" and \"purposeful movements\".  Pt states \"I have never experienced anything like this and it feels scary\".  Wonders if he should go to ED.  Writer asks for additional information about concerns.  Pt describes movements as \"scratching self\" and \"balling fists\", again states that he cannot stop.  Inquired if pt was feeling restless and a need to move, pt states that he is noticing a mild increase in activity and has a \"need to shake my head\".  Denies ocular spasms or other EPS as he previously experienced.  Writer inquired about vocalizations.  Pt describes making a \"whining sound\" and \"airplane noises\".  Is unable to articulate other voice changes that he notices.  Pt's voice has increased pitch x 1 during call.    Pt denies any sx of psychosis.  Denies AH/VH and paranoia.  Denies SI/HI/SIB urges, yet is scratching uncontrollably.    Pt reports that he called Dr. Rios's personal line ~ 30 mins ago and was informed that she was unavailable.  Writer stated would also try to reach Dr. Rios, however uncertain if she will be able to respond before end of day. Encouraged pt to present to ED at any time with urgent concerns.  Reviewed [facility] location and pt confirms that he has transportation available.  Writer also offered option of calling after hours line for " consultation with an MD after 5:00 if no response from Dr. Rios.  Writer stated would call pt back before 4:30 with further direction if unable to reach Dr. Rios.    Sent msg to Dr. Rios

## 2018-05-01 NOTE — ED TRIAGE NOTES
patient's psychiatrist - Dr. Rios - told him that ED docs could call her.  Patient said that about an hour ago he started making uncontrolled sounds out of his mouth.  Never had this before.    Says he does have other tics like scratching or making fists in his hands and sometimes things are suggestable and he just does it.

## 2018-05-01 NOTE — ED AVS SNAPSHOT
[facility], Emergency Department    [address]    [place] [address]    Phone:  [phone]    Fax:  [phone]                                       Jasen Guillen III   MRN: [id number]    Department:  [facility], Emergency Department   Date of Visit:  5/1/2018           Patient Information     Date Of Birth          1995        Your diagnoses for this visit were:     Schizophrenia, unspecified type (H)     Change in voice        You were seen by Nick Knapp MD.      Follow-up Information     Follow up with Psychiatry Clinic.    Specialty:  Psychiatry    Why:  on Friday as a walk in Pt.    Contact information:    [address] [address] [phone]    Additional information:    Located in the [facility].  Parking is available in the red ramp on the [facility].  This ramp is located next to the [facility] on [facility].        Discharge Instructions       Discharged home with oral Invega as well as Ativan if needed.  Plan to go to your primary psychiatrist on Friday for further evaluation and recheck.    Your next 10 appointments already scheduled     May 25, 2018  9:15 AM CDT   First Episode Return with Lynnette Rios MD   Psychiatry Clinic ([facility])    [address] [address] [phone]            May 25, 2018 10:00 AM CDT   Nurse Visit with [facility] Psychiatry Nurse   Psychiatry Clinic ([facility])    [place] [id number]  [facility] [address] [address] [phone]              24 Hour Appointment Hotline       To make an appointment at any [facility], call [phone] ([phone]). If you don't have a family doctor or clinic, we will help you find one. [facility] are  conveniently located to serve the needs of you and your family.             Review of your medicines      START taking        Dose / Directions Last dose taken    LORazepam 0.5 MG tablet   Commonly known as:  ATIVAN   Dose:  0.5 mg   Quantity:  10 tablet        Take 1 tablet (0.5 mg) by mouth every 8 hours as needed for anxiety   Refills:  0        paliperidone 3 MG 24 hr tablet   Commonly known as:  INVEGA   Quantity:  6 tablet        Use 1 tab daily may use 2 tabs daily if needed   Refills:  0          Our records show that you are taking the medicines listed below. If these are incorrect, please call your family doctor or clinic.        Dose / Directions Last dose taken    paliperidone 156 MG/ML Susp injection   Commonly known as:  INVEGA SUSTENNA   Dose:  156 mg   Quantity:  1 mL        Inject 1 mL (156 mg) into the muscle once for 1 dose   Refills:  6        sertraline 100 MG tablet   Commonly known as:  ZOLOFT   Dose:  150 mg   Quantity:  45 tablet        Take 1.5 tablets (150 mg) by mouth daily   Refills:  0        ULTRA OMEGA 3 1000 MG Caps   Dose:  2 capsule   Quantity:  120 capsule        Take 2 capsules by mouth 2 times daily Nordic Naturals Ultimate Omega Brand. 1.1g EPA+DHA per 2 capsules. Pt to take 1.1g BID   Refills:  0                Prescriptions were sent or printed at these locations (2 Prescriptions)                   Other Prescriptions                Printed at Department/Unit printer (2 of 2)         LORazepam (ATIVAN) 0.5 MG tablet               paliperidone (INVEGA) 3 MG 24 hr tablet                Procedures and tests performed during your visit     Drug abuse screen 6 urine (tox)      Orders Needing Specimen Collection     None      Pending Results     No orders found from 4/29/2018 to 5/2/2018.            Pending Culture Results     No orders found from 4/29/2018 to 5/2/2018.            Pending Results Instructions     If you had any lab results that were not finalized at the time of  "your Discharge, you can call the ED Lab Result RN at [phone]. You will be contacted by this team for any positive Lab results or changes in treatment. The nurses are available 7 days a week from 10A to 6:30P.  You can leave a message 24 hours per day and they will return your call.        Thank you for choosing [facility]       Thank you for choosing [facility] for your care. Our goal is always to provide you with excellent care. Hearing back from our patients is one way we can continue to improve our services. Please take a few minutes to complete the written survey that you may receive in the mail after you visit with us. Thank you!        [email or web address] Information     TDX lets you send messages to your doctor, view your test results, renew your prescriptions, schedule appointments and more. To sign up, go to [email or web address] . Click on \"Log in\" on the left side of the screen, which will take you to the Welcome page. Then click on \"Sign up Now\" on the right side of the page.     You will be asked to enter the access code listed below, as well as some personal information. Please follow the directions to create your username and password.     Your access code is: [email or web address]  Expires: 2018  7:38 PM     Your access code will  in 90 days. If you need help or a new code, please call your [facility] clinic or [phone].        Care EveryWhere ID     This is your Care EveryWhere ID. This could be used by other organizations to access your [facility] medical records  [id number]        Equal Access to Services     [facility] AH: Hadii aad ku hadasho Sopingali, waaxda luqadaha, qaybta kaalmada adejavanyada, adrian guallpa . So [facility] [phone].    ATENCIÓN: Si habla español, tiene a ventura disposición servicios gratuitos de asistencia lingüística. Llame al [phone].    We comply with applicable federal civil rights laws and [place] laws. We do not discriminate on the basis of " race, color, national origin, age, disability, sex, sexual orientation, or gender identity.            After Visit Summary       This is your record. Keep this with you and show to your community pharmacist(s) and doctor(s) at your next visit.

## 2018-05-01 NOTE — LETTER
May 1, 2018      To Whom It May Concern:      Jasen Guillen III was seen in our Emergency Department today, 05/01/18.  I expect his condition to improve over the next 1-2 days.  He may return to school when improved.    Sincerely,        Nick Knapp MD

## 2018-05-01 NOTE — DISCHARGE INSTRUCTIONS
Discharged home with oral Invega as well as Ativan if needed.  Plan to go to your primary psychiatrist on Friday for further evaluation and recheck.

## 2018-05-02 NOTE — ED PROVIDER NOTES
"  History     Chief Complaint   Patient presents with     Psychiatric Evaluation     patient's psychiatrist - Dr. Rios - told him that ED docs could call her.  Patient said that about an hour ago he started making uncontrolled sounds out of his mouth.  Never had this before.     HPI  Jasen Guillen III is a 22 year old male who presents the emergency room after developing changes in his voice over the last several hours.  Patient has been previously diagnosed with schizophrenia and receives in Mcgrath injections monthly this dose was reduced from 200-156.  Patient denies any other significant changes in his medications no changes in diet no disruption of sleep.  Patient notes that about 3:00 this afternoon he began to have strange noises coming from his voice states that his voice changes rapidly and especially when he is \"thinking about it\"  Patient denies any illicit drug use denies any other medical concerns and notes that he does not have any suicidal or homicidal ideation.    I have reviewed the Medications, Allergies, Past Medical and Surgical History, and Social History in the Epic system.    PERSONAL MEDICAL HISTORY  Past Medical History:   Diagnosis Date     Schizophrenia (H)      PAST SURGICAL HISTORY  Past Surgical History:   Procedure Laterality Date     ENT SURGERY      wisdome teeth     FAMILY HISTORY  No family history on file.  SOCIAL HISTORY  Social History   Substance Use Topics     Smoking status: Former Smoker     Smokeless tobacco: Never Used     Alcohol use No     MEDICATIONS  Current Facility-Administered Medications   Medication     paliperidone (INVEGA) 24 hr tablet 3 mg     Current Outpatient Prescriptions   Medication     LORazepam (ATIVAN) 0.5 MG tablet     Omega-3 Fatty Acids (ULTRA OMEGA 3) 1000 MG CAPS     paliperidone (INVEGA SUSTENNA) 156 MG/ML SUSP injection     paliperidone (INVEGA) 3 MG 24 hr tablet     sertraline (ZOLOFT) 100 MG tablet     ALLERGIES  No Known " Allergies      Review of Systems   Constitutional: Negative for fever.   HENT:        Voice change noted with some tic-like behaviors as well.   Respiratory: Negative for shortness of breath.    Cardiovascular: Negative for chest pain.   Gastrointestinal: Negative for abdominal pain.   Neurological:        Patient with scratching of his face and tic-like behaviors including also voice change.   All other systems reviewed and are negative.      Physical Exam   BP: 110/53  Pulse: 81  Temp: 98.3  F (36.8  C)  Resp: 15  Weight: 85.3 kg (188 lb)  SpO2: 95 %      Physical Exam   Constitutional: He is oriented to person, place, and time. No distress.   HENT:   Head: Atraumatic.   Mouth/Throat: Oropharynx is clear and moist. No oropharyngeal exudate.   Eyes: Pupils are equal, round, and reactive to light. No scleral icterus.   Cardiovascular: Normal heart sounds and intact distal pulses.    Pulmonary/Chest: Breath sounds normal. No respiratory distress.   Abdominal: Soft. Bowel sounds are normal. There is no tenderness.   Musculoskeletal: He exhibits no edema or tenderness.   Neurological: He is alert and oriented to person, place, and time. No cranial nerve deficit. He exhibits normal muscle tone. Coordination normal.   Skin: Skin is warm. No rash noted. He is not diaphoretic.   Psychiatric: His mood appears anxious. Thought content is not paranoid. He expresses no homicidal and no suicidal ideation.       ED Course     ED Course     Procedures         Critical Care time:  none         Labs Ordered and Resulted from Time of ED Arrival Up to the Time of Departure from the ED   DRUG ABUSE SCREEN 6 CHEM DEP URINE ([facility])         Assessments & Plan (with Medical Decision Making)     I have reviewed the nursing notes.    I have reviewed the findings, diagnosis, plan and need for follow up with the patient.  Patient with history of schizophrenia now presenting with possible medication side effect with voice change and tic-like  behaviors possibly related to an increase in the IM in Mcgrath shot.  I discussed the case with patient's primary psychiatrist and at this time gave him both oral Ativan and oral intake with marked decrease in his symptoms with the oral Ativan.  Patient will follow up with his primary psychiatrist on Friday for recheck and further evaluation.  He has been instructed to return to the emergency room if he has any safety concerns or worsening of symptoms.    New Prescriptions    LORAZEPAM (ATIVAN) 0.5 MG TABLET    Take 1 tablet (0.5 mg) by mouth every 8 hours as needed for anxiety    PALIPERIDONE (INVEGA) 3 MG 24 HR TABLET    Use 1 tab daily may use 2 tabs daily if needed       Final diagnoses:   Schizophrenia, unspecified type (H)   Change in voice       5/1/2018   [facility], [place], EMERGENCY DEPARTMENT     Nick Knapp MD  05/01/18 2904

## 2018-05-04 ENCOUNTER — OFFICE VISIT (OUTPATIENT)
Dept: PSYCHIATRY | Facility: CLINIC | Age: 23
End: 2018-05-04
Attending: PSYCHIATRY & NEUROLOGY
Payer: COMMERCIAL

## 2018-05-04 ENCOUNTER — OFFICE VISIT (OUTPATIENT)
Dept: PHARMACY | Facility: CLINIC | Age: 23
End: 2018-05-04
Payer: COMMERCIAL

## 2018-05-04 VITALS — HEART RATE: 78 BPM | SYSTOLIC BLOOD PRESSURE: 110 MMHG | WEIGHT: 187.8 LBS | DIASTOLIC BLOOD PRESSURE: 74 MMHG

## 2018-05-04 DIAGNOSIS — F20.9 SCHIZOPHRENIA, UNSPECIFIED TYPE (H): Primary | ICD-10-CM

## 2018-05-04 DIAGNOSIS — F32.5 MAJOR DEPRESSION IN COMPLETE REMISSION (H): ICD-10-CM

## 2018-05-04 PROCEDURE — 99207 ZZC NO CHARGE LOS: CPT | Performed by: PHARMACIST

## 2018-05-04 ASSESSMENT — PAIN SCALES - GENERAL: PAINLEVEL: NO PAIN (0)

## 2018-05-04 NOTE — MR AVS SNAPSHOT
After Visit Summary   5/4/2018    Jasen Guillen III    MRN: [id number]           Patient Information     Date Of Birth          1995        Visit Information        Provider Department      5/4/2018 8:00 AM Sarah Peraza [facility] Psychiatry        Today's Diagnoses     Schizophrenia, unspecified type (H)    -  1       Follow-ups after your visit        Your next 10 appointments already scheduled     May 25, 2018 10:00 AM CDT   Nurse Visit with [facility] Psychiatry Nurse   Psychiatry Clinic ([facility])    [place] [id number]  [address] [place] [id number] [phone]            Roland 15, 2018  8:15 AM CDT   First Episode Return with Lynnette Rios MD   Psychiatry Clinic ([facility])    [place] [id number]  [facility] [address] [id number] [phone]              Who to contact     If you have questions or need follow up information about today's clinic visit or your schedule please contact [facility] directly at [phone].  Normal or non-critical lab and imaging results will be communicated to you by [email or web address], letter or phone within 4 business days after the clinic has received the results. If you do not hear from us within 7 days, please contact the clinic through [employer] or phone. If you have a critical or abnormal lab result, we will notify you by phone as soon as possible.  Submit refill requests through [employer] or call your pharmacy and they will forward the refill request to us. Please allow 3 business days for your refill to be completed.          Additional Information About Your Visit        [email or web address] Information     [employer] gives you secure access to your electronic health record. If you see a primary care provider, you can also send messages to your care team and make appointments. If you have questions, please call your  primary care clinic.  If you do not have a primary care provider, please call [phone] and they will assist you.        Care EveryWhere ID     This is your Care EveryWhere ID. This could be used by other organizations to access your [place] medical records  [id number]         Blood Pressure from Last 3 Encounters:   05/04/18 110/74   05/01/18 121/72   04/06/18 95/67    Weight from Last 3 Encounters:   05/04/18 187 lb 12.8 oz (85.2 kg)   05/01/18 188 lb (85.3 kg)   04/06/18 188 lb (85.3 kg)              Today, you had the following     No orders found for display       Primary Care Provider Fax #    Physician No Ref-Primary [phone]       No address on file        Equal Access to Services     JENNIFER CASTILLO : Giovany mccabeo Soaline, waaxda luqadaha, qaybta kaalmada adejavanyada, adrian guallpa . So [facility] [phone].    ATENCIÓN: Si habla español, tiene a ventura disposición servicios gratuitos de asistencia lingüística. Llame al [phone].    We comply with applicable federal civil rights laws and [place] laws. We do not discriminate on the basis of race, color, national origin, age, disability, sex, sexual orientation, or gender identity.            Thank you!     Thank you for choosing [facility]  for your care. Our goal is always to provide you with excellent care. Hearing back from our patients is one way we can continue to improve our services. Please take a few minutes to complete the written survey that you may receive in the mail after your visit with us. Thank you!             Your Updated Medication List - Protect others around you: Learn how to safely use, store and throw away your medicines at www.disposemymeds.org.          This list is accurate as of 5/4/18  9:16 AM.  Always use your most recent med list.                   Brand Name Dispense Instructions for use Diagnosis    LORazepam 0.5 MG tablet    ATIVAN    10 tablet    Take 1  tablet (0.5 mg) by mouth every 8 hours as needed for anxiety        paliperidone 156 MG/ML Susp injection    INVEGA SUSTENNA    1 mL    Inject 1 mL (156 mg) into the muscle once for 1 dose        paliperidone 3 MG 24 hr tablet    INVEGA    6 tablet    Use 1 tab daily may use 2 tabs daily if needed        sertraline 100 MG tablet    ZOLOFT    45 tablet    Take 1.5 tablets (150 mg) by mouth daily    Depression, unspecified depression type       ULTRA OMEGA 3 1000 MG Caps     120 capsule    Take 2 capsules by mouth 2 times daily Nordic Naturals Ultimate Omega Brand. 1.1g EPA+DHA per 2 capsules. Pt to take 1.1g BID

## 2018-05-04 NOTE — MR AVS SNAPSHOT
After Visit Summary   5/4/2018    Jasen Guillen III    MRN: [id number]           Patient Information     Date Of Birth          1995        Visit Information        Provider Department      5/4/2018 8:30 AM Lynnette Rios MD Psychiatry Clinic        Today's Diagnoses     Schizophrenia, unspecified type (H)    -  1    Major depression in complete remission (H)           Follow-ups after your visit        Your next 10 appointments already scheduled     May 25, 2018 10:00 AM CDT   Nurse Visit with [facility] Psychiatry Nurse   Psychiatry Clinic ([facility])    [place] [id number] [phone]            Roland 15, 2018  8:15 AM CDT   First Episode Return with Lynnette Rios MD   Psychiatry Clinic ([facility])    [place] [id number] [address] [phone]              Who to contact     Please call your clinic at [phone] to:    Ask questions about your health    Make or cancel appointments    Discuss your medicines    Learn about your test results    Speak to your doctor            Additional Information About Your Visit        [employer] Information     [employer] gives you secure access to your electronic health record. If you see a primary care provider, you can also send messages to your care team and make appointments. If you have questions, please call your primary care clinic.  If you do not have a primary care provider, please call [phone] and they will assist you.      [employer] is an electronic gateway that provides easy, online access to your medical records. With [employer], you can request a clinic appointment, read your test results, renew a prescription or communicate with your care team.     To access your existing account, please contact your [facility] or call [phone] for assistance.        Care  EveryWhere ID     This is your Care EveryWhere ID. This could be used by other organizations to access your [place] medical records  [id number]        Your Vitals Were     Pulse                   78            Blood Pressure from Last 3 Encounters:   05/04/18 110/74   05/01/18 121/72   04/06/18 95/67    Weight from Last 3 Encounters:   05/04/18 85.2 kg (187 lb 12.8 oz)   05/01/18 85.3 kg (188 lb)   04/06/18 85.3 kg (188 lb)              Today, you had the following     No orders found for display       Primary Care Provider Fax #    Physician No Ref-Primary [phone]       No address on file        Equal Access to Services     JENNIFER CASTILLO : Giovany Ruby, apolinar bazzi, shelly russell, adrian guallpa . So [facility] [phone].    ATENCIÓN: Si habla español, tiene a ventura disposición servicios gratuitos de asistencia lingüística. Llame al [phone].    We comply with applicable federal civil rights laws and [place] laws. We do not discriminate on the basis of race, color, national origin, age, disability, sex, sexual orientation, or gender identity.            Thank you!     Thank you for choosing PSYCHIATRY CLINIC  for your care. Our goal is always to provide you with excellent care. Hearing back from our patients is one way we can continue to improve our services. Please take a few minutes to complete the written survey that you may receive in the mail after your visit with us. Thank you!             Your Updated Medication List - Protect others around you: Learn how to safely use, store and throw away your medicines at www.disposemymeds.org.          This list is accurate as of 5/4/18  1:15 PM.  Always use your most recent med list.                   Brand Name Dispense Instructions for use Diagnosis    LORazepam 0.5 MG tablet    ATIVAN    10 tablet    Take 1 tablet (0.5 mg) by mouth every 8 hours as needed for anxiety        paliperidone 156 MG/ML Susp  injection    INVEGA SUSTENNA    1 mL    Inject 1 mL (156 mg) into the muscle once for 1 dose        paliperidone 3 MG 24 hr tablet    INVEGA    6 tablet    Use 1 tab daily may use 2 tabs daily if needed        sertraline 100 MG tablet    ZOLOFT    45 tablet    Take 1.5 tablets (150 mg) by mouth daily    Depression, unspecified depression type       ULTRA OMEGA 3 1000 MG Caps     120 capsule    Take 2 capsules by mouth 2 times daily Nordic Naturals Ultimate Omega Brand. 1.1g EPA+DHA per 2 capsules. Pt to take 1.1g BID

## 2018-05-04 NOTE — NURSING NOTE
Chief Complaint   Patient presents with     Recheck Medication     Schizophrenia, unspecified type

## 2018-05-04 NOTE — PROGRESS NOTES
"  PSYCHIATRY CLINIC PROGRESS NOTE     CC:  \"I haven't had any further abnormal noises or movements.\"    INTERIM HISTORY                                                   Jasen Guillen III is a 21 year old male with a diagnosis of schizophrenia (for past ~ 2 years) who comes in for an urgent care visit today.    Patient visited the ED 4 days ago due to the following sx:    \"Patient notes that about 3:00 this afternoon he began to have strange noises coming from his voice states that his voice changes rapidly and especially when he is \"thinking about it\". He also felt compelled to do repetitive scratching of his left cheek.  Patient denies any illicit drug use denies any other medical concerns and notes that he does not have any suicidal or homicidal ideation.\"    Patient had been receiving paliperidone injections, 234 mg q month, reduced to 156 mg one week ago.    Pt received lorazapem 0.5 mg po in the ED< and noted immediate relief of the tic-like sx.  He was discharged home with several lorazepam tabs and several oral invega tabs, as per my instructions to the Ed physician.    Pt is also taking sertraline recently increased from 100 to 150 mg po qam for depressive and anxiety sx that had recurred.    Since the Ed visit a few days ago, pt reports all has gone well. No recurrence of tic-like sx, scratching, vocalizations.  Sleeping well.  Classes are also going well, and feels ready for exams.      PSYCH ROS:    Positive Psychotic symptoms:      Recurrence of ideas of reference that occurred about 2 months ago have continued completely resolved.  No IOR.  Does not report any feelings of paranoia or being in danger.  Denies hallucinations or disorganized thinking.     Negative psychotic symptoms:    Anhedonia and amotivation are resolved.  Patient engaging in activities with friends at school (such as swing dancing on Thursday evenings)    Mood symptoms:     Sx of depression and anxiety are resolved on higher dose " of sertraline, except for  Brief episode of anxiety earlier this week during ED visit, that responded well to lorazepam..  Energy level normal.   No feelings of hopelessness or of self-harm. No SI.    Cognition:    Attention and concentration better.    Substance use: Not present.    Obsessive-compulsive symptoms: Not present    MEDICAL ROS:       Constitutional: No fever, fatigue.  Has had some weight loss due to eating less, but this is stable.    Neurologic: Denies problems with salivation, muscle stiffness, motor restlessness. Eye spasms have resolved. Denies all abnormal motor movements since episode a few days ago.  No dizziness.    HEENT: Mild dry mouth. No dry eyes.    Cardiovascular: No chest pain or palpitations.    Respiratory: No shortness of breath, dyspnea on exertion, or wheezing.    Gastrointestinal: No nausea or constipation     : No symptoms reported    Musculoskeletal:No musculoskeletal difficulties.    PSYCHOSOCIAL HX:    Will stay here over the summer and take classes.  Has a close relationship with friends in [employer] group-- is able to share his personal hx with them and get support.      PAST PSYCH HISTORY:    Received intensive treatment 2 years ago for an enduring episode of psychosis that was characterized predominantly by ideas of reference (feeling that he was being talked about insolvency would listen to over the Internet) he also experienced disorganized thinking and disorganized behavior.    Of note, this patient's past andrecent presentation has been characterized by the presence of mild chronic dyskinetic movements, now much improved.    ALLERGY                                Review of patient's allergies indicates no known allergies.  MEDICATIONS                               Paliperidone 156 m IM q 28 days  Sertraline 100 mg po qd  Lorazepam 0.5 mg po prn reappearance of tics or vocalizations.     VITALS     /74  Pulse 78  Wt 85.2 kg (187 lb 12.8 oz)  MENTAL STATUS EXAM        "                                                      Orientation:  x4  Gait and Station:  Walks smoothly in squires, normal arm swing, normal gait.  No UE stifness.  Very mild fleeting dyskinetic movements observable in face and hands. No vocalizations. No scratching. No masked facies.  Alertness: Alert, oriented.  Appearance: Neatly and casually dressed, carrying backpack.  Behavior/Demeanor: Calm, cooperative, pleasant, with excellent eye contact.  Appropriately interactive.  Speech: Normal rate and rhythm, normal volume.  Language: Language is cogent, descriptive of his activities  Psychomotor: Normal PM speed. No slowing observed.   Mood: \"very good.\"  Affect:Full range, expressive, appropriate to content.  Thought Process/Associations: Linear, logical.  Thought Content: Denies delusional thoughts, ideas of reference, SI. HI.    Perception:  Denies hallucinations. No illusions.  Insight: Excellent.  Judgment: Excellent  Cognition:  Intact  Recent and Remote Memory: Intact  Attention Span and Concentration:  Intact during exam  Fund of Knowledge:excellent        DIAGNOSIS     Schizophrenia, doing very well with no overt sx.  Recent very brief episode of tic-like sx that resolved well with a single dose of 0.5 mg lorazepam.  Major depression, in remission.  Extrapyramidal side effects (oculogyrc spasms), resolved spontanesouly, no longer on Cogentin.     ASSESSMENT                                     Schizophrenia, in remission,doing well on current lower dose of invega injectable, though recent tic-like sx may be an indication of basal ganglia or motor strip dysfunction unmasked by the lowering of the D2 blocking agent.    Brief episode of Tic-like vocalizations and scratching behavior resolved. Does not warrant anyy further eval or treatment at present. Will continue to observe and assess.    In terms of the depressive symptoms, the patient experienced some recurrence of sx a few months ago, but these have " responded well to increase in sertraline 150 mg.  No further sx at present.    Also of note, his prior negative symptoms of lack of motivation, lack of energy, and anhedonia, now are resolved. The patient reports that he is experiencing strong motivation and enjoyment.    Cognitive symptoms:Cognition is improved based on subjective self-report (patient recently participated in cognitive training study).       PLAN                                                                                                       1) Positive Psychotic Sx:   Continue on current regimen of reduced dose of injectable paliperidone 156 mg po q month, received this one week ago (firs lowered dose).                Use oral paliperidone, 3 mg po qd (up to 6 mg po qd) if needed for any recurrence of positive psychotic sx.               No longer requiring Cogentin for EPS.              Initiate omega-3 essential fatty acids at next visit-- recommended [employer] 2 caps po bid. Pt to buy online.    2) Negative psychotic symptoms:      Continue to support involvement in social activities on campus-- patient very active in [employer] group.  Patient also participating in young adults group.               Will continue activities over the summer.    3) Mood and anxiety symptoms:   Continue sertraline at increased dose of 150 mg po qd.    4) Brief episode of tic-like sx:               Pt to monitor. Has lorazepam tablets to use prn if these recur.    4)  RTC 3 weeks        TREATMENT RISK STATEMENT:  The risks, benefits, alternatives and potential adverse effects have been discussed and are understood by the patient/ patient's guardian. The pt understands the risks of using street drugs or alcohol.  There are no medical contraindications, the pt agrees to treatment with the ability to do so.  The patient understands to call 911 or come to the nearest ED if life threatening or urgent symptoms present.    PSYCHIATRY CLINIC INDIVIDUAL PSYCHOTHERAPY  "NOTE   Length of session: 20 minutes, 8:45 to 9;05  Date of most recent treatment plan:today      Subjective: \"Things are going much better.\"   Plan:    Continue supportive psychotherapy during monthly medication management visits.   Continue [employer] Group    Psychotherapy services during this visit included  myself and Jasen Guillen.  Issues addressed in therapy included: how to manage observing his sx when they recur, and feeling  Prepared for a trial of a reduced dose of medication   and were addressed using primarily supportive and CBT techniques.        TREATMENT  PLAN          SYMPTOMS;PROBLEMS   MEASURABLE GOALS;    FUNCTIONAL IMPROVEMENT INTERVENTIONS;    GAINS MADE DISCHARGE CRITERIA   Low energy, lack of motivation Patient will experience a significant improvement in energy and motivation, and will be able to engage in outsidesocializing activities as well as classwork Supportive Psychotherapy once per month    Patient enjoying active social life Ongoing engagement with social activities   Ideas of reference during times of stress Patient will no longer experience ideas of reference Monthly injectable paliperidone  CBT techniques  IOR improved after recent flare-up Two years without symptoms, followed by two years of monitoring without symptoms relapse   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      No tics       "

## 2018-05-04 NOTE — PROGRESS NOTES
Clinical Consult:                                                    Jasen Guillen III is a 22 year old male coming in for a clinical pharmacist consult.  He was referred to me from Dr. Rios. Dr. Rios present at end of meeting to discuss plan.     Reason for Consult: involuntary voice changes and scratching after reducing Invega Sustenna from 234mg to 156mg Q4 weeks.     Discussion: Pt has been receiving Invega Sustenna 234mg Q4 weeks for the last several months. Dose was reduced to 156mg at last injection on 4/27/18.  Pt reports on Tuesday 5/1/18, he started having uncontrollable experiences of making strange noises (whining, grunting, pitch changes) and scratching of his face.  He presented to the ED on 5/1 and was given 1 dose of Ativan 0.5mg. He states as soon has he swallowed the tablet the noises and scratches stopped.  He was sent home from the ED with #10 Ativan 0.5mg tablets and #6 Invega 3mg tablets with instructions to take PRN.    He had a minor recurrence of making noises on Wednesday 5/2, but they resolved quickly without medication. He has not taken any doses of PRN Ativan or Invega since ED discharge. He denies psychosis symptoms and his mood is good. He has 1 final exam for school next week and feels prepared for this.       Plan:  1. No medication changes today. Gato instructed to take Ativan PRN if noises/scratching reoccur.  Also advised that he can take Invega 3-6mg PRN if psychosis symptoms emerge. Advised Gato to call clinic with reemergence of symptoms. Pt agreeable with plan.   2. Next Invega injection and appt with Dr. Rios is 5/25/18    Sarah Peraza, PharmD, BCPP  Medication Therapy Management Pharmacist  [facility]  [phone]      I agree with the recommendations documented above.

## 2018-05-25 ENCOUNTER — ALLIED HEALTH/NURSE VISIT (OUTPATIENT)
Dept: PSYCHIATRY | Facility: CLINIC | Age: 23
End: 2018-05-25
Attending: PSYCHIATRY & NEUROLOGY
Payer: COMMERCIAL

## 2018-05-25 DIAGNOSIS — F20.9 SCHIZOPHRENIA, UNSPECIFIED TYPE (H): Primary | ICD-10-CM

## 2018-05-25 PROCEDURE — 96372 THER/PROPH/DIAG INJ SC/IM: CPT | Mod: ZF

## 2018-05-25 PROCEDURE — 25000128 H RX IP 250 OP 636: Mod: ZF

## 2018-05-25 NOTE — NURSING NOTE
"Jasen Wilmer,  1995, presented to the clinic today at the request of Dr. Lynnette Rios,  ordering provider for long-acting injectable Invega Sustenna 156 mg.    OBSERVATIONS:  1.  Appearance: Well groomed, good body hygiene. Clean shirt, pants, and shoes. Pt also carrying full backpack for classes later today. Pt stated \"I graduate spring of 2019 and I'm looking forward to finishing school.\"  2.  Mood: Noticeably in a good mood. Pt looking forward to long holiday weekend.   3.  Affect: Full  4.  Attitude: Engaged, friendly  5.  Cooperation: Full  6.  Side Effects: Pt stated \"A few weeks after my injection, I started hearing voices and they put me on Lorazepam as needed.\" Writer asked pt if he had to use any Lorazepam since, pt denied taking any additional doses. Pt stated I'm doing good\".  7.  Education: If needed, use PRN Lorazepam as prescribed .  8. Next appointment: 4 weeks,  at noon     The service provided today was rendered under the supervising provider of the day, Dr. Schneider, who was available for consultation as needed.    "

## 2018-05-25 NOTE — MR AVS SNAPSHOT
After Visit Summary   5/25/2018    Jasen Guillen III    MRN: [id number]           Patient Information     Date Of Birth          1995        Visit Information        Provider Department      5/25/2018 10:00 AM Nurse, [facility]        Today's Diagnoses     Schizophrenia, unspecified type (H)    -  1       Follow-ups after your visit        Follow-up notes from your care team     Return in 4 weeks (on 6/22/2018) for Med. Management.      Your next 10 appointments already scheduled     Roland 15, 2018  8:15 AM CDT   First Episode Return with Lynnette Rios MD   Psychiatry Clinic ([facility])    [place] [id number] [address] [phone]            Jun 22, 2018 12:00 PM CDT   Nurse Visit with [facility] Psychiatry Nurse   Psychiatry Clinic ([facility])    [place] [id number] [address] [address] [phone]              Who to contact     Please call your clinic at [phone] to:    Ask questions about your health    Make or cancel appointments    Discuss your medicines    Learn about your test results    Speak to your doctor            Additional Information About Your Visit        [employer] Information     [employer] gives you secure access to your electronic health record. If you see a primary care provider, you can also send messages to your care team and make appointments. If you have questions, please call your primary care clinic.  If you do not have a primary care provider, please call [phone] and they will assist you.      [employer] is an electronic gateway that provides easy, online access to your medical records. With [employer], you can request a clinic appointment, read your test results, renew a prescription or communicate with your care team.     To access your existing account, please contact your [facility] or  call [phone] for assistance.        Care EveryWhere ID     This is your Care EveryWhere ID. This could be used by other organizations to access your [facility] medical records  [id number]         Blood Pressure from Last 3 Encounters:   05/04/18 110/74   05/01/18 121/72   04/06/18 95/67    Weight from Last 3 Encounters:   05/04/18 85.2 kg (187 lb 12.8 oz)   05/01/18 85.3 kg (188 lb)   04/06/18 85.3 kg (188 lb)              Today, you had the following     No orders found for display       Primary Care Provider Fax #    Physician No Ref-Primary [phone]       No address on file        Equal Access to Services     JENNIFER CASTILLO : Giovany Ruby, apolinar bazzi, shelly russell, adrian guallpa . So [facility] [place] [phone].    ATENCIÓN: Si habla español, tiene a ventura disposición servicios gratuitos de asistencia lingüística. Llame al [phone].    We comply with applicable federal civil rights laws and [place] laws. We do not discriminate on the basis of race, color, national origin, age, disability, sex, sexual orientation, or gender identity.            Thank you!     Thank you for choosing PSYCHIATRY CLINIC  for your care. Our goal is always to provide you with excellent care. Hearing back from our patients is one way we can continue to improve our services. Please take a few minutes to complete the written survey that you may receive in the mail after your visit with us. Thank you!             Your Updated Medication List - Protect others around you: Learn how to safely use, store and throw away your medicines at www.disposemymeds.org.          This list is accurate as of 5/25/18  1:42 PM.  Always use your most recent med list.                   Brand Name Dispense Instructions for use Diagnosis    LORazepam 0.5 MG tablet    ATIVAN    10 tablet    Take 1 tablet (0.5 mg) by mouth every 8 hours as needed for anxiety        paliperidone 156 MG/ML Susp injection     INVEGA SUSTENNA    1 mL    Inject 1 mL (156 mg) into the muscle once for 1 dose        paliperidone 3 MG 24 hr tablet    INVEGA    6 tablet    Use 1 tab daily may use 2 tabs daily if needed        sertraline 100 MG tablet    ZOLOFT    45 tablet    Take 1.5 tablets (150 mg) by mouth daily    Depression, unspecified depression type       ULTRA OMEGA 3 1000 MG Caps     120 capsule    Take 2 capsules by mouth 2 times daily Nordic Naturals Ultimate Omega Brand. 1.1g EPA+DHA per 2 capsules. Pt to take 1.1g BID

## 2018-06-15 ENCOUNTER — OFFICE VISIT (OUTPATIENT)
Dept: PSYCHIATRY | Facility: CLINIC | Age: 23
End: 2018-06-15
Attending: PSYCHIATRY & NEUROLOGY
Payer: COMMERCIAL

## 2018-06-15 VITALS — DIASTOLIC BLOOD PRESSURE: 66 MMHG | HEART RATE: 86 BPM | SYSTOLIC BLOOD PRESSURE: 116 MMHG | WEIGHT: 190.2 LBS

## 2018-06-15 DIAGNOSIS — F20.0 PARANOID SCHIZOPHRENIA (H): Primary | ICD-10-CM

## 2018-06-15 DIAGNOSIS — F32.5 MAJOR DEPRESSION IN COMPLETE REMISSION (H): ICD-10-CM

## 2018-06-15 PROCEDURE — G0463 HOSPITAL OUTPT CLINIC VISIT: HCPCS | Mod: ZF

## 2018-06-15 ASSESSMENT — PAIN SCALES - GENERAL: PAINLEVEL: NO PAIN (0)

## 2018-06-15 NOTE — PROGRESS NOTES
"  PSYCHIATRY CLINIC PROGRESS NOTE     CC:  \"Things are going really well.\"    INTERIM HISTORY                                                   Jasen Guillen III is a 21 year old male with a diagnosis of schizophrenia (for past ~ 2.5 years) who comes in for a scheduled appointment today.    Patient had been receiving paliperidone injections, 234 mg q month, reduced to 156 mg 5 weeks ago. Feels that he has tolerated this reduction well, with only several brief periods of motor restlessness kenny resolved spontaneously. No ocular movements.    Pt had had an ED visit about a week after the initial reduction, for the following sx:  Patient notes that about 3:00 this afternoon he began to have strange noises coming from his voice states that his voice changes rapidly and especially when he is \"thinking about it\". He also felt compelled to do repetitive scratching of his left cheek.    He received lorazapem 0.5 mg po in the ED and noted immediate relief of the tic-like sx.  He was discharged home with several lorazepam tabs and several oral invega tabs, as per my instructions to the ED physician. There have been no further episodes like this.    Pt was taking sertraline 150 mg po qam for depressive and anxiety sx, but these have resolved over the past 4-6 months.  Now when he runs out of meds, and does not immediately refill them, he has gone for a week without the sertraline and has felt \"perfectly fine.\" Would like to try and d/c this.    In general, things are going \"very well\".  Sleeping well, eating well, getting regular exercise. Is taking summer classes and enjoying them.    PSYCH ROS:    Positive Psychotic symptoms:      Recurrence of ideas of reference that occurred about 3 months ago have continued completely resolved.  No IOR.  Does not report any feelings of paranoia or being in danger.  Denies hallucinations or disorganized thinking.     Negative psychotic symptoms:    Anhedonia and amotivation are " "resolved.  Patient engaging in activities with friends at school (such as swing dancing on Thursday evenings). IN fact, he feels \"even better\" on lower dose of invega.    Mood symptoms:     Sx of depression and anxiety are now completely resolved and have not recurred even when stopping sertraline for up to a week. Energy level normal.   No feelings of hopelessness or of self-harm. No SI.    Cognition:    Attention and concentration better. Doing well in classes.    Substance use: Not present.    Obsessive-compulsive symptoms: Not present    MEDICAL ROS:       Constitutional: No fever, fatigue.  Has had some weight loss due to eating less, but this is stable.    Neurologic: Denies problems with salivation, muscle stiffness, motor restlessness. Eye spasms have resolved. Denies all abnormal motor movements since episode over a month ago.  No dizziness.    HEENT: Mild dry mouth. No dry eyes.    Cardiovascular: No chest pain or palpitations.    Respiratory: No shortness of breath, dyspnea on exertion, or wheezing.    Gastrointestinal: No nausea or constipation     : No symptoms reported    Musculoskeletal:No musculoskeletal difficulties.    Skin: WNL.    Endocrine: No abnormalities.    PSYCHOSOCIAL HX:    Will stay here over the summer and take classes. Will do a family vacation at the end of summer term.  Has a close relationship with friends in [employer] group-- is able to share his personal hx with them and get support.      PAST PSYCH HISTORY:    Received intensive treatment 2 years ago for an enduring episode of psychosis that was characterized predominantly by ideas of reference (feeling that he was being talked about insolvency would listen to over the Internet) he also experienced disorganized thinking and disorganized behavior.    Of note, this patient's past andrecent presentation has been characterized by the presence of mild chronic dyskinetic movements, now much improved.    ALLERGY                              " "  Review of patient's allergies indicates no known allergies.  MEDICATIONS                               Paliperidone 156 m IM q 28 days  Sertraline 150 mg po qd-- would like to d/c  Lorazepam 0.5 mg po prn reappearance of tics or vocalizations. Has not used.     VITALS     /66  Pulse 86  Wt 86.3 kg (190 lb 3.2 oz)  MENTAL STATUS EXAM                                                             Orientation:  x4  Gait and Station:  Walks smoothly in squires, normal arm swing, normal gait.  No UE stifness.  Very mild fleeting dyskinetic movements observable in face and hands. No vocalizations. No scratching. No masked facies.  Alertness: Alert, oriented.  Appearance: Neatly and casually dressed.  Behavior/Demeanor: Calm, cooperative, pleasant, with excellent eye contact.  Appropriately interactive.  Speech: Normal rate and rhythm, normal volume.  Language: Language is cogent, descriptive of his activities  Psychomotor: Normal PM speed. No slowing observed.   Mood: \"very good.\"  Affect:Full range, expressive, appropriate to content. SHows sense of humor-- smiles and uses some humor.  Thought Process/Associations: Linear, logical.  Thought Content: Denies delusional thoughts, ideas of reference, SI. HI.    Perception:  Denies hallucinations. No illusions.  Insight: Excellent.  Judgment: Excellent  Cognition:  Intact  Recent and Remote Memory: Intact  Attention Span and Concentration:  Intact during exam  Fund of Knowledge:excellent        DIAGNOSIS     Schizophrenia, doing very well with no overt sx.  Brief episode of tic-like sx about 5 weeks ago that resolved well with a single dose of 0.5 mg lorazepam with no further recurrence.   Major depression, in remission for past 4-6 months.  Extrapyramidal side effects (oculogyrc spasms), resolved spontanesouly, no longer on Cogentin.     ASSESSMENT                                     Schizophrenia, in remission,doing well on current lower dose of invega injectable, with " no recurrence of tic-like sx or other basal ganglia or motor strip dysfunction unmasked by the lowering of the D2 blocking agent except possibly for some mild restlessness in LEs.  Will continue to observe and assess.    In terms of the depressive symptoms, the patient experienced some recurrence of sx about 6 months ago, these have been in remission for over 4-6 months.  Pt would like to do trial of going without sertraline.    Also of note, his prior negative symptoms of lack of motivation, lack of energy, and anhedonia, now are resolved, especially on lower dose of medication. The patient reports that he is experiencing strong motivation and enjoyment.    Cognitive symptoms:Cognition is improved based on subjective self-report (patient recently participated in cognitive training study).       PLAN                                                                                                       1) Positive Psychotic Sx:   Continue on current regimen of reduced dose of injectable paliperidone 156 mg po q month, now has been on it 5 weeks.                Can Use oral paliperidone, 3 mg po qd (up to 6 mg po qd) if needed for any recurrence of positive psychotic sx. Has not needed this at all so far.              No longer requiring Cogentin for EPS.             Pt has not yet started omega-3 essential fatty acids but plans to do so.    2) Negative psychotic symptoms:      Continue to support involvement in social activities on campus-- patient very active in [employer] group.  Patient also participating in young adults group.               Will continue activities over the summer.    3) Mood and anxiety symptoms:   Resolved. Will d/c sertraline as pt has done well for up to a week at a time without this.    4) Brief episode of tic-like sx:   No further episodes for past 8 + weeks.             Pt to monitor. Has lorazepam tablets to use prn if these recur.    5)  RTC 7 weeks        TREATMENT RISK STATEMENT:  The risks,  "benefits, alternatives and potential adverse effects have been discussed and are understood by the patient/ patient's guardian. The pt understands the risks of using street drugs or alcohol.  There are no medical contraindications, the pt agrees to treatment with the ability to do so.  The patient understands to call 911 or come to the nearest ED if life threatening or urgent symptoms present.    PSYCHIATRY CLINIC INDIVIDUAL PSYCHOTHERAPY NOTE   Length of session: 20 minutes, 8:20 to 8:40.    Date of most recent treatment plan:today      Subjective: \"Things are good\"   Plan:    Continue supportive psychotherapy during monthly medication management visits.   Continue [employer]    Psychotherapy services during this visit included  myself and Jasen Guillen.  Issues addressed in therapy included: how to watch for recurrent of depressive sx.  How to make sure he remains involvedi n social activities despite doing coursework this summer. Addressed using primarily supportive and CBT techniques.        TREATMENT  PLAN          SYMPTOMS;PROBLEMS   MEASURABLE GOALS;    FUNCTIONAL IMPROVEMENT INTERVENTIONS;    GAINS MADE DISCHARGE CRITERIA   Low energy, lack of motivation Patient will experience a significant improvement in energy and motivation, and will be able to engage in outsidesocializing activities as well as classwork Supportive Psychotherapy once per month    Patient enjoying active social life Ongoing engagement with social activities   Ideas of reference during times of stress Patient will no longer experience ideas of reference Monthly injectable paliperidone  CBT techniques  IOR improved after recent flare-up Two years without symptoms, followed by two years of monitoring without symptoms relapse   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      No tics

## 2018-06-15 NOTE — MR AVS SNAPSHOT
After Visit Summary   6/15/2018    Jasen Guillen III    MRN: [id number]           Patient Information     Date Of Birth          1995        Visit Information        Provider Department      6/15/2018 8:15 AM Lynnette Rios MD Psychiatry Clinic        Today's Diagnoses     Paranoid schizophrenia (H)    -  1    Major depression in complete remission (H)           Follow-ups after your visit        Your next 10 appointments already scheduled     Jun 22, 2018 12:00 PM CDT   Nurse Visit with [facility] Psychiatry Nurse   Psychiatry Clinic ([facility])    [place] [id number] [id number] [phone]            Jul 27, 2018  8:15 AM CDT   First Episode Return with Lynnette Rios MD   Psychiatry Clinic ([facility])    [place] [id number] [address] [address] [phone]              Who to contact     Please call your clinic at [phone] to:    Ask questions about your health    Make or cancel appointments    Discuss your medicines    Learn about your test results    Speak to your doctor            Additional Information About Your Visit        MyChart Information     [email or web address] gives you secure access to your electronic health record. If you see a primary care provider, you can also send messages to your care team and make appointments. If you have questions, please call your primary care clinic.  If you do not have a primary care provider, please call [phone] and they will assist you.      [email or web address] is an electronic gateway that provides easy, online access to your medical records. With [email or web address], you can request a clinic appointment, read your test results, renew a prescription or communicate with your care team.     To access your existing account, please contact your [facility] or call [phone] for assistance.        Care EveryWhere ID      This is your Care EveryWhere ID. This could be used by other organizations to access your [facility] medical records  [id number]        Your Vitals Were     Pulse                   86            Blood Pressure from Last 3 Encounters:   06/15/18 116/66   05/04/18 110/74   05/01/18 121/72    Weight from Last 3 Encounters:   06/15/18 86.3 kg (190 lb 3.2 oz)   05/04/18 85.2 kg (187 lb 12.8 oz)   05/01/18 85.3 kg (188 lb)              Today, you had the following     No orders found for display       Primary Care Provider Fax #    Physician No Ref-Primary [phone]       No address on file        Equal Access to Services     JENNIFER CASTILLO : Giovany Ruby, apolinar bazzi, shelly russell, adrian guallpa . So [facility] [phone].    ATENCIÓN: Si habla español, tiene a ventura disposición servicios gratuitos de asistencia lingüística. Llame al [phone].    We comply with applicable federal civil rights laws and [place] laws. We do not discriminate on the basis of race, color, national origin, age, disability, sex, sexual orientation, or gender identity.            Thank you!     Thank you for choosing PSYCHIATRY CLINIC  for your care. Our goal is always to provide you with excellent care. Hearing back from our patients is one way we can continue to improve our services. Please take a few minutes to complete the written survey that you may receive in the mail after your visit with us. Thank you!             Your Updated Medication List - Protect others around you: Learn how to safely use, store and throw away your medicines at www.disposemymeds.org.          This list is accurate as of 6/15/18  9:41 AM.  Always use your most recent med list.                   Brand Name Dispense Instructions for use Diagnosis    LORazepam 0.5 MG tablet    ATIVAN    10 tablet    Take 1 tablet (0.5 mg) by mouth every 8 hours as needed for anxiety        paliperidone 156 MG/ML Susp  injection    INVEGA SUSTENNA    1 mL    Inject 1 mL (156 mg) into the muscle once for 1 dose        paliperidone 3 MG 24 hr tablet    INVEGA    6 tablet    Use 1 tab daily may use 2 tabs daily if needed        sertraline 100 MG tablet    ZOLOFT    45 tablet    Take 1.5 tablets (150 mg) by mouth daily    Depression, unspecified depression type       ULTRA OMEGA 3 1000 MG Caps     120 capsule    Take 2 capsules by mouth 2 times daily Nordic Naturals Ultimate Omega Brand. 1.1g EPA+DHA per 2 capsules. Pt to take 1.1g BID

## 2018-06-22 ENCOUNTER — ALLIED HEALTH/NURSE VISIT (OUTPATIENT)
Dept: PSYCHIATRY | Facility: CLINIC | Age: 23
End: 2018-06-22
Attending: PSYCHIATRY & NEUROLOGY
Payer: COMMERCIAL

## 2018-06-22 DIAGNOSIS — F20.9 SCHIZOPHRENIA, UNSPECIFIED TYPE (H): Primary | ICD-10-CM

## 2018-06-22 PROCEDURE — 96372 THER/PROPH/DIAG INJ SC/IM: CPT | Mod: ZF

## 2018-06-22 PROCEDURE — 25000128 H RX IP 250 OP 636: Mod: ZF

## 2018-06-22 NOTE — NURSING NOTE
"Jasen Guillen,  1995, presented to the clinic today at the request of Dr. Rios,  ordering provider for long-acting injectable Invega Sustenna 156 mg.    OBSERVATIONS:  1.  Appearance: Casually dressed, weather appropriate, clean shorts, polo shirt, and backpack.   2.  Mood: Good, Pt thanked this writer regarding change in his injection time, pt stated \"I just couldn't get out of school any earlier.\"  3.  Affect: Congruent  4.  Attitude: Pleasant, friendly  5.  Cooperation: Full  6.  Side Effects: Denied  7.  Education: Ice at injection site for any pain.  8. Next appointment: 2018 at 1430    The service provided today was rendered under the supervising provider of the day, Dr. Schneider, who was available for consultation as needed.    "

## 2018-07-20 ENCOUNTER — ALLIED HEALTH/NURSE VISIT (OUTPATIENT)
Dept: PSYCHIATRY | Facility: CLINIC | Age: 23
End: 2018-07-20
Attending: PSYCHIATRY & NEUROLOGY
Payer: COMMERCIAL

## 2018-07-20 ENCOUNTER — OFFICE VISIT (OUTPATIENT)
Dept: PSYCHIATRY | Facility: CLINIC | Age: 23
End: 2018-07-20
Attending: PSYCHOLOGIST
Payer: COMMERCIAL

## 2018-07-20 DIAGNOSIS — F20.9 SCHIZOPHRENIA, UNSPECIFIED TYPE (H): Primary | ICD-10-CM

## 2018-07-20 DIAGNOSIS — F29 PSYCHOSIS, UNSPECIFIED PSYCHOSIS TYPE (H): Primary | ICD-10-CM

## 2018-07-20 PROCEDURE — 25000128 H RX IP 250 OP 636: Mod: ZF

## 2018-07-20 PROCEDURE — 96372 THER/PROPH/DIAG INJ SC/IM: CPT | Mod: ZF

## 2018-07-20 NOTE — MR AVS SNAPSHOT
After Visit Summary   7/20/2018    Jasen Guillen III    MRN: [id number]           Patient Information     Date Of Birth          1995        Visit Information        Provider Department      7/20/2018 3:00 PM Sera Landrum, PhD Psychiatry Clinic        Today's Diagnoses     Psychosis, unspecified psychosis type    -  1       Follow-ups after your visit        Who to contact     Please call your clinic at [phone] to:    Ask questions about your health    Make or cancel appointments    Discuss your medicines    Learn about your test results    Speak to your doctor            Additional Information About Your Visit        MyChart Information     CasaHop gives you secure access to your electronic health record. If you see a primary care provider, you can also send messages to your care team and make appointments. If you have questions, please call your primary care clinic.  If you do not have a primary care provider, please call [phone] and they will assist you.      CasaHop is an electronic gateway that provides easy, online access to your medical records. With [employer], you can request a clinic appointment, read your test results, renew a prescription or communicate with your care team.     To access your existing account, please contact your [facility] or call [phone] for assistance.        Care EveryWhere ID     This is your Care EveryWhere ID. This could be used by other organizations to access your [place] medical records  [id number]         Blood Pressure from Last 3 Encounters:   06/15/18 116/66   05/04/18 110/74   05/01/18 121/72    Weight from Last 3 Encounters:   06/15/18 86.3 kg (190 lb 3.2 oz)   05/04/18 85.2 kg (187 lb 12.8 oz)   05/01/18 85.3 kg (188 lb)              Today, you had the following     No orders found for display       Primary Care Provider Fax #    Physician No Ref-Primary [phone]       No address on file         Equal Access to Services     [facility] : Hadii aad ku hadolivabernard Joniali, wachaparroda luqadaha, qaybta kakelseyadrian parsons. So [facility] [phone].    ATENCIÓN: Si habla español, tiene a ventura disposición servicios gratuitos de asistencia lingüística. Madelyname al [phone].    We comply with applicable federal civil rights laws and [place] laws. We do not discriminate on the basis of race, color, national origin, age, disability, sex, sexual orientation, or gender identity.            Thank you!     Thank you for choosing PSYCHIATRY CLINIC  for your care. Our goal is always to provide you with excellent care. Hearing back from our patients is one way we can continue to improve our services. Please take a few minutes to complete the written survey that you may receive in the mail after your visit with us. Thank you!             Your Updated Medication List - Protect others around you: Learn how to safely use, store and throw away your medicines at www.disposemymeds.org.          This list is accurate as of 7/20/18 11:59 PM.  Always use your most recent med list.                   Brand Name Dispense Instructions for use Diagnosis    LORazepam 0.5 MG tablet    ATIVAN    10 tablet    Take 1 tablet (0.5 mg) by mouth every 8 hours as needed for anxiety        paliperidone 156 MG/ML Susp injection    INVEGA SUSTENNA    1 mL    Inject 1 mL (156 mg) into the muscle once for 1 dose        paliperidone 3 MG 24 hr tablet    INVEGA    6 tablet    Use 1 tab daily may use 2 tabs daily if needed        sertraline 100 MG tablet    ZOLOFT    45 tablet    Take 1.5 tablets (150 mg) by mouth daily    Depression, unspecified depression type       ULTRA OMEGA 3 1000 MG Caps     120 capsule    Take 2 capsules by mouth 2 times daily Nordic Naturals Ultimate Omega Brand. 1.1g EPA+DHA per 2 capsules. Pt to take 1.1g BID

## 2018-07-20 NOTE — MR AVS SNAPSHOT
After Visit Summary   7/20/2018    Jasen Guillen III    MRN: [id number]           Patient Information     Date Of Birth          1995        Visit Information        Provider Department      7/20/2018 2:30 PM Nurse, [facility] Psychiatry Psychiatry [facility]        Today's Diagnoses     Schizophrenia, unspecified type (H)    -  1       Follow-ups after your visit        Follow-up notes from your care team     Return in 4 weeks (on 8/20/2018) for Medication management.      Who to contact     Please call your clinic at [phone] to:    Ask questions about your health    Make or cancel appointments    Discuss your medicines    Learn about your test results    Speak to your doctor            Additional Information About Your Visit        [employer] Information     Three Rings gives you secure access to your electronic health record. If you see a primary care provider, you can also send messages to your care team and make appointments. If you have questions, please call your primary care clinic.  If you do not have a primary care provider, please call [phone] and they will assist you.      Three Rings is an electronic gateway that provides easy, online access to your medical records. With [employer] Rings, you can request a clinic appointment, read your test results, renew a prescription or communicate with your care team.     To access your existing account, please contact your [facility] or call [phone] for assistance.        Care EveryWhere ID     This is your Care EveryWhere ID. This could be used by other organizations to access your [place] medical records  [id number]         Blood Pressure from Last 3 Encounters:   06/15/18 116/66   05/04/18 110/74   05/01/18 121/72    Weight from Last 3 Encounters:   06/15/18 86.3 kg (190 lb 3.2 oz)   05/04/18 85.2 kg (187 lb 12.8 oz)   05/01/18 85.3 kg (188 lb)              Today, you had the following     No orders found for display        Primary Care Provider Fax #    Physician No Ref-Primary [phone]       No address on file        Equal Access to Services     JENNIFER CASTILLO : Hadii vladislav vance ramón Ruby, apolinar bazzi, shelly russell, adrian luzin hayaan kareemjavan lawson lajaguarcarolina castaneda. So [facility] [phone].    ATENCIÓN: Si habla español, tiene a ventura disposición servicios gratuitos de asistencia lingüística. Llame al [phone].    We comply with applicable federal civil rights laws and [place] laws. We do not discriminate on the basis of race, color, national origin, age, disability, sex, sexual orientation, or gender identity.            Thank you!     Thank you for choosing PSYCHIATRY CLINIC  for your care. Our goal is always to provide you with excellent care. Hearing back from our patients is one way we can continue to improve our services. Please take a few minutes to complete the written survey that you may receive in the mail after your visit with us. Thank you!             Your Updated Medication List - Protect others around you: Learn how to safely use, store and throw away your medicines at www.disposemymeds.org.          This list is accurate as of 7/20/18  3:56 PM.  Always use your most recent med list.                   Brand Name Dispense Instructions for use Diagnosis    LORazepam 0.5 MG tablet    ATIVAN    10 tablet    Take 1 tablet (0.5 mg) by mouth every 8 hours as needed for anxiety        paliperidone 156 MG/ML Susp injection    INVEGA SUSTENNA    1 mL    Inject 1 mL (156 mg) into the muscle once for 1 dose        paliperidone 3 MG 24 hr tablet    INVEGA    6 tablet    Use 1 tab daily may use 2 tabs daily if needed        sertraline 100 MG tablet    ZOLOFT    45 tablet    Take 1.5 tablets (150 mg) by mouth daily    Depression, unspecified depression type       ULTRA OMEGA 3 1000 MG Caps     120 capsule    Take 2 capsules by mouth 2 times daily Nordic Naturals Ultimate Omega Brand. 1.1g EPA+DHA per 2 capsules. Pt  to take 1.1g BID

## 2018-07-20 NOTE — NURSING NOTE
"Jasen Wilmer,  1995, presented to the clinic today at the request of Dr. Rios,  ordering provider for long-acting injectable Invega Sustenna 156 mg.    OBSERVATIONS:  1.  Appearance: Well groomed. Casually dressed in slacks, clean shirt.   2.  Mood: Friendly, engaged  3.  Affect: Full  4.  Attitude: Talkative. Pt stated \"I'm going to a play in [employer] with some friends.\"  5.  Cooperation: Full  6.  Side Effects: Denied  7.  Education: None needed  8. Next appointment: 2018 at 1200    The service provided today was rendered under the supervising provider of the day, Dr. Purcell, who was available for consultation as needed.    "

## 2018-07-27 NOTE — PROGRESS NOTES
First Episode Psychosis Program   Young Adult Group  [facility]      Patient: Jasen Guillen III (1995)     MRN: [id number]  Date:  7/20/18  Diagnosis: Psychosis F29    Number of Participants: 2  Group Time: 60 minutes  Facilitators: Sera Landrum PsyD, Howard Mcmahon, Ph.D.,  Grace Stearns M.AMartin       The first episode group is based on the cognitive behavioral therapy model that uses psychoeducation, cognitive restructuring, problem solving techniques, and social skills.       Diagnostic criteria for group participation: History of psychosis      Group Topic for Today: Today the group continued to discuss symptom management during recovery. Patients shared experiences with observing symptoms without judgment and how it helps with symptom management.      Description: Active Participant  Mr. Guillen was active and engaged in the discussion with other patients about recovery.  Jasen's emotional presentation was open. Jasen's mood was normal though perhaps elevated/hypomanic at times, and his affect was appropriate.     Plan: Continue with group goals to minimize impact of psychotic symptoms and maintain stability    I was present for and actively participated in the entire group therapy session. Jasen Guillen III participated appropriately in group today and appeared to benefit from the therapy milieu.    Sera Landrum Psy.D., L.P.

## 2018-08-08 NOTE — TELEPHONE ENCOUNTER
Denies known Latex allergy or symptoms of Latex sensitivity.  Medications reviewed and updated.     Patient requested letter stating that he was unable to take test yesterday due to side effects from his medications. Letter was typed up, signed by Dr. Rios and given to patient in clinic. Ivonne Ball LPN

## 2018-08-21 ENCOUNTER — ALLIED HEALTH/NURSE VISIT (OUTPATIENT)
Dept: PSYCHIATRY | Facility: CLINIC | Age: 23
End: 2018-08-21
Attending: PSYCHIATRY & NEUROLOGY
Payer: COMMERCIAL

## 2018-08-21 DIAGNOSIS — F20.9 SCHIZOPHRENIA, UNSPECIFIED TYPE (H): Primary | ICD-10-CM

## 2018-08-21 PROCEDURE — 96372 THER/PROPH/DIAG INJ SC/IM: CPT | Mod: ZF

## 2018-08-21 PROCEDURE — 25000128 H RX IP 250 OP 636: Mod: ZF

## 2018-08-21 NOTE — MR AVS SNAPSHOT
After Visit Summary   8/21/2018    Jasen Guillen III    MRN: [id number]           Patient Information     Date Of Birth          1995        Visit Information        Provider Department      8/21/2018 2:00 PM Nurse, [facility] Psychiatry Psychiatry Clinic        Today's Diagnoses     Schizophrenia, unspecified type (H)    -  1       Follow-ups after your visit        Follow-up notes from your care team     Return in 4 weeks (on 9/18/2018) for Medication Management.      Your next 10 appointments already scheduled     Sep 18, 2018 10:00 AM CDT   Nurse Visit with [facility] Psychiatry Nurse   Psychiatry Clinic ([facility])    [address]  [address] [id number] [phone]              Who to contact     Please call your clinic at [phone] to:    Ask questions about your health    Make or cancel appointments    Discuss your medicines    Learn about your test results    Speak to your doctor            Additional Information About Your Visit        MyChart Information     [employer] gives you secure access to your electronic health record. If you see a primary care provider, you can also send messages to your care team and make appointments. If you have questions, please call your primary care clinic.  If you do not have a primary care provider, please call [phone] and they will assist you.      [employer] is an electronic gateway that provides easy, online access to your medical records. With [employer], you can request a clinic appointment, read your test results, renew a prescription or communicate with your care team.     To access your existing account, please contact your [facility] or call [phone] for assistance.        Care EveryWhere ID     This is your Care EveryWhere ID. This could be used by other organizations to access your [place] medical records  [id number]         Blood Pressure from Last 3  Encounters:   06/15/18 116/66   05/04/18 110/74   05/01/18 121/72    Weight from Last 3 Encounters:   06/15/18 86.3 kg (190 lb 3.2 oz)   05/04/18 85.2 kg (187 lb 12.8 oz)   05/01/18 85.3 kg (188 lb)              Today, you had the following     No orders found for display       Primary Care Provider Fax #    Physician No Ref-Primary [phone]       No address on file        Equal Access to Services     JENNIFER CASTILLO : Hadii vladislav mccabeo Soomaali, waaxda luqadaha, qaybta kaalmada adeegyada, adrian guallpa . So [facility] [phone].    ATENCIÓN: Si habla español, tiene a ventura disposición servicios gratuitos de asistencia lingüística. Llame al [phone].    We comply with applicable federal civil rights laws and [place] laws. We do not discriminate on the basis of race, color, national origin, age, disability, sex, sexual orientation, or gender identity.            Thank you!     Thank you for choosing PSYCHIATRY CLINIC  for your care. Our goal is always to provide you with excellent care. Hearing back from our patients is one way we can continue to improve our services. Please take a few minutes to complete the written survey that you may receive in the mail after your visit with us. Thank you!             Your Updated Medication List - Protect others around you: Learn how to safely use, store and throw away your medicines at www.disposemymeds.org.          This list is accurate as of 8/21/18  3:55 PM.  Always use your most recent med list.                   Brand Name Dispense Instructions for use Diagnosis    LORazepam 0.5 MG tablet    ATIVAN    10 tablet    Take 1 tablet (0.5 mg) by mouth every 8 hours as needed for anxiety        paliperidone 156 MG/ML Susp injection    INVEGA SUSTENNA    1 mL    Inject 1 mL (156 mg) into the muscle once for 1 dose        paliperidone 3 MG 24 hr tablet    INVEGA    6 tablet    Use 1 tab daily may use 2 tabs daily if needed        sertraline 100 MG  tablet    ZOLOFT    45 tablet    Take 1.5 tablets (150 mg) by mouth daily    Depression, unspecified depression type       ULTRA OMEGA 3 1000 MG Caps     120 capsule    Take 2 capsules by mouth 2 times daily Nordic Naturals Ultimate Omega Brand. 1.1g EPA+DHA per 2 capsules. Pt to take 1.1g BID

## 2018-08-21 NOTE — NURSING NOTE
Jasen Guillen,  1995, presented to the clinic today at the request of Dr. Rios,  ordering provider for long-acting injectable Invega Sustenna 156 mg.    OBSERVATIONS:  1.  Appearance: Casually dressed in shorts and t-shirt.   2.  Mood: Good, talkative, friendly  3.  Affect: Full, congruent  4.  Attitude: Good, pleasant, engaged  5.  Cooperation: Full  6.  Side Effects: Denied  7.  Education: None needed  8. Next appointment: [place]2018 at 1000    The service provided today was rendered under the supervising provider of the day, Dr. Schneider, who was available for consultation as needed.

## 2018-09-18 ENCOUNTER — ALLIED HEALTH/NURSE VISIT (OUTPATIENT)
Dept: PSYCHIATRY | Facility: CLINIC | Age: 23
End: 2018-09-18
Attending: PSYCHIATRY & NEUROLOGY
Payer: COMMERCIAL

## 2018-09-18 DIAGNOSIS — F20.9 SCHIZOPHRENIA, UNSPECIFIED TYPE (H): Primary | ICD-10-CM

## 2018-09-18 PROCEDURE — 25000128 H RX IP 250 OP 636: Mod: ZF

## 2018-09-18 PROCEDURE — 96372 THER/PROPH/DIAG INJ SC/IM: CPT | Mod: ZF

## 2018-09-18 NOTE — NURSING NOTE
Jasen Guillen,  1995, presented to the clinic today at the request of Dr. Rios,  ordering provider for long-acting injectable Invega Sustenna 156 mg.    OBSERVATIONS:  1.  Appearance: Casually dressed in clean shorts, t shirt and hoodie. Pt hurried d/t rainy weather and class starting on [place].   2.  Mood: Good, talkative, engaged  3.  Affect: Congruent  4.  Attitude: Good, friendly  5.  Cooperation: Full  6.  Side Effects: Denied  7.  Education: None needed  8. Next appointment: 10/05/18 with Dr. Rios at 0845 and injection on 10/16/2018 at 1000    The service provided today was rendered under the supervising provider of the day, Dr. Schneider, who was available for consultation as needed.

## 2018-09-18 NOTE — MR AVS SNAPSHOT
After Visit Summary   9/18/2018    Jasen Guillen III    MRN: [id number]           Patient Information     Date Of Birth          1995        Visit Information        Provider Department      9/18/2018 10:00 AM Nurse, [facility] Psychiatry Psychiatry Clinic        Today's Diagnoses     Schizophrenia, unspecified type (H)    -  1       Follow-ups after your visit        Follow-up notes from your care team     Return in 4 weeks (on 10/16/2018) for Medication management.      Your next 10 appointments already scheduled     Oct 05, 2018  8:45 AM CDT   First Episode Return with Lynnette Rios MD   Psychiatry Clinic ([facility])    [place] [id number] [address] [address] [phone]            Oct 16, 2018 10:00 AM CDT   Nurse Visit with [facility] Psychiatry Nurse   Psychiatry Clinic ([facility])    [place] [id number]  [facility] [address] [phone]              Who to contact     Please call your clinic at [phone] to:    Ask questions about your health    Make or cancel appointments    Discuss your medicines    Learn about your test results    Speak to your doctor            Additional Information About Your Visit        [employer] Information     [employer] gives you secure access to your electronic health record. If you see a primary care provider, you can also send messages to your care team and make appointments. If you have questions, please call your primary care clinic.  If you do not have a primary care provider, please call [phone] and they will assist you.      Secpanel is an electronic gateway that provides easy, online access to your medical records. With [employer], you can request a clinic appointment, read your test results, renew a prescription or communicate with your care team.     To access your existing account, please contact your [facility]  [facility]inic or call [phone] for assistance.        Care EveryWhere ID     This is your Care EveryWhere ID. This could be used by other organizations to access your [place] medical records  [id number]         Blood Pressure from Last 3 Encounters:   06/15/18 116/66   05/04/18 110/74   05/01/18 121/72    Weight from Last 3 Encounters:   06/15/18 86.3 kg (190 lb 3.2 oz)   05/04/18 85.2 kg (187 lb 12.8 oz)   05/01/18 85.3 kg (188 lb)              Today, you had the following     No orders found for display       Primary Care Provider Fax #    Physician No Ref-Primary [phone]       No address on file        Equal Access to Services     JENNIFER CASTILLO : Giovany Ruby, wachaparroda jerryadaha, qaybta kaalmada denayarain, adrian guallpa . So [facility] [phone].    ATENCIÓN: Si habla español, tiene a ventura disposición servicios gratuitos de asistencia lingüística. Llame al [phone].    We comply with applicable federal civil rights laws and [place] laws. We do not discriminate on the basis of race, color, national origin, age, disability, sex, sexual orientation, or gender identity.            Thank you!     Thank you for choosing PSYCHIATRY CLINIC  for your care. Our goal is always to provide you with excellent care. Hearing back from our patients is one way we can continue to improve our services. Please take a few minutes to complete the written survey that you may receive in the mail after your visit with us. Thank you!             Your Updated Medication List - Protect others around you: Learn how to safely use, store and throw away your medicines at www.disposemymeds.org.          This list is accurate as of 9/18/18 10:29 AM.  Always use your most recent med list.                   Brand Name Dispense Instructions for use Diagnosis    LORazepam 0.5 MG tablet    ATIVAN    10 tablet    Take 1 tablet (0.5 mg) by mouth every 8 hours as needed for anxiety        paliperidone 156  MG/ML Susp injection    INVEGA SUSTENNA    1 mL    Inject 1 mL (156 mg) into the muscle once for 1 dose        paliperidone 3 MG 24 hr tablet    INVEGA    6 tablet    Use 1 tab daily may use 2 tabs daily if needed        sertraline 100 MG tablet    ZOLOFT    45 tablet    Take 1.5 tablets (150 mg) by mouth daily    Depression, unspecified depression type       ULTRA OMEGA 3 1000 MG Caps     120 capsule    Take 2 capsules by mouth 2 times daily Nordic Naturals Ultimate Omega Brand. 1.1g EPA+DHA per 2 capsules. Pt to take 1.1g BID

## 2018-09-20 ENCOUNTER — TELEPHONE (OUTPATIENT)
Dept: PSYCHIATRY | Facility: CLINIC | Age: 23
End: 2018-09-20

## 2018-09-20 NOTE — TELEPHONE ENCOUNTER
"First Episode Psychosis Program    Incoming/Outgoing Call  [facility] Psychiatry Clinic    Outgoing call to: Jasen Guillen III. [phone] (home)     Reason for Call:  Dr. Rios received request from the patient to discuss future treatment plans, but she has not heard a follow-up response.    Response/Plan:    \"I would really like to join [facility]; a [employer] community in a [facility].  I would join this community and live there the rest of my life.  As I have been looking into it, I was reviewing how my mental health would fit into that.  I know we were talking about decreasing my medication, and I am wondering about moving forward with decreasing it to see if that lower dose will continue to help me.    One of the things about exploring this will be having the tools to educate the community.  I will need to disclose my diagnosis of schizophrenia, and this may be an implication for me joining the community because of the stigma attached to such a diagnosis.  I am wondering if there might be a better response to the diagnosis of Delusional Disorder, and I feel that this is fitting because I haven't actually had any hallucinations.  I think they would accept this differently.  I know we don't focus on my diagnosis as much as the symptoms I experience, but hoping Dr. Rios and I can look at this option\".        Writer explored his current symptoms, and he feels like things are going well for him right now.  He doesn't have any urgent concerns and feels this topic can be discussed further with Dr. Rios in their appointment on 10/5.        Will route to patient's current psychiatric provider(s) as an FYI.   Please call or EPIC message with any questions or concerns.    HELIO Lombardi, [facility]  [phone]        "

## 2018-10-05 ENCOUNTER — OFFICE VISIT (OUTPATIENT)
Dept: PSYCHIATRY | Facility: CLINIC | Age: 23
End: 2018-10-05
Attending: PSYCHIATRY & NEUROLOGY
Payer: COMMERCIAL

## 2018-10-05 VITALS — HEART RATE: 81 BPM | DIASTOLIC BLOOD PRESSURE: 70 MMHG | WEIGHT: 197.6 LBS | SYSTOLIC BLOOD PRESSURE: 106 MMHG

## 2018-10-05 DIAGNOSIS — F20.9 SCHIZOPHRENIA, UNSPECIFIED TYPE (H): Primary | ICD-10-CM

## 2018-10-05 PROCEDURE — G0463 HOSPITAL OUTPT CLINIC VISIT: HCPCS | Mod: ZF

## 2018-10-05 ASSESSMENT — PAIN SCALES - GENERAL: PAINLEVEL: NO PAIN (0)

## 2018-10-05 NOTE — PROGRESS NOTES
"  PSYCHIATRY CLINIC PROGRESS NOTE     CC:  \"I am excited about the plans I am making for after collegel.\"    INTERIM HISTORY                                                   Jasen Guillen III is a 21 year old male with a diagnosis of schizophrenia (for past ~ 3 years) who comes in for a scheduled appointment today. We met for medication eval and management, as well as 20 minutes of supportive psychotherapy.    Patient had been receiving paliperidone injections, 234 mg q month, reduced to 156 mg 4 months ago.He has tolerated this reduction well, with only occasional motor restlessness kenny resolves spontaneously. No ocular movements.    Pt had had an ED visit about a week after the initial reduction, for the following sx:  Patient notes that about 3:00 this afternoon he began to have strange noises coming from his voice states that his voice changes rapidly and especially when he is \"thinking about it\". He also felt compelled to do repetitive scratching of his left cheek.    He received lorazapem 0.5 mg po in the ED and noted immediate relief of the tic-like sx.  He was discharged home with several lorazepam tabs and several oral invega tabs, as per my instructions to the ED physician. There have been no further episodes like this.    Pt was taking sertraline 150 mg po qam for depressive and anxiety sx, but these have resolved over the past 9 months. He stopped sertraline completely over the summer and has done well, with no further depressive episodes.    In general, things are going \"very well\".  Sleeping well, eating well, getting regular exercise. Classes are going well and he will graduate next spring.    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 Has been visiting [employer], and found a good fit with a [employer] in [place]. Very interested [employer]am contemplative life.    PSYCH ROS:    Positive Psychotic symptoms:      No recurrence of ideas " "of reference. Does not report any feelings of paranoia or being in danger.  Denies hallucinations or disorganized thinking.     Negative psychotic symptoms:    Anhedonia and amotivation are resolved.  Patient engaging in activities with friends at school (such as swing dancing on Thursday evenings). Continues to feel \"even better\" on lower dose of Invega.    Mood symptoms:     Sx of depression and anxiety are now completely resolved and have not recurred even when stopping sertraline.   No feelings of hopelessness or of self-harm. No SI.    Cognition:    Attention and concentration better. Doing well in classes.    Substance use: Not present.    Obsessive-compulsive symptoms: Not present    MEDICAL ROS:       Constitutional: No fever, fatigue.  Has had some weight loss due to eating less, but this is stable.    Neurologic: Denies problems with salivation, muscle stiffness, motor restlessness. Eye spasms have resolved. Denies all abnormal motor movements since episode in the spring.  No dizziness.    HEENT: Mild dry mouth. No dry eyes.    Cardiovascular: No chest pain or palpitations.    Respiratory: No shortness of breath, dyspnea on exertion, or wheezing.    Gastrointestinal: No nausea or constipation     : No symptoms reported    Musculoskeletal:No musculoskeletal difficulties.    Skin: WNL.    Endocrine: No abnormalities.    PSYCHOSOCIAL HX:    Has a close relationship with friends in [employer] group-- is able to share his personal hx with them and get support.    Has been visiting [employer] and [employer] of different orders. Has felt most \"at home\" with the [employer].      PAST PSYCH HISTORY:    Received intensive treatment 3 years ago for an enduring episode of psychosis that was characterized predominantly by ideas of reference (feeling that he was being talked about  over the Internet) he also experienced disorganized thinking and disorganized behavior.    Of note, this patient's past andrecent presentation has " "been characterized by the presence of mild chronic dyskinetic movements, now much improved.    ALLERGY                                Review of patient's allergies indicates no known allergies.  MEDICATIONS                               Paliperidone 156 m IM q 28 days       VITALS     /70  Pulse 81  Wt 89.6 kg (197 lb 9.6 oz)  MENTAL STATUS EXAM                                                             Orientation:  x4  Gait and Station:  Walks smoothly in squires, normal arm swing, normal gait.  No UE stifness.  Very mild fleeting dyskinetic movements observable in face and hands. No vocalizations. No scratching. No masked facies.  Alertness: Alert, oriented.  Appearance: Neatly and casually dressed.  Behavior/Demeanor: Calm, cooperative, pleasant, with excellent eye contact.  Appropriately interactive.  Speech: Normal rate and rhythm, normal volume.  Language: Language is cogent, descriptive of his activities  Psychomotor: Normal PM speed. No slowing observed.   Mood: \"very good.\"  Affect:Full range, expressive, appropriate to content. SHows sense of humor-- smiles and uses some humor.  Thought Process/Associations: Linear, logical.  Thought Content: Denies delusional thoughts, ideas of reference, SI. HI.    Perception:  Denies hallucinations. No illusions.  Insight: Excellent.  Judgment: Excellent  Cognition:  Intact  Recent and Remote Memory: Intact  Attention Span and Concentration:  Intact during exam  Fund of Knowledge:excellent        DIAGNOSIS     Schizophrenia, doing very well with no overt sx.  Brief episode of tic-like sx 4 monthsago that resolved well with a single dose of 0.5 mg lorazepam with no further recurrence.   Major depression, in remission for nearly one year.  Extrapyramidal side effects (oculogyrc spasms), resolved spontaneously, no longer on Cogentin.     ASSESSMENT                                     Schizophrenia, in remission,doing well on current lower dose of Invega injectable, " with no recurrence of tic-like sx or other basal ganglia or motor strip dysfunction unmasked by the lowering of the D2 blocking agent except possibly for some mild restlessness in LEs.  Will continue to observe and assess.    In terms of the depressive symptoms, the patient experienced some recurrence of sx about 12 months ago, these have been in remission for nearly a year. Has done well with no sertraline for pat 4 months.    Also of note, his prior negative symptoms of lack of motivation, lack of energy, and anhedonia, now are resolved, especially on lower dose of medication. The patient reports that he is experiencing strong motivation and enjoyment and is espcially excited about his plans after college.    Cognitive symptoms:Cognition is greatly improved based on subjective self-report (patient recently participated in cognitive training study).       PLAN                                                                                                       1) Positive Psychotic Sx:   Continue on current regimen of reduced dose of injectable paliperidone 156 mg po q month, now has been on it ~6 months                Will consider a decrease in dose at next visit.         No longer requiring Cogentin for EPS.             Pt has not yet started omega-3 essential fatty acids but plans to do so.    2) Negative psychotic symptoms:      Resolved.    3) Mood and anxiety symptoms:   Resolved. Will d/c sertraline as pt has done well for up to a week at a time without this.    4) Brief episode of tic-like sx:   No further episodes for past 8 + weeks.              Pt to continue to monitor. Has lorazepam tablets to use prn if these recur.    5)  RTC 7 weeks        TREATMENT RISK STATEMENT:  The risks, benefits, alternatives and potential adverse effects have been discussed and are understood by the patient/ patient's guardian. The pt understands the risks of using street drugs or alcohol.  There are no medical  "contraindications, the pt agrees to treatment with the ability to do so.  The patient understands to call 911 or come to the nearest ED if life threatening or urgent symptoms present.    PSYCHIATRY CLINIC INDIVIDUAL PSYCHOTHERAPY NOTE   Length of session: 20 minutes, 9:00 to 9:20.    Subjective: \"Things are great-- I am excited about my future plans.\"   Plan:         Continue supportive psychotherapy during monthly medication management visits.         Psychotherapy services during this visit included  myself and Jasen Guillen.  Issues addressed in therapy included: planning for graduatin and joining a [employer] order as a novitiate-- implications for treatment and self-disclosure.      TREATMENT  PLAN          SYMPTOMS;PROBLEMS   MEASURABLE GOALS;    FUNCTIONAL IMPROVEMENT INTERVENTIONS;    GAINS MADE DISCHARGE CRITERIA   Low energy, lack of motivation Patient will experience a significant improvement in energy and motivation, and will be able to engage in outsidesocializing activities as well as classwork Supportive Psychotherapy once per month    Patient enjoying active social life REsolved   Ideas of reference during times of stress Patient will no longer experience ideas of reference Monthly injectable paliperidone  CBT techniques  IOR improved after recent flare-up Two years without symptoms, followed by two years of monitoring without symptoms relapse   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      No tics         NOticed that urges to joiunt 2 thoughts flared then have decreased and now not present  Noticed that urges to vocalize swear words was present but then not acted uppon  No need for loraz or oral paliperidone  Slight increase in restlessness and " dyskinesieas-- Because I am excited

## 2018-10-05 NOTE — NURSING NOTE
Chief Complaint   Patient presents with     Recheck Medication     Psychosis, unspecified psychosis type

## 2018-10-08 NOTE — TELEPHONE ENCOUNTER
From: Renetta Springer  Sent: Wednesday, May 3, 2017 3:29 PM  To: Gato Guillen; Gato Guillen  Subject: Psychiatry Provider for the summer    Here is what I found for psychiatry.  I contacted this psychiatry department and got all the details on scheduling an appointment and with who.  Now, their FEPP has not started yet, but the doctor that will be working in that program is able to see you and has openings.  They have clients see the  first, then schedule a f/u appointment with the doctor.  Their RN will provide the injection in clinic.     What date will you be home in [place]?  They have openings with the  on May 29/30/31, and can have the f/u with the doctor in early June to receive your injection.    All the contact information is listed below and the provider to schedule with.  When you are in clinic next Gato, we should complete an JONNY so we can fax records to this doctor.  Would you mind letting me know when your [place] appointments are schedule for once they are locked in?    First Episode Psychosis Program (FEPP)  [facility] Dept. of Psychiatry  [address] [address]  Service model: [facility] ([facility])  Website: [email or web address]/education/medical-[email or web address]/[email or web address]/  Phone: [phone]  Email: [email or web address]  Provider: Dr. Sarath MD ([email or web address])        HELIO Lombardi, [facility]  First Episode Psychosis Program   [facility]  [facility], 2nd floor  [address], [address]  [place] [address]  Direct Phone: [phone]  Fax: [phone]     Improved

## 2018-10-16 ENCOUNTER — ALLIED HEALTH/NURSE VISIT (OUTPATIENT)
Dept: PSYCHIATRY | Facility: CLINIC | Age: 23
End: 2018-10-16
Attending: PSYCHIATRY & NEUROLOGY
Payer: COMMERCIAL

## 2018-10-16 DIAGNOSIS — F20.9 SCHIZOPHRENIA, UNSPECIFIED TYPE (H): Primary | ICD-10-CM

## 2018-10-16 PROCEDURE — 96372 THER/PROPH/DIAG INJ SC/IM: CPT | Mod: ZF

## 2018-10-16 NOTE — MR AVS SNAPSHOT
After Visit Summary   10/16/2018    Jasen Guillen III    MRN: [id number]           Patient Information     Date Of Birth          1995        Visit Information        Provider Department      10/16/2018 12:30 PM Nurse, [facility]        Today's Diagnoses     Schizophrenia, unspecified type (H)    -  1       Follow-ups after your visit        Follow-up notes from your care team     Return in 4 weeks (on 11/13/2018) for Medication management.      Your next 10 appointments already scheduled     Nov 13, 2018 10:00 AM CST   Nurse Visit with [facility] Psychiatry Nurse   Psychiatry Clinic ([facility])    [place] [id number]  [address] [id number] [phone]            Nov 16, 2018  8:15 AM CST   First Episode Return with Lynnette Rios MD   Psychiatry Clinic ([facility])    [place] [id number]  [address] [id number] [phone]            Dec 11, 2018 10:00 AM CST   Nurse Visit with [facility] Psychiatry Nurse   Psychiatry Clinic ([facility])    [place] [id number]  [address] [place] [id number] [phone]              Who to contact     Please call your clinic at [phone] to:    Ask questions about your health    Make or cancel appointments    Discuss your medicines    Learn about your test results    Speak to your doctor            Additional Information About Your Visit        MyChart Information     [employer] gives you secure access to your electronic health record. If you see a primary care provider, you can also send messages to your care team and make appointments. If you have questions, please call your primary care clinic.  If you do not have a primary care provider, please call [phone] and they will assist you.      [employer] is an electronic gateway that provides easy, online access to your medical records.  With [employer], you can request a clinic appointment, read your test results, renew a prescription or communicate with your care team.     To access your existing account, please contact your [facility] or call [phone] for assistance.        Care EveryWhere ID     This is your Care EveryWhere ID. This could be used by other organizations to access your [place] medical records  [id number]         Blood Pressure from Last 3 Encounters:   10/05/18 106/70   06/15/18 116/66   05/04/18 110/74    Weight from Last 3 Encounters:   10/05/18 89.6 kg (197 lb 9.6 oz)   06/15/18 86.3 kg (190 lb 3.2 oz)   05/04/18 85.2 kg (187 lb 12.8 oz)              Today, you had the following     No orders found for display       Primary Care Provider Fax #    Physician No Ref-Primary [phone]       No address on file        Equal Access to Services     JENNIFER CASTILLO : Hadii aad ku hadasho Soaline, waaxda luqadaha, qaybta kaalmada adeegyada, adrian guallpa . So [facility] [phone].    ATENCIÓN: Si habla español, tiene a ventura disposición servicios gratuitos de asistencia lingüística. Silas al [phone].    We comply with applicable federal civil rights laws and [place] laws. We do not discriminate on the basis of race, color, national origin, age, disability, sex, sexual orientation, or gender identity.            Thank you!     Thank you for choosing PSYCHIATRY CLINIC  for your care. Our goal is always to provide you with excellent care. Hearing back from our patients is one way we can continue to improve our services. Please take a few minutes to complete the written survey that you may receive in the mail after your visit with us. Thank you!             Your Updated Medication List - Protect others around you: Learn how to safely use, store and throw away your medicines at www.disposemymeds.org.          This list is accurate as of 10/16/18  1:09 PM.  Always use your most  recent med list.                   Brand Name Dispense Instructions for use Diagnosis    LORazepam 0.5 MG tablet    ATIVAN    10 tablet    Take 1 tablet (0.5 mg) by mouth every 8 hours as needed for anxiety        paliperidone 156 MG/ML Susp injection    INVEGA SUSTENNA    1 mL    Inject 1 mL (156 mg) into the muscle once for 1 dose        paliperidone 3 MG 24 hr tablet    INVEGA    6 tablet    Use 1 tab daily may use 2 tabs daily if needed        sertraline 100 MG tablet    ZOLOFT    45 tablet    Take 1.5 tablets (150 mg) by mouth daily    Depression, unspecified depression type       ULTRA OMEGA 3 1000 MG Caps     120 capsule    Take 2 capsules by mouth 2 times daily Nordic Naturals Ultimate Omega Brand. 1.1g EPA+DHA per 2 capsules. Pt to take 1.1g BID

## 2018-10-16 NOTE — NURSING NOTE
Jasen Guillen,  1995, presented to the clinic today at the request of Dr. Rios,  ordering provider for long-acting injectable Invega Sustenna 156 mg.    OBSERVATIONS:  1.  Appearance: Casually dressed in clean jeans, t-shirt, light weight jacket and backpack. Pt apologized for missing earlier appointment saying he was studying for an exam.  2.  Mood: Good, talkative  3.  Affect: Congruent to mood  4.  Attitude: Good  5.  Cooperation: Full  6.  Side Effects: Denied  7.  Education: None needed  8. Next appointment: 2018 at 1000 and 2018 at 1000 injections    The service provided today was rendered under the supervising provider of the day, Dr. Schneider, who was available for consultation as needed.

## 2018-11-02 ENCOUNTER — OFFICE VISIT (OUTPATIENT)
Dept: PSYCHIATRY | Facility: CLINIC | Age: 23
End: 2018-11-02
Attending: PSYCHIATRY & NEUROLOGY
Payer: COMMERCIAL

## 2018-11-02 ENCOUNTER — TELEPHONE (OUTPATIENT)
Dept: PSYCHIATRY | Facility: CLINIC | Age: 23
End: 2018-11-02

## 2018-11-02 ENCOUNTER — OFFICE VISIT (OUTPATIENT)
Dept: PSYCHIATRY | Facility: CLINIC | Age: 23
End: 2018-11-02
Attending: PSYCHOLOGIST
Payer: COMMERCIAL

## 2018-11-02 VITALS — WEIGHT: 195 LBS | SYSTOLIC BLOOD PRESSURE: 108 MMHG | DIASTOLIC BLOOD PRESSURE: 72 MMHG | HEART RATE: 68 BPM

## 2018-11-02 DIAGNOSIS — F20.0 ACUTE EXACERBATION OF CHRONIC PARANOID SCHIZOPHRENIA (H): ICD-10-CM

## 2018-11-02 DIAGNOSIS — F20.9 SCHIZOPHRENIA, UNSPECIFIED TYPE (H): Primary | ICD-10-CM

## 2018-11-02 DIAGNOSIS — F20.0 ACUTE EXACERBATION OF CHRONIC PARANOID SCHIZOPHRENIA (H): Primary | ICD-10-CM

## 2018-11-02 PROCEDURE — G0463 HOSPITAL OUTPT CLINIC VISIT: HCPCS | Mod: ZF

## 2018-11-02 RX ORDER — PALIPERIDONE 6 MG/1
6 TABLET, EXTENDED RELEASE ORAL DAILY
Qty: 30 TABLET | Refills: 0 | Status: SHIPPED | OUTPATIENT
Start: 2018-11-02 | End: 2018-12-14

## 2018-11-02 RX ORDER — PALIPERIDONE 3 MG/1
3 TABLET, EXTENDED RELEASE ORAL DAILY
Qty: 30 TABLET | Refills: 0 | Status: SHIPPED | OUTPATIENT
Start: 2018-11-02 | End: 2019-09-06

## 2018-11-02 RX ORDER — PALIPERIDONE 3 MG/1
TABLET, EXTENDED RELEASE ORAL
Qty: 6 TABLET | Refills: 0 | Status: SHIPPED | OUTPATIENT
Start: 2018-11-02 | End: 2018-11-02

## 2018-11-02 RX ORDER — LORAZEPAM 0.5 MG/1
0.5 TABLET ORAL EVERY 8 HOURS PRN
Qty: 10 TABLET | Refills: 0 | Status: SHIPPED | OUTPATIENT
Start: 2018-11-02 | End: 2018-11-02

## 2018-11-02 RX ORDER — LORAZEPAM 0.5 MG/1
0.5 TABLET ORAL EVERY 8 HOURS PRN
Qty: 90 TABLET | Refills: 1
Start: 2018-11-02 | End: 2019-09-06

## 2018-11-02 ASSESSMENT — PAIN SCALES - GENERAL: PAINLEVEL: NO PAIN (0)

## 2018-11-02 NOTE — MR AVS SNAPSHOT
After Visit Summary   11/2/2018    Jasen Guillen III    MRN: [id number]           Patient Information     Date Of Birth          1995        Visit Information        Provider Department      11/2/2018 8:15 AM Lynnette Rios MD Psychiatry Clinic        Today's Diagnoses     Schizophrenia, unspecified type (H)    -  1       Follow-ups after your visit        Your next 10 appointments already scheduled     Nov 13, 2018 10:00 AM CST   Nurse Visit with [facility] Psychiatry Nurse   Psychiatry Clinic ([facility])    [place] [id number]  [address] [place] [address] [phone]            Nov 16, 2018  8:15 AM CST   First Episode Return with Lynnette Rios MD   Psychiatry Clinic ([facility])    [place] [id number]  [address] [place] [address] [phone]            Dec 11, 2018 10:00 AM CST   Nurse Visit with [facility] Psychiatry Nurse   Psychiatry Clinic ([facility])    [place] [id number]  [address] [place] [address] [phone]              Who to contact     Please call your clinic at [phone] to:    Ask questions about your health    Make or cancel appointments    Discuss your medicines    Learn about your test results    Speak to your doctor            Additional Information About Your Visit        PinchPointharEmbibe Information     [employer] gives you secure access to your electronic health record. If you see a primary care provider, you can also send messages to your care team and make appointments. If you have questions, please call your primary care clinic.  If you do not have a primary care provider, please call [phone] and they will assist you.      [employer] is an electronic gateway that provides easy, online access to your medical records. With [employer], you can request a clinic appointment, read your test results, renew a prescription or communicate  with your care team.     To access your existing account, please contact your [facility] or call [phone] for assistance.        Care EveryWhere ID     This is your Care EveryWhere ID. This could be used by other organizations to access your [place] medical records  [id number]         Blood Pressure from Last 3 Encounters:   11/09/18 104/68   11/02/18 108/72   10/05/18 106/70    Weight from Last 3 Encounters:   11/09/18 88.6 kg (195 lb 6.4 oz)   11/02/18 88.5 kg (195 lb)   10/05/18 89.6 kg (197 lb 9.6 oz)              Today, you had the following     No orders found for display         Today's Medication Changes          These changes are accurate as of 11/2/18 11:59 PM.  If you have any questions, ask your nurse or doctor.               Start taking these medicines.        Dose/Directions    LORazepam 0.5 MG tablet   Commonly known as:  ATIVAN   Used for:  Acute exacerbation of chronic paranoid schizophrenia (H)   Started by:  Lynnette Rios MD        Dose:  0.5 mg   Take 1 tablet (0.5 mg) by mouth every 8 hours as needed for anxiety   Quantity:  90 tablet   Refills:  1       * paliperidone 3 MG 24 hr tablet   Commonly known as:  INVEGA   Used for:  Acute exacerbation of chronic paranoid schizophrenia (H)   Started by:  Lynnette Rios MD        Dose:  3 mg   Take 1 tablet (3 mg) by mouth daily   Quantity:  30 tablet   Refills:  0       * paliperidone 6 MG 24 hr tablet   Commonly known as:  INVEGA   Used for:  Acute exacerbation of chronic paranoid schizophrenia (H)   Started by:  Lynnette Rios MD        Dose:  6 mg   Take 1 tablet (6 mg) by mouth daily   Quantity:  30 tablet   Refills:  0       * Notice:  This list has 2 medication(s) that are the same as other medications prescribed for you. Read the directions carefully, and ask your doctor or other care provider to review them with you.         Where to get your medicines      These medications were sent to  [facility] - [place], [place] - [address]  [address], [facility] [address]     Phone:  [phone]     paliperidone 3 MG 24 hr tablet    paliperidone 6 MG 24 hr tablet         Some of these will need a paper prescription and others can be bought over the counter.  Ask your nurse if you have questions.     You don't need a prescription for these medications     LORazepam 0.5 MG tablet                Primary Care Provider Fax #    Physician No Ref-Primary [phone]       No address on file        Equal Access to Services     JENNIFER CASTILLO : Hadii aad ku hadasho Soomaali, waaxda luqadaha, qaybta kaalmada adejavanyarain, adrian guallpa . So [facility] [phone].    ATENCIÓN: Si habla español, tiene a ventura disposición servicios gratuitos de asistencia lingüística. Madelyname al [phone].    We comply with applicable federal civil rights laws and [place] laws. We do not discriminate on the basis of race, color, national origin, age, disability, sex, sexual orientation, or gender identity.            Thank you!     Thank you for choosing PSYCHIATRY CLINIC  for your care. Our goal is always to provide you with excellent care. Hearing back from our patients is one way we can continue to improve our services. Please take a few minutes to complete the written survey that you may receive in the mail after your visit with us. Thank you!             Your Updated Medication List - Protect others around you: Learn how to safely use, store and throw away your medicines at www.disposemymeds.org.          This list is accurate as of 11/2/18 11:59 PM.  Always use your most recent med list.                   Brand Name Dispense Instructions for use Diagnosis    LORazepam 0.5 MG tablet    ATIVAN    90 tablet    Take 1 tablet (0.5 mg) by mouth every 8 hours as needed for anxiety    Acute exacerbation of chronic paranoid schizophrenia (H)       paliperidone 156 MG/ML Susp injection     INVEGA SUSTENNA    1 mL    Inject 1 mL (156 mg) into the muscle once for 1 dose        * paliperidone 3 MG 24 hr tablet    INVEGA    30 tablet    Take 1 tablet (3 mg) by mouth daily    Acute exacerbation of chronic paranoid schizophrenia (H)       * paliperidone 6 MG 24 hr tablet    INVEGA    30 tablet    Take 1 tablet (6 mg) by mouth daily    Acute exacerbation of chronic paranoid schizophrenia (H)       sertraline 100 MG tablet    ZOLOFT    45 tablet    Take 1.5 tablets (150 mg) by mouth daily    Depression, unspecified depression type       ULTRA OMEGA 3 1000 MG Caps     120 capsule    Take 2 capsules by mouth 2 times daily Nordic Naturals Ultimate Omega Brand. 1.1g EPA+DHA per 2 capsules. Pt to take 1.1g BID        * Notice:  This list has 2 medication(s) that are the same as other medications prescribed for you. Read the directions carefully, and ask your doctor or other care provider to review them with you.

## 2018-11-02 NOTE — TELEPHONE ENCOUNTER
[facility] Call Center    Phone Message    May a detailed message be left on voicemail: yes    Reason for Call: Medication Question or concern regarding medication   Prescription Clarification  Name of Medication: paliperidone 3 mg  Prescribing Provider: Lynnette Rios   Pharmacy: [facility]   What on the order needs clarification? Prescription qty is 6, but pharmacy is unable to dispense qty less than 30 (1 bottle).            Action Taken: Message routed to:  Other: Jinny Huang RNCC

## 2018-11-02 NOTE — TELEPHONE ENCOUNTER
Called Fran with [facility]. Relayed the provider's order to increase to 6 mg daily. She's concerned that the pt should not continue at that dose after a few days. Also, the 6 mg tabs cannot be cut in half as they are not scored. Writer voiced understanding. Both Fran and writer agreed it would make the most sense to include prescriptions for oral Invega 6 mg- #30, take one daily and a separate prescription for oral Invega 3 mg # 30. Fran requested that writer include a note that says, do not fill 3 mg Rx until pt requests this. Writer agreed to do so.    Called in lorazepam 0.5 mg script to pharmacist, Fran with [facility]. Med tab changed to reflect this.     Tried calling pt to relay all of this information. No answer and VM not set up. Sent [employer] message as he's active.

## 2018-11-02 NOTE — MR AVS SNAPSHOT
After Visit Summary   11/2/2018    Jasen Guillen III    MRN: [id number]           Patient Information     Date Of Birth          1995        Visit Information        Provider Department      11/2/2018 3:00 PM Sera Landrum, PhD Psychiatry Clinic        Today's Diagnoses     Schizophrenia, unspecified type (H)    -  1       Follow-ups after your visit        Your next 10 appointments already scheduled     Nov 13, 2018 10:00 AM CST   Nurse Visit with [facility] Psychiatry Nurse   Psychiatry Clinic ([facility])    [place] [id number]  [address] [place] [address] [phone]            Nov 16, 2018  8:15 AM CST   First Episode Return with Lynnette Rios MD   Psychiatry Clinic ([facility])    [place] [id number]  [address] [place] [phone]            Dec 11, 2018 10:00 AM CST   Nurse Visit with [facility] Psychiatry Nurse   Psychiatry Clinic ([facility])    [place] [id number]  [address] [place] [phone]              Who to contact     Please call your clinic at [phone] to:    Ask questions about your health    Make or cancel appointments    Discuss your medicines    Learn about your test results    Speak to your doctor            Additional Information About Your Visit        [employer] Information     [employer] gives you secure access to your electronic health record. If you see a primary care provider, you can also send messages to your care team and make appointments. If you have questions, please call your primary care clinic.  If you do not have a primary care provider, please call [phone] and they will assist you.      [employer] is an electronic gateway that provides easy, online access to your medical records. With [employer], you can request a clinic appointment, read your test results, renew a prescription or  communicate with your care team.     To access your existing account, please contact your [facility] or call [phone] for assistance.        Care EveryWhere ID     This is your Care EveryWhere ID. This could be used by other organizations to access your [place] medical records  [id number]         Blood Pressure from Last 3 Encounters:   11/02/18 108/72   10/05/18 106/70   06/15/18 116/66    Weight from Last 3 Encounters:   11/02/18 88.5 kg (195 lb)   10/05/18 89.6 kg (197 lb 9.6 oz)   06/15/18 86.3 kg (190 lb 3.2 oz)              Today, you had the following     No orders found for display         Today's Medication Changes          These changes are accurate as of 11/2/18 11:59 PM.  If you have any questions, ask your nurse or doctor.               Start taking these medicines.        Dose/Directions    LORazepam 0.5 MG tablet   Commonly known as:  ATIVAN   Used for:  Acute exacerbation of chronic paranoid schizophrenia (H)   Started by:  Lynnette Rios MD        Dose:  0.5 mg   Take 1 tablet (0.5 mg) by mouth every 8 hours as needed for anxiety   Quantity:  90 tablet   Refills:  1       * paliperidone 3 MG 24 hr tablet   Commonly known as:  INVEGA   Used for:  Acute exacerbation of chronic paranoid schizophrenia (H)   Started by:  Lynnette Rios MD        Dose:  3 mg   Take 1 tablet (3 mg) by mouth daily   Quantity:  30 tablet   Refills:  0       * paliperidone 6 MG 24 hr tablet   Commonly known as:  INVEGA   Used for:  Acute exacerbation of chronic paranoid schizophrenia (H)   Started by:  Lynnette Rios MD        Dose:  6 mg   Take 1 tablet (6 mg) by mouth daily   Quantity:  30 tablet   Refills:  0       * Notice:  This list has 2 medication(s) that are the same as other medications prescribed for you. Read the directions carefully, and ask your doctor or other care provider to review them with you.         Where to get your medicines      These medications  were sent to [place], [place] - [address]  [address], [facility] [address]     Phone:  [phone]     paliperidone 3 MG 24 hr tablet    paliperidone 6 MG 24 hr tablet         Some of these will need a paper prescription and others can be bought over the counter.  Ask your nurse if you have questions.     You don't need a prescription for these medications     LORazepam 0.5 MG tablet                Primary Care Provider Fax #    Physician No Ref-Primary [phone]       No address on file        Equal Access to Services     [facility] : Hadeyal melgar Soaline, waaxda luqadaha, qaybta kaalmada adejavanyarain, adrian guallpa . So [facility] [phone].    ATENCIÓN: Si habla español, tiene a ventura disposición servicios gratuitos de asistencia lingüística. Silas al [phone].    We comply with applicable federal civil rights laws and [place] laws. We do not discriminate on the basis of race, color, national origin, age, disability, sex, sexual orientation, or gender identity.            Thank you!     Thank you for choosing PSYCHIATRY CLINIC  for your care. Our goal is always to provide you with excellent care. Hearing back from our patients is one way we can continue to improve our services. Please take a few minutes to complete the written survey that you may receive in the mail after your visit with us. Thank you!             Your Updated Medication List - Protect others around you: Learn how to safely use, store and throw away your medicines at www.disposemymeds.org.          This list is accurate as of 11/2/18 11:59 PM.  Always use your most recent med list.                   Brand Name Dispense Instructions for use Diagnosis    LORazepam 0.5 MG tablet    ATIVAN    90 tablet    Take 1 tablet (0.5 mg) by mouth every 8 hours as needed for anxiety    Acute exacerbation of chronic paranoid schizophrenia (H)       paliperidone 156 MG/ML Susp  injection    INVEGA SUSTENNA    1 mL    Inject 1 mL (156 mg) into the muscle once for 1 dose        * paliperidone 3 MG 24 hr tablet    INVEGA    30 tablet    Take 1 tablet (3 mg) by mouth daily    Acute exacerbation of chronic paranoid schizophrenia (H)       * paliperidone 6 MG 24 hr tablet    INVEGA    30 tablet    Take 1 tablet (6 mg) by mouth daily    Acute exacerbation of chronic paranoid schizophrenia (H)       sertraline 100 MG tablet    ZOLOFT    45 tablet    Take 1.5 tablets (150 mg) by mouth daily    Depression, unspecified depression type       ULTRA OMEGA 3 1000 MG Caps     120 capsule    Take 2 capsules by mouth 2 times daily Nordic Naturals Ultimate Omega Brand. 1.1g EPA+DHA per 2 capsules. Pt to take 1.1g BID        * Notice:  This list has 2 medication(s) that are the same as other medications prescribed for you. Read the directions carefully, and ask your doctor or other care provider to review them with you.

## 2018-11-02 NOTE — MR AVS SNAPSHOT
After Visit Summary   11/2/2018    Jasen Guillen III    MRN: [id number]           Patient Information     Date Of Birth          1995        Visit Information        Provider Department      11/2/2018 9:15 AM Lynnette Rios MD Psychiatry Clinic        Today's Diagnoses     Acute exacerbation of chronic paranoid schizophrenia (H)    -  1       Follow-ups after your visit        Your next 10 appointments already scheduled     Nov 13, 2018 10:00 AM CST   Nurse Visit with [facility] Psychiatry Nurse   Psychiatry Clinic ([facility])    [place] [id number]  [address] [place] [phone]            Nov 16, 2018  8:15 AM CST   First Episode Return with Lynnette Rios MD   Psychiatry Clinic ([facility])    [place] [id number]  [address] [place] [phone]            Dec 11, 2018 10:00 AM CST   Nurse Visit with [facility] Psychiatry Nurse   Psychiatry Clinic ([facility])    [place] [id number]  [address] [place] [phone]              Who to contact     Please call your clinic at [phone] to:    Ask questions about your health    Make or cancel appointments    Discuss your medicines    Learn about your test results    Speak to your doctor            Additional Information About Your Visit        Ernie[employer]hart Information     [employer] gives you secure access to your electronic health record. If you see a primary care provider, you can also send messages to your care team and make appointments. If you have questions, please call your primary care clinic.  If you do not have a primary care provider, please call [phone] and they will assist you.      [employer]Cell is an electronic gateway that provides easy, online access to your medical records. With [employer], you can request a clinic appointment, read your test results, renew a  prescription or communicate with your care team.     To access your existing account, please contact your [facility] or call [phone] for assistance.        Care EveryWhere ID     This is your Care EveryWhere ID. This could be used by other organizations to access your [place] medical records  [id number]        Your Vitals Were     Pulse                   68            Blood Pressure from Last 3 Encounters:   11/09/18 104/68   11/02/18 108/72   10/05/18 106/70    Weight from Last 3 Encounters:   11/09/18 88.6 kg (195 lb 6.4 oz)   11/02/18 88.5 kg (195 lb)   10/05/18 89.6 kg (197 lb 9.6 oz)              Today, you had the following     No orders found for display         Today's Medication Changes          These changes are accurate as of 11/2/18 11:59 PM.  If you have any questions, ask your nurse or doctor.               Start taking these medicines.        Dose/Directions    LORazepam 0.5 MG tablet   Commonly known as:  ATIVAN   Used for:  Acute exacerbation of chronic paranoid schizophrenia (H)   Started by:  Lynnette Rios MD        Dose:  0.5 mg   Take 1 tablet (0.5 mg) by mouth every 8 hours as needed for anxiety   Quantity:  90 tablet   Refills:  1       * paliperidone 3 MG 24 hr tablet   Commonly known as:  INVEGA   Used for:  Acute exacerbation of chronic paranoid schizophrenia (H)   Started by:  Lynnette Rios MD        Dose:  3 mg   Take 1 tablet (3 mg) by mouth daily   Quantity:  30 tablet   Refills:  0       * paliperidone 6 MG 24 hr tablet   Commonly known as:  INVEGA   Used for:  Acute exacerbation of chronic paranoid schizophrenia (H)   Started by:  Lynnette Rios MD        Dose:  6 mg   Take 1 tablet (6 mg) by mouth daily   Quantity:  30 tablet   Refills:  0       * Notice:  This list has 2 medication(s) that are the same as other medications prescribed for you. Read the directions carefully, and ask your doctor or other care provider to review  them with you.         Where to get your medicines      These medications were sent to [facility] - [place], [place] - [address]  [address] [address]     Phone:  [phone]     paliperidone 3 MG 24 hr tablet    paliperidone 6 MG 24 hr tablet         Some of these will need a paper prescription and others can be bought over the counter.  Ask your nurse if you have questions.     You don't need a prescription for these medications     LORazepam 0.5 MG tablet                Primary Care Provider Fax #    Physician No Ref-Primary [phone]       No address on file        Equal Access to Services     [facility]: Hadii vladislav melgar Soaline, waaxda lujoan, qaybta kaalmada drew, adrian castaneda. So [facility] [phone].    ATENCIÓN: Si habla español, tiene a ventura disposición servicios gratuitos de asistencia lingüística. [facility] [phone].    We comply with applicable federal civil rights laws and [place] laws. We do not discriminate on the basis of race, color, national origin, age, disability, sex, sexual orientation, or gender identity.            Thank you!     Thank you for choosing PSYCHIATRY CLINIC  for your care. Our goal is always to provide you with excellent care. Hearing back from our patients is one way we can continue to improve our services. Please take a few minutes to complete the written survey that you may receive in the mail after your visit with us. Thank you!             Your Updated Medication List - Protect others around you: Learn how to safely use, store and throw away your medicines at www.disposemymeds.org.          This list is accurate as of 11/2/18 11:59 PM.  Always use your most recent med list.                   Brand Name Dispense Instructions for use Diagnosis    LORazepam 0.5 MG tablet    ATIVAN    90 tablet    Take 1 tablet (0.5 mg) by mouth every 8 hours as needed for anxiety    Acute exacerbation of  chronic paranoid schizophrenia (H)       paliperidone 156 MG/ML Susp injection    INVEGA SUSTENNA    1 mL    Inject 1 mL (156 mg) into the muscle once for 1 dose        * paliperidone 3 MG 24 hr tablet    INVEGA    30 tablet    Take 1 tablet (3 mg) by mouth daily    Acute exacerbation of chronic paranoid schizophrenia (H)       * paliperidone 6 MG 24 hr tablet    INVEGA    30 tablet    Take 1 tablet (6 mg) by mouth daily    Acute exacerbation of chronic paranoid schizophrenia (H)       sertraline 100 MG tablet    ZOLOFT    45 tablet    Take 1.5 tablets (150 mg) by mouth daily    Depression, unspecified depression type       ULTRA OMEGA 3 1000 MG Caps     120 capsule    Take 2 capsules by mouth 2 times daily Nordic Naturals Ultimate Omega Brand. 1.1g EPA+DHA per 2 capsules. Pt to take 1.1g BID        * Notice:  This list has 2 medication(s) that are the same as other medications prescribed for you. Read the directions carefully, and ask your doctor or other care provider to review them with you.

## 2018-11-02 NOTE — TELEPHONE ENCOUNTER
Writer discussed current medication issue with the provider. Received VORB to change prescription to a 30 d/s with one additional refill. Also, she would like to prescribe a 30 d/s with one additional refill for Ativan. Writer agreed to make this adjustment.    Called [facility] at [phone] and spoke to pharmacist, Fran. Fran voiced understanding, but stated insurance is denying more than one tab per day. She stated the prescription can be changed the following ways: 6 mg daily, reorder as 3 mg- 1 tab daily, start a PA, or order prescription for 6 mg and 3 mg. Also, the prescription is not in stock at [facility] and if the pt would like to start it ASAP, it will need to be sent to another pharmacy.    Relayed the above information to the provider. Received VORB to increase dose to 6 mg daily.     Called the pt at the number in the demographics section. Discussed current issue. He would like to continue receiving his prescriptions with [facility] and is willing to increase the dose to 6 mg daily. Although, he believes the provider mentioned only taking 6 mg for the first few days and then decreasing to 3 mg daily. Writer agreed to discuss cutting the tabs in half with the pharmacist and call him back with an update. Lastly, the pt has some 3 mg tabs left over from his last prescription and is willing to wait until Monday, when the medication will be delivered to the pharmacy.

## 2018-11-05 NOTE — PROGRESS NOTES
First Episode Group       Client: Jasen Guillen III  : 1995  MRN: [id number]  Date of Service: 2018  Diagnosis: Jasen Guillen III is being seen for a diagnosis of schizophrenia  Number of Participants: 4  Group Time: 60 minutes  Facilitators: Sera Landrum Psy.D. L.P., HELIO Ocampo, BURTON, Gennaro Ring, Howard Mcmahon, Ph.D.    The first episode group is based on the cognitive behavioral therapy model that uses psychoeducation, cognitive restructuring, problem solving techniques, and social skills.     Diagnostic criteria for group participation: History of psychosis    Group Topic for Today:Metacognition and application to life experience as well as symptom management.    Description: Attentive   , Active Participant, Cooperative  Jasen Guillen III was engaged in the group milieu. He was very engaged in group and spoke up frequently. He was sensitive and aware of the amount he was sharing and he would periodically check in on the amount he was sharing with the group.  Plan: Continue with group goals to minimize impact of psychotic symptoms and maintain stability

## 2018-11-09 ENCOUNTER — OFFICE VISIT (OUTPATIENT)
Dept: PSYCHIATRY | Facility: CLINIC | Age: 23
End: 2018-11-09
Attending: PSYCHIATRY & NEUROLOGY
Payer: COMMERCIAL

## 2018-11-09 VITALS — DIASTOLIC BLOOD PRESSURE: 68 MMHG | WEIGHT: 195.4 LBS | HEART RATE: 65 BPM | SYSTOLIC BLOOD PRESSURE: 104 MMHG

## 2018-11-09 DIAGNOSIS — F20.3 UNDIFFERENTIATED SCHIZOPHRENIA (H): Primary | ICD-10-CM

## 2018-11-09 LAB
CHOLEST SERPL-MCNC: 195 MG/DL
HDLC SERPL-MCNC: 53 MG/DL
LDLC SERPL CALC-MCNC: 127 MG/DL
NONHDLC SERPL-MCNC: 142 MG/DL
TRIGL SERPL-MCNC: 73 MG/DL

## 2018-11-09 PROCEDURE — G0463 HOSPITAL OUTPT CLINIC VISIT: HCPCS | Mod: ZF

## 2018-11-09 PROCEDURE — 36415 COLL VENOUS BLD VENIPUNCTURE: CPT | Performed by: PSYCHIATRY & NEUROLOGY

## 2018-11-09 PROCEDURE — 80061 LIPID PANEL: CPT | Performed by: PSYCHIATRY & NEUROLOGY

## 2018-11-09 ASSESSMENT — PAIN SCALES - GENERAL: PAINLEVEL: NO PAIN (0)

## 2018-11-09 NOTE — PROGRESS NOTES
This was a duplicate in my morning schedule of 11-02-18.    Please see my note from this same date.

## 2018-11-09 NOTE — MR AVS SNAPSHOT
After Visit Summary   11/9/2018    Jasen Guillen III    MRN: [id number]           Patient Information     Date Of Birth          1995        Visit Information        Provider Department      11/9/2018 8:15 AM Lynnette Rios MD [facility]iatry Clinic        Today's Diagnoses     Undifferentiated schizophrenia (H)    -  1       Follow-ups after your visit        Your next 10 appointments already scheduled     Dec 11, 2018 10:00 AM CST   Nurse Visit with [facility] Psychiatry Nurse   Psychiatry Clinic ([facility])    [address] [address] [phone]            Dec 14, 2018  9:15 AM CST   First Episode Return with Lynnette Rios MD   Psychiatry Clinic ([facility])    [address] [address] [phone]              Who to contact     Please call your clinic at [phone] to:    Ask questions about your health    Make or cancel appointments    Discuss your medicines    Learn about your test results    Speak to your doctor            Additional Information About Your Visit        Outbox Systemshart Information     Buru Buru gives you secure access to your electronic health record. If you see a primary care provider, you can also send messages to your care team and make appointments. If you have questions, please call your primary care clinic.  If you do not have a primary care provider, please call [phone] and they will assist you.      Buru Buru is an electronic gateway that provides easy, online access to your medical records. With Buru Buru, you can request a clinic appointment, read your test results, renew a prescription or communicate with your care team.     To access your existing account, please contact your [facility] or call [phone] for assistance.        Care EveryWhere ID     This is your Care EveryWhere ID. This  could be used by other organizations to access your [place] medical records  [id number]        Your Vitals Were     Pulse                   65            Blood Pressure from Last 3 Encounters:   11/09/18 104/68   11/02/18 108/72   10/05/18 106/70    Weight from Last 3 Encounters:   11/09/18 88.6 kg (195 lb 6.4 oz)   11/02/18 88.5 kg (195 lb)   10/05/18 89.6 kg (197 lb 9.6 oz)              We Performed the Following     Lipid Profile        Primary Care Provider Fax #    Physician No Ref-Primary [phone]       No address on file        Equal Access to Services     [facility] : Hadii vladislav Ruby, apolinar bazzi, shelly angelmarain russell, adrian guallpa . So [facility] [phone].    ATENCIÓN: Si habla español, tiene a ventura disposición servicios gratuitos de asistencia lingüística. Llame al [phone].    We comply with applicable federal civil rights laws and [place] laws. We do not discriminate on the basis of race, color, national origin, age, disability, sex, sexual orientation, or gender identity.            Thank you!     Thank you for choosing PS[facility]ATRY CLINIC  for your care. Our goal is always to provide you with excellent care. Hearing back from our patients is one way we can continue to improve our services. Please take a few minutes to complete the written survey that you may receive in the mail after your visit with us. Thank you!             Your Updated Medication List - Protect others around you: Learn how to safely use, store and throw away your medicines at www.disposemymeds.org.          This list is accurate as of 11/9/18 11:59 PM.  Always use your most recent med list.                   Brand Name Dispense Instructions for use Diagnosis    LORazepam 0.5 MG tablet    ATIVAN    90 tablet    Take 1 tablet (0.5 mg) by mouth every 8 hours as needed for anxiety    Acute exacerbation of chronic paranoid schizophrenia (H)       paliperidone 156 MG/ML Susp  injection    INVEGA SUSTENNA    1 mL    Inject 1 mL (156 mg) into the muscle once for 1 dose        * paliperidone 3 MG 24 hr tablet    INVEGA    30 tablet    Take 1 tablet (3 mg) by mouth daily    Acute exacerbation of chronic paranoid schizophrenia (H)       * paliperidone 6 MG 24 hr tablet    INVEGA    30 tablet    Take 1 tablet (6 mg) by mouth daily    Acute exacerbation of chronic paranoid schizophrenia (H)       sertraline 100 MG tablet    ZOLOFT    45 tablet    Take 1.5 tablets (150 mg) by mouth daily    Depression, unspecified depression type       ULTRA OMEGA 3 1000 MG Caps     120 capsule    Take 2 capsules by mouth 2 times daily Nordic Naturals Ultimate Omega Brand. 1.1g EPA+DHA per 2 capsules. Pt to take 1.1g BID        * Notice:  This list has 2 medication(s) that are the same as other medications prescribed for you. Read the directions carefully, and ask your doctor or other care provider to review them with you.

## 2018-11-09 NOTE — NURSING NOTE
Chief Complaint   Patient presents with     Recheck Medication     Schizophrenia, unspecified type (H)

## 2018-11-13 ENCOUNTER — TELEPHONE (OUTPATIENT)
Dept: PSYCHIATRY | Facility: CLINIC | Age: 23
End: 2018-11-13

## 2018-11-13 NOTE — TELEPHONE ENCOUNTER
11/13/2018, patient missed his scheduled IM injection for Invega Sustenna 156 mg today. This writer called [phone], patient's listed phone number in EPIC, and the mailbox was full, so writer was unable to leave detailed voice message. A note was routed to Dr. Rios and Jinny Huang RN to notify them of the missed injection appointment. This writer will try tomorrow, 11/14/2018

## 2018-11-14 NOTE — TELEPHONE ENCOUNTER
11/14/2018, this writer called patient's listed phone number, [phone], and mailbox was full, unable to leave detailed voice message. Writer will email Dr. Rios and Jinny Huang RN.Sariah Rivers LPN

## 2018-11-16 ENCOUNTER — TELEPHONE (OUTPATIENT)
Dept: PSYCHIATRY | Facility: CLINIC | Age: 23
End: 2018-11-16

## 2018-11-16 ENCOUNTER — ALLIED HEALTH/NURSE VISIT (OUTPATIENT)
Dept: PSYCHIATRY | Facility: CLINIC | Age: 23
End: 2018-11-16
Payer: COMMERCIAL

## 2018-11-16 DIAGNOSIS — Z23 NEED FOR PROPHYLACTIC VACCINATION AND INOCULATION AGAINST INFLUENZA: Primary | ICD-10-CM

## 2018-11-16 DIAGNOSIS — F20.9 SCHIZOPHRENIA, UNSPECIFIED TYPE (H): ICD-10-CM

## 2018-11-16 PROCEDURE — 90686 IIV4 VACC NO PRSV 0.5 ML IM: CPT | Mod: ZF

## 2018-11-16 PROCEDURE — 96372 THER/PROPH/DIAG INJ SC/IM: CPT | Mod: ZF

## 2018-11-16 PROCEDURE — 25000128 H RX IP 250 OP 636: Mod: ZF

## 2018-11-16 PROCEDURE — G0008 ADMIN INFLUENZA VIRUS VAC: HCPCS | Mod: ZF

## 2018-11-16 NOTE — MR AVS SNAPSHOT
After Visit Summary   11/16/2018    Jasen Guillen III    MRN: [id number]           Patient Information     Date Of Birth          1995        Visit Information        Provider Department      11/16/2018 1:00 PM Nurse, [facility] Psych[facility]        Today's Diagnoses     Need for prophylactic vaccination and inoculation against influenza    -  1    Schizophrenia, unspecified type (H)           Follow-ups after your visit        Follow-up notes from your care team     Return in 4 weeks (on 12/11/2018) for Medication management.      Your next 10 appointments already scheduled     Dec 11, 2018 10:00 AM CST   Nurse Visit with [facility] Psychiatry Nurse   Psychiatry Clinic ([facility])    [place] [id number] [address] [phone]            Dec 14, 2018  9:15 AM CST   First Episode Return with Lynnette Rios MD   Psychiatry Clinic ([facility])    [place] [id number] [address] [phone]              Who to contact     Please call your clinic at [phone] to:    Ask questions about your health    Make or cancel appointments    Discuss your medicines    Learn about your test results    Speak to your doctor            Additional Information About Your Visit        GSOUNDharMoncai Information     Casacanda gives you secure access to your electronic health record. If you see a primary care provider, you can also send messages to your care team and make appointments. If you have questions, please call your primary care clinic.  If you do not have a primary care provider, please call [phone] and they will assist you.      Casacanda is an electronic gateway that provides easy, online access to your medical records. With Casacanda, you can request a clinic appointment, read your test results, renew a prescription or communicate with your care team.     To access your  existing account, please contact your [facility] or call [phone] for assistance.        Care EveryWhere ID     This is your Care EveryWhere ID. This could be used by other organizations to access your [place] medical records  [id number]         Blood Pressure from Last 3 Encounters:   11/09/18 104/68   11/02/18 108/72   10/05/18 106/70    Weight from Last 3 Encounters:   11/09/18 88.6 kg (195 lb 6.4 oz)   11/02/18 88.5 kg (195 lb)   10/05/18 89.6 kg (197 lb 9.6 oz)              We Performed the Following     FLU VACCINE, IM (QUADRIVALENT W/PRESERVATIVES/MULTI-DOSE) [26898]- >3 YRS     Vaccine Administration, Initial [58213]        Primary Care Provider Fax #    Physician No Ref-Primary [phone]       No address on file        Equal Access to Services     ESTRELLITA CASTILLO : Hadii vladislav Ruby, wachaparroda rose mary, hollieta adenalmarain russell, adrian guallpa . So [facility] [phone].    ATENCIÓN: Si habla español, tiene a ventura disposición servicios gratuitos de asistencia lingüística. Madelyndirk al [phone].    We comply with applicable federal civil rights laws and [place] laws. We do not discriminate on the basis of race, color, national origin, age, disability, sex, sexual orientation, or gender identity.            Thank you!     Thank you for choosing PS[facility]ATRY CLINIC  for your care. Our goal is always to provide you with excellent care. Hearing back from our patients is one way we can continue to improve our services. Please take a few minutes to complete the written survey that you may receive in the mail after your visit with us. Thank you!             Your Updated Medication List - Protect others around you: Learn how to safely use, store and throw away your medicines at www.disposemymeds.org.          This list is accurate as of 11/16/18  1:59 PM.  Always use your most recent med list.                   Brand Name Dispense Instructions for  use Diagnosis    LORazepam 0.5 MG tablet    ATIVAN    90 tablet    Take 1 tablet (0.5 mg) by mouth every 8 hours as needed for anxiety    Acute exacerbation of chronic paranoid schizophrenia (H)       paliperidone 156 MG/ML Susp injection    INVEGA SUSTENNA    1 mL    Inject 1 mL (156 mg) into the muscle once for 1 dose        * paliperidone 3 MG 24 hr tablet    INVEGA    30 tablet    Take 1 tablet (3 mg) by mouth daily    Acute exacerbation of chronic paranoid schizophrenia (H)       * paliperidone 6 MG 24 hr tablet    INVEGA    30 tablet    Take 1 tablet (6 mg) by mouth daily    Acute exacerbation of chronic paranoid schizophrenia (H)       sertraline 100 MG tablet    ZOLOFT    45 tablet    Take 1.5 tablets (150 mg) by mouth daily    Depression, unspecified depression type       ULTRA OMEGA 3 1000 MG Caps     120 capsule    Take 2 capsules by mouth 2 times daily Nordic Naturals Ultimate Omega Brand. 1.1g EPA+DHA per 2 capsules. Pt to take 1.1g BID        * Notice:  This list has 2 medication(s) that are the same as other medications prescribed for you. Read the directions carefully, and ask your doctor or other care provider to review them with you.

## 2018-11-16 NOTE — NURSING NOTE
Prior to injection verified patient identity using patient's name and date of birth.  Due to injection administration, patient instructed to remain in clinic for 15 minutes  afterwards, and to report any adverse reaction to me immediately.    Sariah Rivers LPN

## 2018-11-16 NOTE — PROGRESS NOTES

## 2018-11-16 NOTE — TELEPHONE ENCOUNTER
Writer received VORB to administer Invega Sustenna 156 mg today. Pt should also continue with oral Invega. Pt needs to return in 28 days to receive next injection.

## 2018-11-16 NOTE — NURSING NOTE
"Jasen Guillen,  1995, presented to the clinic today at the request of Dr. Rios,  ordering provider for long-acting injectable Invega Sustenna 156 mg.    OBSERVATIONS:  1.  Appearance: Casually dressed in clean jeans, button shirt, and jacket, with backpack. Pt stated \"I'm doing so much better now. Sorry I missed my injection earlier this week, I got the appointments mixed up.\"   2.  Mood: Good, talkative, friendly  3.  Affect: Full  4.  Attitude: Good, engaged  5.  Cooperation: Full, pt in hurry to get to class. Pt to receive flu vaccine also.  6.  Side Effects: Denied  7.  Education: Any break through symptoms, call the clinic or My Chart. Pt acknowledged understanding.  8. Next appointment: 2018 at 1000    The service provided today was rendered under the supervising provider of the day, Dr. Schneider, who was available for consultation as needed.    "

## 2018-12-14 ENCOUNTER — ALLIED HEALTH/NURSE VISIT (OUTPATIENT)
Dept: PSYCHIATRY | Facility: CLINIC | Age: 23
End: 2018-12-14
Attending: PSYCHIATRY & NEUROLOGY
Payer: COMMERCIAL

## 2018-12-14 ENCOUNTER — TELEPHONE (OUTPATIENT)
Dept: PSYCHIATRY | Facility: CLINIC | Age: 23
End: 2018-12-14

## 2018-12-14 VITALS — HEART RATE: 73 BPM | SYSTOLIC BLOOD PRESSURE: 118 MMHG | DIASTOLIC BLOOD PRESSURE: 75 MMHG | WEIGHT: 201 LBS

## 2018-12-14 DIAGNOSIS — F20.9 SCHIZOPHRENIA, UNSPECIFIED TYPE (H): Primary | ICD-10-CM

## 2018-12-14 PROCEDURE — 25000128 H RX IP 250 OP 636: Mod: ZF | Performed by: PSYCHIATRY & NEUROLOGY

## 2018-12-14 PROCEDURE — 96372 THER/PROPH/DIAG INJ SC/IM: CPT | Mod: ZF

## 2018-12-14 PROCEDURE — G0463 HOSPITAL OUTPT CLINIC VISIT: HCPCS | Mod: ZF

## 2018-12-14 RX ADMIN — PALIPERIDONE PALMITATE 156 MG: 156 INJECTION INTRAMUSCULAR at 10:15

## 2018-12-14 ASSESSMENT — PAIN SCALES - GENERAL: PAINLEVEL: NO PAIN (0)

## 2018-12-14 NOTE — TELEPHONE ENCOUNTER
Writer received VORB from provider to enter refills of Invega Sustenna 156 mg. Writer reentered medication with 5 additional refills.

## 2018-12-14 NOTE — NURSING NOTE
Chief Complaint   Patient presents with     Recheck Medication     Undifferentiated schizophrenia

## 2018-12-14 NOTE — NURSING NOTE
"Jasen Guillen,  1995, presented to the clinic today at the request of Dr Rios, ordering provider for long-acting injectable Invega Sustenna 156 mg.    OBSERVATIONS:  1.  Appearance: Casually dressed in clean jeans, pull over sweater and light jacket. Patient stated when asked how he was doing, \"I'm doing great, really good.\" \"It's nice to feel this way again.\"  2.  Mood: Great, very talkative regarding upcoming trip to [place] to see family over the holiday's.  3.  Affect: Full, congruent  4.  Attitude: Great, Friendly, engaged  5.  Cooperation: Full  6.  Side Effects: Denied  7.  Education: Writer told patient to call the clinic or My Chart a nurse if needed while on vacation.  8. Next appointment: 18 at 1500 and 2018 with Dr. Rios.    The service provided today was rendered under the supervising provider of the day, Dr. Purcell, who was available for consultation as needed.    "

## 2018-12-14 NOTE — PROGRESS NOTES
"Feeling much better.  Feels clearer more like himself. Had a brief return of eye spasms, but resolved now.      ABle to get back to classes, catching up on work.    Will get labs today.      PSYCHIATRY CLINIC PROGRESS NOTE     CC:  \"I am doing really good.\"    INTERIM HISTORY                                                   Jasen Guillen III is a 21 year old male with a diagnosis of schizophrenia (for past ~ 3 years) who comes in for a scheduled appointment today. We met for medication eval and management, as well as 20 minutes of supportive psychotherapy.    Patient had been receiving paliperidone injections, 234 mg q month, reduced to 156 mg 7 months ago. Overall, he tolerated this reduction well, with only occasional motor restlessness kenny resolves spontaneously. No ocular movements.    However,6 weeks ago he began to experience a sudden onset of repetitive pinching behavior of himself, and slight agitation. He had also been feeling somewhat down, and had stopped going to classes, where he began to fall behind.    At his last visit, 4 weeks ago, we added paliperidone 3 mg orally every day, plus lorazepam 0.5 mg at bedtime at night as needed.  I also recommended that he re-start his sertraline, which he did not do.    Pt responded to this and has continued to do well,  Although he did find that he fell too far behind in his classes, so he had to do a late withdrawal. However, he has jose auditing a philosophy class, and has begun working on a paper for that. He is very much enjoying this process.    \"I feel the best I have ever felt in a long time.\"    Pt reports only intermittent episodes of tic-like behavior-- vocal tics of swearing, lasting 1 minute to 15 minutes, resolving spontaneously, occurring less than 1 x per week.  No motor tics of self-pinching behavior.    PSYCH ROS:    Positive Psychotic symptoms:      No recurrence of ideas of reference. Does not report any feelings of paranoia or being in " "danger.  Denies hallucinations or disorganized thinking.  No changes to his voice, no agitation, no scratching.    Negative psychotic symptoms:    Anhedonia and amotivation are resolved.  Patient engaged with roommates and social life.    Mood symptoms:     Sx of depression and anxiety remain resolved.   No feelings of hopelessness or of self-harm. No SI.    Cognition:    Attention and concentration better. Able to focus on writing his philosophy paper.    Substance use: Not present.    Obsessive-compulsive symptoms: Not present    Motor symptoms:  Tic-like behavior, as described above.    MEDICAL ROS:       Constitutional: No fever, fatigue.    Neurologic: Denies problems with salivation, muscle stiffness, motor restlessness. Eye spasms have resolved.  + tics and motor restlessness. No dizziness.  HEENT: Mild dry mouth. No dry eyes.  Cardiovascular: No chest pain or palpitations.  Respiratory: No shortness of breath, dyspnea on exertion, or wheezing.  Gastrointestinal: No nausea or constipation  : No symptoms reported  Musculoskeletal:No musculoskeletal difficulties.  Skin: WNL.  Endocrine: No abnormalities.    PSYCHOSOCIAL HX:    Has a close relationship withroommates and friends in [email or web address] group-- is able to share his personal hx with them and get support.    Has been visiting [employer] and [employer] of different orders. Plans to enter the novitiate. Has felt most \"at home\" with the [employer].      PAST PSYCH HISTORY:    Received intensive treatment 3 years ago for an enduring episode of psychosis that was characterized predominantly by ideas of reference (feeling that he was being talked about  over the Internet) he also experienced disorganized thinking and disorganized behavior.    Of note, this patient's past andrecent presentation has been characterized by the presence of mild chronic dyskinetic movements, now much improved.    ALLERGY                                Review of patient's allergies indicates no " "known allergies.  MEDICATIONS                               Paliperidone 156 m IM q 28 days  Oral paliperidone 3 mg po every day   Lorazepam 0.5 mg po at bedtime as needed         VITALS     Blood pressure 118/75, pulse 73, weight 91.2 kg (201 lb).    MENTAL STATUS EXAM                                                             Orientation:  x4  Gait and Station:  Walks smoothly in squires, normal arm swing, normal gait.  No UE stifness.  Very mild fleeting dyskinetic movements observable in face and hands. No vocalizations. No scratching. No masked facies.  Motor:  Shows restless movements in legs.  Alertness: Alert, oriented.  Appearance: Neatly and casually dressed.  Behavior/Demeanor: Calm, cooperative, pleasant, with excellent eye contact.  Appropriately interactive.  Speech: Normal rate and rhythm, normal volume.  Language: Language is cogent, descriptive of his activities  Psychomotor: Normal PM speed. No slowing observed.   Mood: \"really good.\"  Affect:Full range, expressive, appropriate to content. Shows sense of humor-- smiles and uses some humor.  Thought Process/Associations: Linear, logical.  Thought Content: Denies delusional thoughts, ideas of reference, SI. HI.    Perception:  Denies hallucinations. No illusions.  Insight: Excellent.  Judgment: Excellent  Cognition:  Intact  Recent and Remote Memory: Intact  Attention Span and Concentration:  Intact during exam  Fund of Knowledge:excellent        DIAGNOSIS     Schizophrenia, now stable after brief flare-up about 6 weeks ago of motor/mood sx.  Brief episode of tic-like sx 6 months ago that resolved well with a single dose of 0.5 mg lorazepam with no further recurrence.   Major depression, possible recent recurrence.  Extrapyramidal side effects (oculogyrc spasms), resolved spontaneously, no longer on Cogentin.   Mild dyskinesias and possible akathisia. Difficult to disentangle what might be medication induced from what might be primary pathology. There " is a strong motor system presentation in this pt.     ASSESSMENT                                     Schizophrenia, doing well overall on current lower dose of Invega injectable, with a recent recurrence of tic-like  sx.  Has responded well to short-term  addition of oral paliperidone, and now feels very well cognitively and symptomatically.    In terms of the depressive symptoms, the patient experienced some recurrence of sx about 12 months ago, and possibly again a month ago, but now resolved. Does not want to take sertraline.     Also of note, his prior negative symptoms of lack of motivation, lack of energy, and anhedonia, now are resolved, especially on lower dose of medication. The patient reports that he is experiencing strong motivation and enjoyment and is espcially excited about his plans after college.    Cognitive symptoms:Cognition is greatly improved based on subjective self-report (patient recently participated in cognitive training study).       PLAN                                                                                                       1) Positive Psychotic Sx:   Continue on current regimen of reduced dose of injectable paliperidone 156 mg po q month, now has been on it ~8 months                Continue paliperidone 3 mg po every day for now   No longer requiring Cogentin for EPS.    2) Negative psychotic symptoms:      Resolved.    3) Mood and anxiety symptoms:   These are now resolved.    4) Brief episode of tic-like sx:              Pt to continue to monitor. Has lorazepam tablets to use prn if these recur.    5)  RTC 5 weeks        TREATMENT RISK STATEMENT:  The risks, benefits, alternatives and potential adverse effects have been discussed and are understood by the patient/ patient's guardian. The pt understands the risks of using street drugs or alcohol.  There are no medical contraindications, the pt agrees to treatment with the ability to do so.  The patient understands to call  "911 or come to the nearest ED if life threatening or urgent symptoms present.    PSYCHIATRY CLINIC INDIVIDUAL PSYCHOTHERAPY NOTE   Length of session: 20 minutes,10:00 to 10:20    Subjective: \"Things are going super well today.\"   Plan:         Continue supportive psychotherapy during monthly medication management visits.         Psychotherapy services during this visit included  myself and Jasen Guillen.  Issues addressed in therapy included: working on philosophy paper, change in major     TREATMENT  PLAN          SYMPTOMS;PROBLEMS   MEASURABLE GOALS;    FUNCTIONAL IMPROVEMENT INTERVENTIONS;    GAINS MADE DISCHARGE CRITERIA   Low energy, lack of motivation Patient will experience a significant improvement in energy and motivation, and will be able to engage in outsidesocializing activities as well as classwork Supportive Psychotherapy once per month    Patient enjoying active social life REsolved   Ideas of reference during times of stress Patient will no longer experience ideas of reference Monthly injectable paliperidone  CBT techniques  IOR improved after recent flare-up Two years without symptoms, followed by two years of monitoring without symptoms relapse   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      No tics             "

## 2018-12-14 NOTE — PATIENT INSTRUCTIONS
Thank you for coming to the PSYCHIATRY CLINIC.    Lab Testing:  If you had lab testing today and your results are reassuring or normal they will be mailed to you or sent through [employer] within 7 days.   If the lab tests need quick action we will call you with the results.  The phone number we will call with results is # [phone] (home) . If this is not the best number please call our clinic and change the number.    Medication Refills:  If you need any refills please call your pharmacy and they will contact us. Our fax number for refills is [phone]. Please allow three business for refill processing.   If you need to  your refill at a new pharmacy, please contact the new pharmacy directly. The new pharmacy will help you get your medications transferred.     Scheduling:  If you have any concerns about today's visit or wish to schedule another appointment please call our office during normal business hours [phone] (8-5:00 M-F)    Contact Us:  Please call [phone] during business hours (8-5:00 M-F).  If after clinic hours, or on the weekend, please call  [phone].    Financial Assistance [phone]  [employer] Billing [phone]  [employer] Billing [phone]  Medical Records [phone]      MENTAL HEALTH CRISIS NUMBERS:  [facility]:   [facility] - [phone]   Crisis Residence [facility] - [phone]   Walk-In Counseling [facility] [phone]   COPE 24/7 [place] Mobile Team for Adults - [[phone]]; Child - [[phone]]     Crisis Connection - [phone]     [facility]:   [facility] - [phone]   Walk-in counseling [facility] - [phone]   Walk-in counseling [facility] - [phone]   Crisis Residence [facility] - [phone]   Urgent Care Adult Mental Health:   --Drop-in, 24/7 crisis line, and [employer] Mobile Team [[phone]]    CRISIS TEXT  LINE: Text [phone] from anywhere, anytime, any crisis 24/7;    OR SEE [email or web address].org     Poison Control Center - [phone]    CHILD: [facility] needs assessment team - [phone]     [facility] - [phone]; or [employer] [facility] - [phone]    If you have a medical emergency please call 911or go to the nearest ER.                    _____________________________________________    Again thank you for choosing PSYCHIATRY CLINIC and please let us know how we can best partner with you to improve you and your family's health.  You may be receiving a survey in the mail regarding this appointment. We would love to have your feedback, both positive and negative, so please fill out the survey and return it using the provided envelope. The survey is done by an external company, so your answers are anonymous.

## 2019-01-03 NOTE — PROGRESS NOTES
"  PSYCHIATRY CLINIC PROGRESS NOTE     CC:  \"I have had some weird Tourette's symptoms come back and its been really hard for me to get to classes.\"    INTERIM HISTORY                                                   Jasen Guillen III is a 22 year old male with a diagnosis of schizophrenia (for past ~ 3 years) who comes in for an \"urgent care\" appointment today. I had received a call yesterday from his roommate, describing the relatively sudden onset of some vocalizations, and repetitive pinching behavior. Pt had also appeared more distressed and socially withdrawn. I arranged this unscheduled appt today and we met for medication eval and management, as well as 20 minutes of supportive psychotherapy.    Patient had been receiving paliperidone injections, 234 mg q month, reduced to 156 mg 6 months ago.He has tolerated this reduction well, with only occasional motor restlessness that resolves spontaneously. No ocular movements.    Pt had had an ED visit about a week after the initial reduction, for the following sx:  Patient notes that about 3:00 this afternoon he began to have strange noises coming from his voice states that his voice changes rapidly and especially when he is \"thinking about it\". He also felt compelled to do repetitive scratching of his left cheek.    He received lorazapem 0.5 mg po in the ED and noted immediate relief of the tic-like sx.  He was discharged home with several lorazepam tabs and several oral invega tabs, as per my instructions to the ED physician. There have been no further episodes like this.    Pt feels that his current symptoms are very similar to what he experienced during this ED visit.    Of note, pt was taking sertraline 150 mg po qam for depressive and anxiety sx, which had appeared last fall, and he had responded very well.   However, as the depression resolved, he stopped sertraline completely over the summer.    This new onset of sx today represents a clear departure " Labs look good. No med changes. Continue same   "from what has been an overall course of significant clinical improvement over the past 10 months.      PSYCH ROS:    Positive Psychotic symptoms:      Has had some slight feelings this past week that ideas of reference might be about tor recur. Does not report any feelings of paranoia or being in danger.  Denies hallucinations or disorganized thinking.     Negative psychotic symptoms:    Anhedonia and amotivation are not especially prominent-- pt reports that he wants to feel better and go back to classes, but has for some reason felt unable to leave his dorm room.    Mood symptoms:     Pt denies dontrell sadness, but reports feeling \"blank\" and \"stuck\" and unable to push himself to get out and go to class.  No feelings of hopelessness or of self-harm. No SI.    Cognition:    Attention are reported concentration as decreased.    Substance use: Not present.    Obsessive-compulsive symptoms: Not present    Repetitive motor behavior:      Pt has had several episodes of involuntary vocalizationz (brief) and of feeling impelled to pinch his skin till it hurts (e.g., back of hand.\" Reports motor restlessness and rocking behavior.    MEDICAL ROS:       Constitutional: No fever, fatigue.  Has had some weight loss due to eating less, but this is stable.    Neurologic: Denies problems with salivation, muscle stiffness, motor restlessness. Eye spasms not present.  Abnormal motor movements as above.  No dizziness.    HEENT: Mild dry mouth. No dry eyes.    Cardiovascular: No chest pain or palpitations.    Respiratory: No shortness of breath, dyspnea on exertion, or wheezing.    Gastrointestinal: No nausea or constipation     : No symptoms reported    Musculoskeletal:No musculoskeletal difficulties.    Skin: WNL.    Endocrine: No abnormalities.    PSYCHOSOCIAL HX:    Has a close relationship with roommates, who have been involved in getting him support for his current sx.    Has been visiting [employer] and [employer] of different " "orders. Has felt most \"at home\" with the [employer].  Very interested [employer] contemplative life after graduation.    PAST PSYCH HISTORY:    Received intensive treatment 3.5 years ago for an enduring episode of psychosis that was characterized predominantly by ideas of reference (feeling that he was being talked about  over the Internet) he also experienced disorganized thinking and disorganized behavior.    Of note, this patient's past and recent presentation has been characterized by the presence of  dyskinetic movements, recently much improved, now present again    ALLERGY                                Review of patient's allergies indicates no known allergies.  MEDICATIONS                               Paliperidone 156 m IM q 28 days       VITALS     /66  Pulse 86  Wt 86.3 kg (190 lb 3.2 oz)/66  Pulse 86  Wt 86.3 kg (190 lb 3.2 oz)    MENTAL STATUS EXAM                                                             Orientation:  x4  Gait and Station:  Walks normally in squires but with some mild scattered dyskinesias in extremities, normal arm swing, normal gait.  No UE stifness.  Dyskinetic movements observable in face and hands. No vocalizations. No scratching or pinching behavior.Sl masked facies.  Alertness: Alert, oriented.  Appearance: Neatly and casually dressed.  Behavior/Demeanor: Calm, cooperative, pleasant, with sl reduced eye contact.  Appropriately interactive.  Speech: Normal rate and rhythm, normal volume.  Language: Language is cogent, descriptive of his activities  Psychomotor: Normal PM speed. No slowing observed.   Mood: \"I don't feel like myself.\"  Affect: Restricted.  Thought Process/Associations: Linear, logical.  Thought Content: Impoverished today-- seems to have difficulty expressing his experiences and sx. Denies delusional thoughts, ideas of reference, SI. HI.    Perception:  Denies hallucinations. No illusions.  Insight: Good-- is unclear on the nature of his recent " sx.  Judgment: Excellent  Cognition:  Intact  Recent and Remote Memory: Intact  Attention Span and Concentration:  Intact during exam  Fund of Knowledge:excellent        DIAGNOSIS AND ASSESSMENT     Schizophrenia, recent exacerbation of an atypical cluster of sx that include some brief vocalizations, repetitive pinching of his skin (analgous to scratching that appeared briefly about 6 months ago), and severely decreased motivation to go to classes. Pt also appears more sad and constricted, though is not reporting subjective sx of depression. Unclear how much this is related to recurrent depression (see below) or is a result of decrease in Invega injection amount 6 months ago.    Likely recurrence of Major depression syndrome, similar to what occurred in fall of previous year.     Extrapyramidal side effects (oculogyrc spasms), resolved spontaneously, no longer on Cogentin. Dyskinetic movements may be part of underlying psychiatric disorder or part of a TD picture, but doubt the latter as they seem to have gotten better when we reduced the Invega and as pt's overall status has improved in the past  Year.        PLAN                                                                                                       1) Positive Psychotic Sx and tic-like behavior   Continue on current regimen of reduced dose of injectable paliperidone 156 mg po q month, now has been on it ~7 months                Add in oral paliperidone 6 mg po every day to target what appears to be an exacerbation of underlying psychiatric/psychotic syndrome.              Add lorazepam 0.5 mg po at bedtime for now to target sleep and reduce anxiety.    2) Negative psychotic symptoms:      Follow closely-- I believe that what we are observing today is depression rather than true negative sx.    3) Depression and anxiety symptoms:   Initiate sertraline 50 mg po every day. Pt has responded well to this in the past.    4)  RTC 1 week. Pt to call me  "sooner if sx are not resolved in several days.        TREATMENT RISK STATEMENT:  The risks, benefits, alternatives and potential adverse effects have been discussed and are understood by the patient/ patient's guardian. The pt understands the risks of using street drugs or alcohol.  There are no medical contraindications, the pt agrees to treatment with the ability to do so.  The patient understands to call 911 or come to the nearest ED if life threatening or urgent symptoms present.    PSYCHIATRY CLINIC INDIVIDUAL PSYCHOTHERAPY NOTE   Length of session: 20 minutes, 9:40 to 10:00.    Subjective: \"I am worried about not going to classes.\"   Plan:         Continue supportive psychotherapy during monthly medication management visits.         Psychotherapy services during this visit included  myself and Jasen Guillen.  Issues addressed in therapy included: how to address current sx, target depression, and get back to classes to catch up on school work.     TREATMENT  PLAN          SYMPTOMS;PROBLEMS   MEASURABLE GOALS;    FUNCTIONAL IMPROVEMENT INTERVENTIONS;    GAINS MADE DISCHARGE CRITERIA   Low energy, lack of motivation Patient will experience a significant improvement in energy and motivation, and will be able to engage in outsidesocializing activities as well as classwork Supportive Psychotherapy once per month    Re-start sertraline Resolution of depression and inability to do classes and work   Ideas of reference during times of stress Patient will no longer experience ideas of reference Monthly injectable paliperidone  CBT techniques  IOR improved after recent flare-up Two years without symptoms, followed by two years of monitoring without symptoms relapse   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      No tics         NOticed that urges to joiunt 2 thoughts flared then have decreased and now not present  Noticed that urges to vocalize swear words was present but then not acted uppon  No need for loraz or oral paliperidone  Slight increase in restlessness and dyskinesieas-- Because I am excited

## 2019-01-15 ENCOUNTER — ALLIED HEALTH/NURSE VISIT (OUTPATIENT)
Dept: PSYCHIATRY | Facility: CLINIC | Age: 24
End: 2019-01-15
Attending: PSYCHIATRY & NEUROLOGY
Payer: COMMERCIAL

## 2019-01-15 DIAGNOSIS — F20.9 SCHIZOPHRENIA, UNSPECIFIED (H): Primary | ICD-10-CM

## 2019-01-15 DIAGNOSIS — F20.9 SCHIZOPHRENIA, UNSPECIFIED TYPE (H): ICD-10-CM

## 2019-01-15 PROCEDURE — 96372 THER/PROPH/DIAG INJ SC/IM: CPT | Mod: ZF

## 2019-01-15 PROCEDURE — 25000128 H RX IP 250 OP 636: Mod: ZF | Performed by: PSYCHIATRY & NEUROLOGY

## 2019-01-15 RX ADMIN — PALIPERIDONE PALMITATE 156 MG: 156 INJECTION INTRAMUSCULAR at 16:30

## 2019-01-15 NOTE — NURSING NOTE
Gato Alexandre,  89, presented to the clinic today at the request of Dr. Rios,  ordering provider for long-acting injectable Invega Sustenna 156mg.    OBSERVATIONS:  1.  Appearance: Good body hygiene, dressed in jeans a long sleeved shirt and a coat.     2.  Mood: Friendly, talked about how he just got home from [place] today.    3.  Affect: Congruent  4.  Attitude: Engaged, pt said he was happy to be home.    5.  Cooperation: Full  6.  Side Effects: Denied  7.  Education: Ice ai injection site for pain.    8. Next appointment: 2019 @ 1400 for Invega Sustenna 156mg.      The service provided today was rendered under the supervising provider of the day, Dr. Schneider, who was available for consultation as needed.

## 2019-01-18 ENCOUNTER — OFFICE VISIT (OUTPATIENT)
Dept: PSYCHIATRY | Facility: CLINIC | Age: 24
End: 2019-01-18
Attending: PSYCHOLOGIST
Payer: COMMERCIAL

## 2019-01-18 DIAGNOSIS — F20.9 SCHIZOPHRENIA, UNSPECIFIED TYPE (H): Primary | ICD-10-CM

## 2019-01-22 NOTE — PROGRESS NOTES
First Episode Group       Client: Jasen Guillen III  : 1995  MRN: [id number]  Date of Service: 2019  Diagnosis: Jasen Guillen III is being seen for a diagnosis of Unspecified Schizophrenia Spectrum Disorder  Number of Participants: 3  Group Time: 60 minutes  Facilitators: Sera Landrum PsyD LP    The first episode group is based on the cognitive behavioral therapy model that uses psychoeducation, cognitive restructuring, problem solving techniques, and social skills.     Diagnostic criteria for group participation: History of psychosis    Group Topic for Today: Group problem solving    Description: Attentive   , Active Participant, Cooperative  Jasen Guillen III was engaged in the group milieu. He offered a suggestion for a problem for the group to solve and suggested several solutions and ideas for another participants concern.     Plan: Continue with group goals to minimize impact of psychotic symptoms and maintain stability

## 2019-01-29 ENCOUNTER — CARE COORDINATION (OUTPATIENT)
Dept: PSYCHIATRY | Facility: CLINIC | Age: 24
End: 2019-01-29

## 2019-01-29 NOTE — PROGRESS NOTES
First Episode Psychosis Program    Incoming/Outgoing Email  [facility]    Correspondence with: Jasen OVALLES Wilmer III    Reason for contact:  Pt requesting a letter  Writer supplied a letter on behalf of Dr. Rios regarding his health and functionality. Per pt's request, I sent a scanned letter via encrypted email to the patient.  Submitted original letter to scanning.     Content:    Alecia Jackson,  I am going on a pilgrimage to [place] and the people running it don't know much about psychotic illnesses. And they were hoping they could get a note from my doctor saying I'm healthy enough to go out of the country.    Thanks,  Gato Guillen    Statement of Health     Date: January 29th, 2019   From: Dr. Rios at the [facility]   Re: Jasen Guillen       To Whom It May Concern,     Please accept this letter as a statement of health regarding: Jasen Hoskins  Wilmer.      I have examined the individual named above and to the best of my knowledge; he is in good physical and mental health, and is able to function at full capacity.      By signing below I certify that the above information is true.     Please do not hesitate to contact our  if you have additional questions or need further detail: BURTON Lombardi ([phone]). Thank you for your time and consideration.      Best regards,     Lynnette Rios M.D.   Professor and    Salvador Jackson Endowed Chair   Department of Psychiatry     [facility]   [address] [place] [address]       Will route to patient's current psychiatric provider(s) as an FYI.   Please call or EPIC message with any questions or concerns.    HELIO Lombardi, BURTON  [phone]

## 2019-02-12 ENCOUNTER — ALLIED HEALTH/NURSE VISIT (OUTPATIENT)
Dept: PSYCHIATRY | Facility: CLINIC | Age: 24
End: 2019-02-12
Attending: PSYCHIATRY & NEUROLOGY
Payer: COMMERCIAL

## 2019-02-12 DIAGNOSIS — F20.9 SCHIZOPHRENIA, UNSPECIFIED TYPE (H): Primary | ICD-10-CM

## 2019-02-12 PROCEDURE — 25000128 H RX IP 250 OP 636: Mod: ZF | Performed by: PSYCHIATRY & NEUROLOGY

## 2019-02-12 PROCEDURE — 96372 THER/PROPH/DIAG INJ SC/IM: CPT | Mod: ZF

## 2019-02-12 RX ADMIN — PALIPERIDONE PALMITATE 156 MG: 156 INJECTION INTRAMUSCULAR at 15:37

## 2019-02-12 NOTE — NURSING NOTE
Jasen Guillen,  1995, presented to the clinic today at the request of Dr. Rios,  ordering provider for long-acting injectable Invega Sustenna 156 mg.    OBSERVATIONS:  1.  Appearance: Casually dressed in Jeans, t shirt and winter jacket with backpack. Pt apologized for missing yesterdays injection, d/t/heavy class load.  2.  Mood: Good  3.  Affect: Full  4.  Attitude: Good, friendly, talkative  5.  Cooperation: Full  6.  Side Effects: Denied  7.  Education: Ice at injection site  8. Next appointment: 3/1/2019 0915 Dr. Rois, injection on 3/12/2019 at 1500    The service provided today was rendered under the supervising provider of the day, Dr. Schneider, who was available for consultation as needed.

## 2019-03-13 ENCOUNTER — TELEPHONE (OUTPATIENT)
Dept: PSYCHIATRY | Facility: CLINIC | Age: 24
End: 2019-03-13

## 2019-03-13 NOTE — TELEPHONE ENCOUNTER
This writer spoke to the patient this morning d/t missed injection on 3/12/19. Patient apologized, said he was feeling ill and was unable to make it to the appointment. Rescheduled injection for Friday, 3/15/2019 at 1430.Sariah Rivers LPN

## 2019-03-15 ENCOUNTER — ALLIED HEALTH/NURSE VISIT (OUTPATIENT)
Dept: PSYCHIATRY | Facility: CLINIC | Age: 24
End: 2019-03-15
Attending: PSYCHIATRY & NEUROLOGY
Payer: COMMERCIAL

## 2019-03-15 DIAGNOSIS — F20.9 SCHIZOPHRENIA, UNSPECIFIED TYPE (H): Primary | ICD-10-CM

## 2019-03-15 PROCEDURE — 96372 THER/PROPH/DIAG INJ SC/IM: CPT | Mod: ZF

## 2019-03-15 PROCEDURE — 25000128 H RX IP 250 OP 636: Mod: ZF | Performed by: PSYCHIATRY & NEUROLOGY

## 2019-03-15 RX ADMIN — PALIPERIDONE PALMITATE 156 MG: 156 INJECTION INTRAMUSCULAR at 14:25

## 2019-03-15 NOTE — NURSING NOTE
"Jasen Wilmer,  1995, presented to the clinic today at the request of Dr. Rios, ordering provider for long-acting injectable Invega Sustenna 156 mg.    Observations:  1. Appearance: Patient wearing clean blue jeans, t shirt and sweater.   2. Mood: Good, talkative, engaged  3. Affect: congruent  4. Attitude: Good  5. Cooperation: Full  6. Side Effects: Denied. \"Everything is going good.\"  7. Education: None needed  8. Next Appointment: Pt stated they will call and make an appointment    The service provider today was rendered under the supervising provider of the day, Dr. Schneider, who was available for consultation as needed.    "

## 2019-04-11 ENCOUNTER — ALLIED HEALTH/NURSE VISIT (OUTPATIENT)
Dept: PSYCHIATRY | Facility: CLINIC | Age: 24
End: 2019-04-11
Payer: COMMERCIAL

## 2019-04-11 DIAGNOSIS — F20.9 SCHIZOPHRENIA, UNSPECIFIED TYPE (H): Primary | ICD-10-CM

## 2019-04-11 PROCEDURE — 96372 THER/PROPH/DIAG INJ SC/IM: CPT | Mod: ZF

## 2019-04-11 PROCEDURE — 25000128 H RX IP 250 OP 636: Mod: ZF | Performed by: PSYCHIATRY & NEUROLOGY

## 2019-04-11 RX ADMIN — PALIPERIDONE PALMITATE 156 MG: 156 INJECTION INTRAMUSCULAR at 14:00

## 2019-04-11 NOTE — NURSING NOTE
Jasen Guillen,  1995, presented to the clinic today at the request of Dr. Rios,  ordering provider for long-acting injectable Invega Sustenna.    OBSERVATIONS:  1.  Appearance: Dressed appropriately for the weather, dark blue pants, blue long sleeved shirt and a coat and hat.    2.  Mood: Friendly, talked about going on a retreat this weekend for school.    3.  Affect: Congruent  4.  Attitude: Engaged, talked about how this medication has improved his behavior, he says he likes this medication.    5.  Cooperation: Full  6.  Side Effects: Denied  7.  Education: None needed  8. Next appointment: 2019 @ 1400 for injection.      The service provided today was rendered under the supervising provider of the day, Dr. Schneider, who was available for consultation as needed.

## 2019-05-10 ENCOUNTER — ALLIED HEALTH/NURSE VISIT (OUTPATIENT)
Dept: PSYCHIATRY | Facility: CLINIC | Age: 24
End: 2019-05-10
Attending: PSYCHIATRY & NEUROLOGY
Payer: COMMERCIAL

## 2019-05-10 DIAGNOSIS — F20.9 SCHIZOPHRENIA, UNSPECIFIED TYPE (H): Primary | ICD-10-CM

## 2019-05-10 PROCEDURE — 96372 THER/PROPH/DIAG INJ SC/IM: CPT | Mod: ZF

## 2019-05-10 PROCEDURE — 25000128 H RX IP 250 OP 636: Mod: ZF | Performed by: PSYCHIATRY & NEUROLOGY

## 2019-05-10 RX ADMIN — PALIPERIDONE PALMITATE 156 MG: 156 INJECTION INTRAMUSCULAR at 13:41

## 2019-05-10 NOTE — NURSING NOTE
"Jasen Alexandre,  1995, presented to the clinic today at the request of Dr. Rios, ordering provider for long-acting injectable Invega Sustenna 156 mg.    Observations:  1. Appearance: Patient was wearing clean jeans and a pull over sweater. Patient's appointment was scheduled for 1200 and patient arrived at 1321. Patient apologized to writer for being late. Patient \"I've been stressed with finals week. I have 2 final papers left and then I will be done for the semester.\"  2. Mood: Good, Good eye contact, Talkative  3. Affect: Congruent  4. Attitude: Good  5. Cooperation: Full  6. Side Effects: Denied \"Everything is going well\"  7. Education: None needed  8. Next Appointment: 19 at 1400    The service provider today was rendered under the supervising provider of the day, Dr. Schneider, who was available for consultation as needed.    "

## 2019-06-07 ENCOUNTER — ALLIED HEALTH/NURSE VISIT (OUTPATIENT)
Dept: PSYCHIATRY | Facility: CLINIC | Age: 24
End: 2019-06-07
Attending: PSYCHIATRY & NEUROLOGY
Payer: COMMERCIAL

## 2019-06-07 ENCOUNTER — OFFICE VISIT (OUTPATIENT)
Dept: PSYCHIATRY | Facility: CLINIC | Age: 24
End: 2019-06-07
Attending: PSYCHOLOGIST
Payer: COMMERCIAL

## 2019-06-07 DIAGNOSIS — F29 PSYCHOSIS, UNSPECIFIED PSYCHOSIS TYPE (H): Primary | ICD-10-CM

## 2019-06-07 DIAGNOSIS — F20.9 SCHIZOPHRENIA, UNSPECIFIED TYPE (H): Primary | ICD-10-CM

## 2019-06-07 PROCEDURE — 25000128 H RX IP 250 OP 636: Mod: ZF | Performed by: PSYCHIATRY & NEUROLOGY

## 2019-06-07 PROCEDURE — 96372 THER/PROPH/DIAG INJ SC/IM: CPT | Mod: ZF

## 2019-06-07 RX ADMIN — PALIPERIDONE PALMITATE 156 MG: 156 INJECTION INTRAMUSCULAR at 14:00

## 2019-06-07 NOTE — NURSING NOTE
"Gato Guillen,  1995, presented to the Clinic today at the request of Dr. Rios ordering provider for long-acting injectable Invega Sustenna 156 mg.    OBSERVATIONS:    1. Appearance: Casually dressed wearing blue jeans and t-shirt. Pt. Stated \" I will be taking one summer class\" 3  2. Mood: Good, Friendly  3. Affect: Congruent   4. Attitude: Good  5. Cooperation: Full  6. Side Effects: Denied  7. Education: Non needed  8. Next Appointment:  @ 1200    The service provider today was rendered under the supervision of the day, Dr. Schneider, who was available for consultation as needed.     "

## 2019-06-11 NOTE — PROGRESS NOTES
First Episode Psychosis   Young Adult Group  [facility]      Patient: Jasen Guillen III (1995)     MRN: [id number]  Date:  6/07/19  Diagnosis: Psychosis, Unspecified    Number of Participants: 7  Group Time: 60 minutes  Facilitators: Helene Ortega PsyD, ; Howard Brizuela, Ph.D.; BURTON Lombardi;       The first episode group is based on the cognitive behavioral therapy model that uses psychoeducation, cognitive restructuring, problem solving techniques, and social skills.       Diagnostic criteria for group participation: History of psychosis      Group Topic for Today: Coping with distressing symptoms      Description: Active Participant  Mr. Guillen was present in group today where we focused on Coping with distressing symptoms by reviewing a potential distressing situation (riding the bus and being bothered by voices).  The patient actively participated in group discussion and patient demonstrated an appreciation of topic's application for their personal circumstances..  Jasen's emotional presentation was open and cooperative. Jasen's mood was even, and his affect was appropriate range of emotions.     Plan: Continue with group goals to minimize impact of psychotic symptoms and maintain stability  Please call or EPIC message with any questions or concerns.    HELIO Lombardi, [facility]  [phone]    I was present for and actively participated in the entire group therapy session. My observations of Jasen Guillen's progress and participation is reflected above.  Jasen Guillen participated appropriately in today's group therapy session and benefited from therapeutic milieu.  Helene Ortega Psy.D., L.P., 06/14/2019.

## 2019-07-05 ENCOUNTER — TELEPHONE (OUTPATIENT)
Dept: PSYCHIATRY | Facility: CLINIC | Age: 24
End: 2019-07-05

## 2019-07-05 NOTE — TELEPHONE ENCOUNTER
"7/8/2019, Patient called at 1100 and made an appointment today for 1130. Writer will discuss the need to see Dr. Rios and schedule a MFU.Sariah Rivers LPN    7/5/2019 at 1600 - Patient's roommate called and cancelled appointment stating \"Bill has a migraine\".Sariah Rivers LPN    Patient called to reschedule appointment for today 7/5/2019 at 1600.Sariah Rivers LPN    Writer left a DVM at [phone], pt.'s listed phone number, regarding missed injection appointment, Invega Sustenna 156 mg. Writer left direct phone number as return number to reschedule.Sariah Rivers LPN    "

## 2019-07-08 ENCOUNTER — ALLIED HEALTH/NURSE VISIT (OUTPATIENT)
Dept: PSYCHIATRY | Facility: CLINIC | Age: 24
End: 2019-07-08
Payer: COMMERCIAL

## 2019-07-08 DIAGNOSIS — F20.9 SCHIZOPHRENIA, UNSPECIFIED TYPE (H): Primary | ICD-10-CM

## 2019-07-08 PROCEDURE — 96372 THER/PROPH/DIAG INJ SC/IM: CPT | Mod: ZF

## 2019-07-08 PROCEDURE — 25000128 H RX IP 250 OP 636: Mod: ZF | Performed by: PSYCHIATRY & NEUROLOGY

## 2019-07-08 RX ADMIN — PALIPERIDONE PALMITATE 156 MG: 156 INJECTION INTRAMUSCULAR at 12:00

## 2019-07-08 NOTE — NURSING NOTE
Jasen Guillen,  1995, presented to the clinic today at the request of Dr. Rios,  ordering provider for long-acting injectable Invega Sustenna 156 mg.    OBSERVATIONS:  1.  Appearance: casually dressed in clean shorts and polo shirt. Pt apologized for missed appointments last week  2.  Mood: Good, talkative, friendly  3.  Affect: Congruent  4.  Attitude: Good, engaged   5.  Cooperation: Full  6.  Side Effects: Denied  7.  Education: Importance of q28d injection and the need to make MFU with Dr. Rios  8. Next appointment: 2019 1230 with Dr. Russo and injection to follow at 1300    The service provided today was rendered under the supervising provider of the day, Dr. Purcell, who was available for consultation as needed.

## 2019-08-02 ENCOUNTER — TELEPHONE (OUTPATIENT)
Dept: PSYCHIATRY | Facility: CLINIC | Age: 24
End: 2019-08-02

## 2019-08-02 NOTE — TELEPHONE ENCOUNTER
On 08/02/19, patient missed his scheduled IM injection for Invega Sustenna 156 mg . This writer called [phone], and left a dvm asking the patient to call [phone] to reschedule. A note was routed to Dr. Rios and Jinny Bush RN to notify them of the missed injection appointment. Ruma Dee/JOVITA

## 2019-08-06 ENCOUNTER — ALLIED HEALTH/NURSE VISIT (OUTPATIENT)
Dept: PSYCHIATRY | Facility: CLINIC | Age: 24
End: 2019-08-06
Attending: PSYCHIATRY & NEUROLOGY
Payer: COMMERCIAL

## 2019-08-06 DIAGNOSIS — F20.9 SCHIZOPHRENIA, UNSPECIFIED TYPE (H): Primary | ICD-10-CM

## 2019-08-06 PROCEDURE — 25000128 H RX IP 250 OP 636: Mod: ZF | Performed by: PSYCHIATRY & NEUROLOGY

## 2019-08-06 PROCEDURE — 96372 THER/PROPH/DIAG INJ SC/IM: CPT | Mod: ZF

## 2019-08-06 RX ADMIN — PALIPERIDONE PALMITATE 156 MG: 156 INJECTION INTRAMUSCULAR at 15:15

## 2019-08-06 NOTE — PATIENT INSTRUCTIONS
Thank you for coming to the PSYCHIATRY CLINIC.    Lab Testing:  If you had lab testing today and your results are reassuring or normal they will be mailed to you or sent through App47 within 7 days.   If the lab tests need quick action we will call you with the results.  The phone number we will call with results is # [phone] (home) . If this is not the best number please call our clinic and change the number.    Medication Refills:  If you need any refills please call your pharmacy and they will contact us. Our fax number for refills is [phone]. Please allow three business for refill processing.   If you need to  your refill at a new pharmacy, please contact the new pharmacy directly. The new pharmacy will help you get your medications transferred.     Scheduling:  If you have any concerns about today's visit or wish to schedule another appointment please call our office during normal business hours [phone] (8-5:00 M-F)    Contact Us:  Please call [phone] during business hours (8-5:00 M-F).  If after clinic hours, or on the weekend, please call  [phone].    Financial Assistance [phone]  [facility] Billing [phone]  Central Billing Office, [facility]: [phone]  [facility]illing [phone]  Medical Records [phone]      MENTAL HEALTH CRISIS NUMBERS:  [facility]:   [facility] - [phone]   Crisis Residence [facility] - [phone]   Walk-In Counseling [facility] [phone]   COPE 24/7 [place] Mobile Team for Adults - [[phone]]; Child - [[phone]]        [facility]:   [facility] - [phone]   Walk-in counseling [facility] - [phone]   Walk-in counseling [facility] - [phone]   Crisis Residence [facility] - [phone]   Urgent Care Adult Mental Health:   --Drop-in, 24/7 crisis line, and [employer] Mobile Team  [[phone]]    CRISIS TEXT LINE: Text [phone] from anywhere, anytime, any crisis 24/7;    OR SEE www.crisistextline.org     Poison Control Center - [phone]    CHILD: [facility] needs assessment team - [phone]     [facility] - [phone]; or [facility] Lif[facility] - [phone]    If you have a medical emergency please call 911or go to the nearest ER.                    _____________________________________________    Again thank you for choosing PSYCHIATRY CLINIC and please let us know how we can best partner with you to improve you and your family's health.  You may be receiving a survey in the mail regarding this appointment. We would love to have your feedback, both positive and negative, so please fill out the survey and return it using the provided envelope. The survey is done by an external company, so your answers are anonymous. '

## 2019-08-06 NOTE — NURSING NOTE
"WilmerJasen ,  1995, presented to the Clinic today at the request of Dr. Rios ordering provider for long-acting injectable Invega Sustenna 156 mg    OBSERVATIONS:    1. Appearance: Casually dressed wearing short and white t-shirt.   2. Mood: Good  3. Affect: Congruent   4. Attitude: Good, talkative \"I had to miss my previews appointment because I had a final paper that I had to turn in for my class\"   5. Cooperation: full  6. Side Effects: denied  7. Education: none needed  8. Next Appointment: 2019 @ 1300    The service provider today was rendered under the supervision of the day, Dr. Purcell, who was available for consultation as needed.     Clinic Administered Medication Documentation      Injectable Medication Documentation    Patient was given Invega Sustenna 156 mg. Prior to medication administration, verified patients identity using patient s name and date of birth. Please see MAR and medication order for additional information. Patient instructed to report any adverse reaction to staff immediately .      Was entire vial of medication used? Yes  Vial/Syringe: Single dose vial  Expiration Date:  10/2020  Was this medication supplied by the patient? No    "

## 2019-09-06 ENCOUNTER — OFFICE VISIT (OUTPATIENT)
Dept: PSYCHIATRY | Facility: CLINIC | Age: 24
End: 2019-09-06
Attending: PSYCHIATRY & NEUROLOGY
Payer: COMMERCIAL

## 2019-09-06 ENCOUNTER — ALLIED HEALTH/NURSE VISIT (OUTPATIENT)
Dept: PSYCHIATRY | Facility: CLINIC | Age: 24
End: 2019-09-06
Payer: COMMERCIAL

## 2019-09-06 VITALS — SYSTOLIC BLOOD PRESSURE: 97 MMHG | HEART RATE: 71 BPM | WEIGHT: 213 LBS | DIASTOLIC BLOOD PRESSURE: 63 MMHG

## 2019-09-06 DIAGNOSIS — F20.9 SCHIZOPHRENIA, UNSPECIFIED TYPE (H): ICD-10-CM

## 2019-09-06 DIAGNOSIS — F20.0 ACUTE EXACERBATION OF CHRONIC PARANOID SCHIZOPHRENIA (H): ICD-10-CM

## 2019-09-06 DIAGNOSIS — F20.9 SCHIZOPHRENIA, UNSPECIFIED TYPE (H): Primary | ICD-10-CM

## 2019-09-06 DIAGNOSIS — Z79.899 ENCOUNTER FOR LONG-TERM (CURRENT) USE OF MEDICATIONS: Primary | ICD-10-CM

## 2019-09-06 LAB
CHOLEST SERPL-MCNC: 197 MG/DL
HDLC SERPL-MCNC: 52 MG/DL
LDLC SERPL CALC-MCNC: 121 MG/DL
NONHDLC SERPL-MCNC: 145 MG/DL
TRIGL SERPL-MCNC: 118 MG/DL

## 2019-09-06 PROCEDURE — 25000128 H RX IP 250 OP 636: Mod: ZF | Performed by: PSYCHIATRY & NEUROLOGY

## 2019-09-06 PROCEDURE — G0463 HOSPITAL OUTPT CLINIC VISIT: HCPCS | Mod: ZF

## 2019-09-06 PROCEDURE — 36415 COLL VENOUS BLD VENIPUNCTURE: CPT | Performed by: PSYCHIATRY & NEUROLOGY

## 2019-09-06 PROCEDURE — 80061 LIPID PANEL: CPT | Performed by: PSYCHIATRY & NEUROLOGY

## 2019-09-06 PROCEDURE — 96372 THER/PROPH/DIAG INJ SC/IM: CPT | Mod: ZF

## 2019-09-06 RX ORDER — LORAZEPAM 0.5 MG/1
0.5 TABLET ORAL EVERY 8 HOURS PRN
Qty: 90 TABLET | Refills: 1 | Status: CANCELLED | OUTPATIENT
Start: 2019-09-06

## 2019-09-06 RX ORDER — OMEGA-3/DHA/EPA/FISH OIL 200-300 MG
2 CAPSULE ORAL 2 TIMES DAILY
Qty: 120 CAPSULE | Refills: 0 | Status: CANCELLED | OUTPATIENT
Start: 2019-09-06

## 2019-09-06 RX ORDER — PALIPERIDONE 3 MG/1
3 TABLET, EXTENDED RELEASE ORAL DAILY
Qty: 30 TABLET | Refills: 0 | Status: CANCELLED | OUTPATIENT
Start: 2019-09-06

## 2019-09-06 RX ADMIN — PALIPERIDONE PALMITATE 156 MG: 156 INJECTION INTRAMUSCULAR at 14:00

## 2019-09-06 ASSESSMENT — PAIN SCALES - GENERAL: PAINLEVEL: NO PAIN (0)

## 2019-09-06 NOTE — PROGRESS NOTES
"      PSYCHIATRY CLINIC PROGRESS NOTE     CC:  \"I have had a good summer.\"    INTERIM HISTORY                                                   Jasen Guillen III is a 23 year old male with a diagnosis of schizophrenia (for past ~ 4 years) who comes in for a scheduled appointment today. We met for medication eval and management, as well as 20 minutes of supportive psychotherapy.    Patient has been receiving paliperidone injections, 156 mg, a decrease from his original dose (decreased about a year ago).  Overall, he has done well on this lower dose.  No ocular movements, no motor side effects. No repetitive pinching behavior that he has had in past.    Has not needed to take oral paliperidone or oral lorazepam for many months.    Has done well in classes, including summer classes, and was on [employer]'s list.        PSYCH ROS:    Positive Psychotic symptoms:      No recurrence of ideas of reference. Does not report any feelings of paranoia or being in danger.  Denies hallucinations or disorganized thinking.  No changes to his voice, no agitation, no scratching.    Negative psychotic symptoms:    Anhedonia and amotivation are resolved.  Patient engaged with housemates and social life.    Mood symptoms:     Sx of depression and anxiety remain resolved.   No feelings of hopelessness or of self-harm. No SI.    Cognition:    Attention and concentration good. Has done well in his classes.    Substance use: Not present.    Obsessive-compulsive symptoms: Describes occasional fleeing unwanted graphic sexual imagery that is upsetting, does not impair his functioning, happens 2-3 times per week. Says it has always been there but he is more aware of it now.  Can usually put it out of his mind. No compulsive behavior in response.    Motor symptoms:  No further tic-like behavior, as described in prior notes.    MEDICAL ROS:       Constitutional: No fever, fatigue.  Has gained weight over the summer.  Thinks this is because he was " "not exercising as much.  Neurologic: Denies problems with salivation, muscle stiffness, motor restlessness. Eye spasms have resolved.  + tics and motor restlessness. No dizziness.  HEENT: Mild dry mouth. No dry eyes.  Cardiovascular: No chest pain or palpitations.  Respiratory: No shortness of breath, dyspnea on exertion, or wheezing.  Gastrointestinal: No nausea or constipation  : No symptoms reported  Musculoskeletal:No musculoskeletal difficulties.  Skin: WNL.  Endocrine: No abnormalities.    PSYCHOSOCIAL HX:    Has a close relationship withroommates and friends in [employer] group-- is able to share his personal hx with them and get support.    Has been visiting [employer] of different orders. Plans to enter the novitiate. Has felt most \"at home\" with the [employer].    Has made plans to attend a [employer] and take classes for grad school next yearat their associated campus.      PAST PSYCH HISTORY:    Received intensive treatment 4 years ago for an enduring episode of psychosis that was characterized predominantly by ideas of reference (feeling that he was being talked about  over the Internet) he also experienced disorganized thinking and disorganized behavior.    Of note, this patient's past andrecent presentation has been characterized by the presence of mild chronic dyskinetic movements, now much improved.    ALLERGY                                Review of patient's allergies indicates no known allergies.  MEDICATIONS                               Paliperidone 156 m IM q 28 days  d         VITALS     Blood pressure 97/63, pulse 71, weight 96.6 kg (213 lb).      MENTAL STATUS EXAM                                                             Orientation:  x4  Gait and Station:  Walks smoothly in squires, normal arm swing, normal gait.  No UE stifness. No dyskinetic movements observable in face and hands. No vocalizations. No scratching. No masked facies.  Motor:  Shows slllightly restless " "movements in legs.  Alertness: Alert, oriented.  Appearance: Neatly and casually dressed.  Behavior/Demeanor: Calm, cooperative, pleasant, with excellent eye contact.  Appropriately interactive.  Speech: Normal rate and rhythm, normal volume.  Language: Language is cogent, descriptive of his activities  Psychomotor: Normal PM speed. No slowing observed.   Mood: \"really good.\"  Affect:Full range, expressive, appropriate to content. Shows sense of humor-- smiles and responds to humor.  Thought Process/Associations: Linear, logical.  Thought Content: Denies delusional thoughts, ideas of reference, SI. HI.    Perception:  Denies hallucinations. No illusions.  Insight: Excellent.  Judgment: Excellent  Cognition:  Intact  Recent and Remote Memory: Intact  Attention Span and Concentration:  Intact during exam  Fund of Knowledge:excellent        DIAGNOSIS     Schizophrenia, now stable after brief flare-up about 3 months ago of motor/mood sx.  Brief episode of tic-like sx 9 months ago that resolved well with a single dose of 0.5 mg lorazepam with no further recurrence.   Major depression, in remission.  Extrapyramidal side effects (oculogyrc spasms), resolved spontaneously, no longer on Cogentin.   Mild dyskinesias and possible akathisia in past. Difficult to disentangle what might be medication induced from what might be primary pathology. There is a strong motor system presentation in this pt. Sx not a problem today.     ASSESSMENT                                     Schizophrenia, doing well overall on current lower dose of Invega injectable; feeling very well cognitively and symptomatically.    In terms of the depressive symptoms, the patient experienced some recurrence of sx about 12 months ago, and possibly again4 months ago, but now resolved. Does not want to take sertraline.     Also of note, his prior negative symptoms of lack of motivation, lack of energy, and anhedonia, now are resolved, especially on lower dose " "of medication. The patient reports that he is experiencing strong motivation and enjoyment and is espcially excited about his plans for grad school in philosophy.    Cognitive symptoms:Cognition is greatly improved based on subjective self-report (patient recently participated in cognitive training study).       PLAN                                                                                                       1) Positive Psychotic Sx:   Continue on current regimen of reduced dose of injectable paliperidone 156 mg po q month, now has been on it one year.                  2) Negative psychotic symptoms:      Resolved.    3) Mood and anxiety symptoms:   These are now resolved.    5)  RTC 8 weeks        TREATMENT RISK STATEMENT:  The risks, benefits, alternatives and potential adverse effects have been discussed and are understood by the patient/ patient's guardian. The pt understands the risks of using street drugs or alcohol.  There are no medical contraindications, the pt agrees to treatment with the ability to do so.  The patient understands to call 911 or come to the nearest ED if life threatening or urgent symptoms present.    PSYCHIATRY CLINIC INDIVIDUAL PSYCHOTHERAPY NOTE   Length of session: 20 minutes, 9:40 to 10:00     Subjective: \"Things are going great.\"   Plan:         Continue supportive psychotherapy during monthly medication management visits.         Psychotherapy services during this visit included  myself and Jasen Guillen.  Issues addressed in therapy included: working on philosophy paper, change in major     TREATMENT  PLAN          SYMPTOMS;PROBLEMS   MEASURABLE GOALS;    FUNCTIONAL IMPROVEMENT INTERVENTIONS;    GAINS MADE DISCHARGE CRITERIA   Low energy, lack of motivation Patient will experience a significant improvement in energy and motivation, and will be able to engage in outsidesocializing activities as well as classwork Supportive Psychotherapy once per month    Patient enjoying " active social life REsolved   Ideas of reference during times of stress Patient will no longer experience ideas of reference Monthly injectable paliperidone  CBT techniques  IOR improved after recent flare-up Two years without symptoms, followed by two years of monitoring without symptoms relapse   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      No tics

## 2019-09-06 NOTE — NURSING NOTE
Clinic Administered Medication Documentation      Injectable Medication Documentation    Patient was given Gentamicin Sulfate  and Invega Sustenna 156 mg. Prior to medication administration, verified patients identity using patient s name and date of birth. Please see MAR and medication order for additional information. Patient instructed to remain in clinic for 15 minutes and report any adverse reaction to staff immediately .      Was entire vial of medication used? Yes  Vial/Syringe: Syringe  Expiration Date:  10/2020  Was this medication supplied by the patient? No      Jasen Guillen,  1995, presented to the clinic today at the request of Dr. Rios,  ordering provider for long-acting injectable Invega Sustenna 156 mg.    OBSERVATIONS:  1.  Appearance: Casually dressed in clean jeans and button shirt. Pt was looking forward to his last semester at the  and graduating. Pt said he will be moving back to [place] to be closer to family and continue his education.  2.  Mood: Good, talkative, friendly  3.  Affect: Congruent  4.  Attitude: Good, pleasant, engaged  5.  Cooperation: Full  6.  Side Effects: Denied  7.  Education: None needed  8. Next appointment: 10/4/2019 at 1130    The service provided today was rendered under the supervising provider of the day, Dr. Schneider, who was available for consultation as needed.

## 2019-09-06 NOTE — NURSING NOTE
Chief Complaint   Patient presents with     Recheck Medication     Schizophrenia, unspecified type

## 2019-10-04 ENCOUNTER — ALLIED HEALTH/NURSE VISIT (OUTPATIENT)
Dept: PSYCHIATRY | Facility: CLINIC | Age: 24
End: 2019-10-04
Payer: COMMERCIAL

## 2019-10-04 DIAGNOSIS — Z23 NEED FOR PROPHYLACTIC VACCINATION AND INOCULATION AGAINST INFLUENZA: Primary | ICD-10-CM

## 2019-10-04 DIAGNOSIS — F20.9 SCHIZOPHRENIA, UNSPECIFIED TYPE (H): Primary | ICD-10-CM

## 2019-10-04 PROCEDURE — 25000128 H RX IP 250 OP 636: Mod: ZF | Performed by: PSYCHIATRY & NEUROLOGY

## 2019-10-04 PROCEDURE — 25000128 H RX IP 250 OP 636: Mod: ZF

## 2019-10-04 PROCEDURE — G0008 ADMIN INFLUENZA VIRUS VAC: HCPCS | Mod: ZF

## 2019-10-04 PROCEDURE — 90686 IIV4 VACC NO PRSV 0.5 ML IM: CPT | Mod: ZF

## 2019-10-04 PROCEDURE — 96372 THER/PROPH/DIAG INJ SC/IM: CPT | Mod: ZF

## 2019-10-04 RX ADMIN — PALIPERIDONE PALMITATE 156 MG: 156 INJECTION INTRAMUSCULAR at 13:00

## 2019-10-04 NOTE — NURSING NOTE
Clinic Administered Medication Documentation      Injectable Medication Documentation    Patient was given Invega Ssutenna 156 mg. Prior to medication administration, verified patients identity using patient s name and date of birth. Please see MAR and medication order for additional information. Patient instructed to remain in clinic for 15 minutes and report any adverse reaction to staff immediately .      Was entire vial of medication used? Yes  Vial/Syringe: Single dose vial  Expiration Date:    Was this medication supplied by the patient? No      Gato Guillen,  1995, presented to the clinic today at the request of Dr Rios,  ordering provider for long-acting injectable Invega Sustenna 234 mg.    OBSERVATIONS:  1.  Appearance: Casually dressed in jeans and sweatshirt.   2.  Mood: Good, talkative, friendly  3.  Affect: Congruent  4.  Attitude: Good, engaged, good eye contact  5.  Cooperation: Full  6.  Side Effects: Denied  7.  Education: Ice at injection site for pain  8. Next appointment: 19 at 1130    The service provided today was rendered under the supervising provider of the day, Dr Purcell, who was available for consultation as needed.

## 2019-10-04 NOTE — NURSING NOTE
Clinic Administered Medication Documentation    MEDICATION LIST:   Injectable Medication Documentation    Patient was given Fluzone. Prior to medication administration, verified patients identity using patient s name and date of birth. Please see MAR and medication order for additional information. Patient instructed to remain in clinic for 15 minutes and report any adverse reaction to staff immediately .      Was entire vial of medication used? Yes  Vial/Syringe: Syringe  Expiration Date:  02/2021  Was this medication supplied by the patient? No

## 2019-10-04 NOTE — TELEPHONE ENCOUNTER
Writer reviewed pt's chart. He is due for refills of Invega Sustenna 156 mg. Per provider's last office visit note, pt is to continue with this medication. Refills entered for 6 months.

## 2019-11-01 ENCOUNTER — TELEPHONE (OUTPATIENT)
Dept: PSYCHIATRY | Facility: CLINIC | Age: 24
End: 2019-11-01

## 2019-11-01 NOTE — TELEPHONE ENCOUNTER
Writer left a DVM on pt listed phone number regarding missed HARRELL today. Writer left direct line for callback.Sariah Rivers LPN

## 2019-11-04 ENCOUNTER — ALLIED HEALTH/NURSE VISIT (OUTPATIENT)
Dept: PSYCHIATRY | Facility: CLINIC | Age: 24
End: 2019-11-04
Payer: COMMERCIAL

## 2019-11-04 DIAGNOSIS — F20.9 SCHIZOPHRENIA, UNSPECIFIED TYPE (H): Primary | ICD-10-CM

## 2019-11-04 PROCEDURE — 25000128 H RX IP 250 OP 636: Mod: ZF | Performed by: PSYCHIATRY & NEUROLOGY

## 2019-11-04 PROCEDURE — 96372 THER/PROPH/DIAG INJ SC/IM: CPT | Mod: ZF

## 2019-11-04 RX ADMIN — PALIPERIDONE PALMITATE 156 MG: 156 INJECTION INTRAMUSCULAR at 13:00

## 2019-11-04 NOTE — NURSING NOTE
Clinic Administered Medication Documentation    MEDICATION LIST:   Injectable Medication Documentation    Patient was given Invega Sustena 156 mg. Prior to medication administration, verified patients identity using patient s name and date of birth. Please see MAR and medication order for additional information. Patient instructed to remain in clinic for 15 minutes and report any adverse reaction to staff immediately .      Was entire vial of medication used? Yes  Vial/Syringe: Single dose vial  Expiration Date:    Was this medication supplied by the patient? No      Gato Guillen,  1995, presented to the clinic today at the request of Dr. Rios, ordering provider for long-acting injectable Invega Sustena 156 mg.    OBSERVATIONS:  1.  Appearance: Casually dressed, clean jeans and light weight jacket.  2.  Mood: Good, talkative   3.  Affect: congruant  4.  Attitude: good   5.  Cooperation: full  6.  Side Effects: Denied  7.  Education: none needed  8. Next appointment: 19 1400 with Dr. Rios with injection to follow at 1430    The service provided today was rendered under the supervising provider of the day, Dr. Purcell, who was available for consultation as needed.

## 2019-12-06 ENCOUNTER — ALLIED HEALTH/NURSE VISIT (OUTPATIENT)
Dept: PSYCHIATRY | Facility: CLINIC | Age: 24
End: 2019-12-06
Attending: PSYCHIATRY & NEUROLOGY
Payer: COMMERCIAL

## 2019-12-06 ENCOUNTER — TELEPHONE (OUTPATIENT)
Dept: PHARMACY | Facility: CLINIC | Age: 24
End: 2019-12-06

## 2019-12-06 ENCOUNTER — TELEPHONE (OUTPATIENT)
Dept: PSYCHIATRY | Facility: CLINIC | Age: 24
End: 2019-12-06

## 2019-12-06 VITALS — HEART RATE: 99 BPM | WEIGHT: 217.4 LBS | DIASTOLIC BLOOD PRESSURE: 75 MMHG | SYSTOLIC BLOOD PRESSURE: 117 MMHG

## 2019-12-06 DIAGNOSIS — F20.9 SCHIZOPHRENIA, UNSPECIFIED TYPE (H): Primary | ICD-10-CM

## 2019-12-06 PROCEDURE — 25000128 H RX IP 250 OP 636: Mod: ZF | Performed by: PSYCHIATRY & NEUROLOGY

## 2019-12-06 PROCEDURE — 96372 THER/PROPH/DIAG INJ SC/IM: CPT | Mod: ZF

## 2019-12-06 PROCEDURE — G0463 HOSPITAL OUTPT CLINIC VISIT: HCPCS | Mod: ZF

## 2019-12-06 RX ORDER — PALIPERIDONE 9 MG/1
9 TABLET, EXTENDED RELEASE ORAL DAILY
Qty: 30 TABLET | Refills: 1 | Status: SHIPPED | OUTPATIENT
Start: 2019-12-06 | End: 2020-04-03

## 2019-12-06 RX ADMIN — PALIPERIDONE PALMITATE 156 MG: 156 INJECTION INTRAMUSCULAR at 15:00

## 2019-12-06 ASSESSMENT — PAIN SCALES - GENERAL: PAINLEVEL: NO PAIN (0)

## 2019-12-06 NOTE — TELEPHONE ENCOUNTER
Information requested on paliperidone oral equivalent to Sustenna 156mg Q4 weeks. Pt will be [place] to oral while out of [place].  Paliperidone 9mg = Invega Sustenna 156mg.   Rx for paliperidone 9mg daily, #30 1 refill sent to [place] pharmacy per verbal approval by Lynnette Rios.

## 2019-12-06 NOTE — TELEPHONE ENCOUNTER
Writer received VORB from the provider to continue with Invega Sustenna 156 mg q28 days. Medication double checked by this writer and will be administered by Sariah Rivers LPN.

## 2019-12-06 NOTE — NURSING NOTE
Chief Complaint   Patient presents with     Recheck Medication     Schizophrenia, unspecified type

## 2019-12-06 NOTE — PROGRESS NOTES
"Graduating this month, home to [place], then back in early Feb    Joining order in [place] later in 2020    WIll miss next injection, wants pills          PSYCHIATRY CLINIC PROGRESS NOTE     CC:  \"I am graduating this month.\"    INTERIM HISTORY                                                   Jasen Guillen III is a 23 year old male with a diagnosis of schizophrenia (for past ~ 4 years) who comes in for a scheduled appointment today. We met for medication eval and management, as well as 20 minutes of supportive psychotherapy.    Patient has been receiving paliperidone injections, 156 mg, a decrease from his original dose (decreased over a year ago).  Overall, he has done very well on this lower dose.  No psychotic sx, no ocular movements, no motor side effects. No repetitive pinching behavior that he has had in past.    Has not needed to take oral paliperidone or oral lorazepam for many months.    Has done well in classes, including summer classes, and was on [employer] list.      Will graduate this month, go home to [place] for about 2 months, then will return here for a few months as he prepares to enter the novitiate.    Reports that it is hard to set up th Invega injectionsin [place], so would like to switch to oral Invega in a month, while he is in [place], then get back on injection when he returns.      PSYCH ROS:    Positive Psychotic symptoms:      No recurrence of ideas of reference. Does not report any feelings of paranoia or being in danger.  Denies hallucinations or disorganized thinking.  No changes to his voice, no agitation, no scratching.    Negative psychotic symptoms:    Anhedonia and amotivation are resolved.  Patient engaged with housemates and social life.    Mood symptoms:     Sx of depression and anxiety remain resolved.   No feelings of hopelessness or of self-harm. No SI.    Cognition:    Attention and concentration good. Has done very well in his classes.    Substance use: Not " present.    Obsessive-compulsive symptoms: No further description of unwanted graphic sexual imagery that is upsetting. No compulsive behavior.    Motor symptoms:  No further tic-like behavior, as described in prior notes.    MEDICAL ROS:       Constitutional: No fever, fatigue.    Neurologic: Denies problems with salivation, muscle stiffness, motor restlessness. Eye spasms have resolved.  + tics and motor restlessness. No dizziness.  HEENT: Mild dry mouth. No dry eyes.  Cardiovascular: No chest pain or palpitations.  Respiratory: No shortness of breath, dyspnea on exertion, or wheezing.  Gastrointestinal: No nausea or constipation  : No symptoms reported  Musculoskeletal:No musculoskeletal difficulties.  Skin: WNL.  Endocrine: No abnormalities.    PSYCHOSOCIAL HX:    Has a close relationship withroommates and friends in [employer] group-- is able to share his personal hx with them and get support.    Has been visiting [email or web address] of different orders. Plans to enter the novitiate at a campus near [place].       PAST PSYCH HISTORY:    Received intensive treatment 4 years ago for an enduring episode of psychosis that was characterized predominantly by ideas of reference (feeling that he was being talked about  over the Internet) he also experienced disorganized thinking and disorganized behavior.    Of note, this patient's past andrecent presentation has been characterized by the presence of mild chronic dyskinetic movements, now much improved.    ALLERGY                                Review of patient's allergies indicates no known allergies.  MEDICATIONS                               Paliperidone 156 m IM q 28 days           VITALS       Blood pressure 117/75, pulse 99, weight 98.6 kg (217 lb 6.4 oz).    MENTAL STATUS EXAM                                                             Orientation:  x4  Gait and Station:  Walks smoothly in squires, normal arm swing, normal gait.  No UE stifness. No dyskinetic  "movements observable in face and hands. No vocalizations. No scratching. No masked facies.  Motor:  Shows sllightly restless movements in legs.  Alertness: Alert, oriented.  Appearance: Neatly and casually dressed.  Behavior/Demeanor: Calm, cooperative, pleasant, with excellent eye contact.  Appropriately interactive.  Speech: Normal rate and rhythm, normal volume.  Language: Language is cogent, descriptive of his activities  Psychomotor: Normal PM speed. No slowing observed.   Mood: \"really good.\"  Affect: Full range, expressive, appropriate to content. Smiles and responds to humor.  Thought Process/Associations: Linear, logical.  Thought Content: Denies delusional thoughts, ideas of reference, SI. HI.    Perception:  Denies hallucinations. No illusions.  Insight: Excellent.  Judgment: Excellent  Cognition:  Intact  Recent and Remote Memory: Intact  Attention Span and Concentration:  Intact during exam  Fund of Knowledge:excellent        DIAGNOSIS     Schizophrenia, now stable after brief flare-up about 6 months ago of motor/mood sx.  Brief episode of tic-like sx 12 months ago that resolved well with a single dose of 0.5 mg lorazepam with no further recurrence.   Major depression, in remission.  Extrapyramidal side effects (oculogyrc spasms), resolved spontaneously, no longer on Cogentin.   Mild dyskinesias and possible akathisia in past. Difficult to disentangle what might be medication induced from what might be primary pathology. There is a strong motor system presentation in this pt. Sx not a problem today.     ASSESSMENT                                     Schizophrenia, doing well overall on current lower dose of Invega injectable; feeling very well cognitively and symptomatically.    In terms of the depressive symptoms, the patient experienced some recurrence of sx about 12 months ago, and possibly again 8 months ago, but now resolved.     Also of note, his prior negative symptoms of lack of motivation, lack " "of energy, and anhedonia, now are resolved, especially on lower dose of medication. The patient reports that he is experiencing strong motivation and enjoyment and is espcially excited about his plans for grad school in philosophy.    Cognitive symptoms:Cognition is greatly improved based on subjective self-report (patient recently participated in cognitive training study).       PLAN                                                                                                       1) Positive Psychotic Sx:   Will prescribe oral invega 9 mg po every day, that pt may start taking in about a month, when injection begins to wear off.  He will continue on oral invega until he returns to us in February.    Then we will go back to injectable paliperidone 156 mg po q month.              2) Negative psychotic symptoms:      Resolved.    3) Mood and anxiety symptoms:   Resolved.    5)  RTC in February 2020.        TREATMENT RISK STATEMENT:  The risks, benefits, alternatives and potential adverse effects have been discussed and are understood by the patient/ patient's guardian. The pt understands the risks of using street drugs or alcohol.  There are no medical contraindications, the pt agrees to treatment with the ability to do so.  The patient understands to call 911 or come to the nearest ED if life threatening or urgent symptoms present.    PSYCHIATRY CLINIC INDIVIDUAL PSYCHOTHERAPY NOTE   Length of session: 20 minutes, 2:10 to 2:30     Subjective: \"I am excited about graduationt.\"   Plan:         Continue supportive psychotherapy when pt returns for monthly medication management visits.         Psychotherapy services during this visit included  myself and Jasen Guillen.  Issues addressed in therapy included: working on philosophy paper, change in major     TREATMENT  PLAN          SYMPTOMS;PROBLEMS   MEASURABLE GOALS;    FUNCTIONAL IMPROVEMENT INTERVENTIONS;    GAINS MADE DISCHARGE CRITERIA   Low energy, lack of " motivation Patient will experience a significant improvement in energy and motivation, and will be able to engage in outsidesocializing activities as well as classwork Supportive Psychotherapy once per month    Patient enjoying active social life Resolved   Ideas of reference during times of stress Patient will no longer experience ideas of reference Monthly injectable paliperidone  CBT techniques  IOR improved after recent flare-up Two years without symptoms, followed by two years of monitoring without symptoms relapse   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      No tics

## 2019-12-06 NOTE — NURSING NOTE
Clinic Administered Medication Documentation      Injectable Medication Documentation    Patient was given Invega Sustenna 156 mg. Prior to medication administration, verified patients identity using patient s name and date of birth. Please see MAR and medication order for additional information. Patient instructed to remain in clinic for 15 minutes and report any adverse reaction to staff immediately .      Was entire vial of medication used? Yes  Vial/Syringe: Syringe  Expiration Date:  2021  Was this medication supplied by the patient? No      Gato Guillen,  1995, presented to the clinic today at the request of Dr. Rios,  ordering provider for long-acting injectable Invega Sustenna 156 mg.    OBSERVATIONS:  1.  Appearance: Casually dressed in weather appropriate clothing. Pt shared that he is excited about school being done soon and going to see his family in [place] for the holidays. Pt said Dr. Rios will be giving him oral Invega for him to use since he wont be back in [place] until Feb. And will miss his 2020 HARRELL. Pt will call to schedule his Feb HARRELL when he returns  2.  Mood: Good, talkative, engaged  3.  Affect: Congruent  4.  Attitude: Good, pleasant, good eye contact  5.  Cooperation: Full  6.  Side Effects: Denied  7.  Education: None needed  8. Next appointment: Will call to schedule his 2020 appointment  9. Location: Left Deltoid    The service provided today was rendered under the supervising provider of the day, Dr. Schneider, who was available for consultation as needed.

## 2020-02-06 ENCOUNTER — ALLIED HEALTH/NURSE VISIT (OUTPATIENT)
Dept: PSYCHIATRY | Facility: CLINIC | Age: 25
End: 2020-02-06
Attending: PSYCHIATRY & NEUROLOGY
Payer: COMMERCIAL

## 2020-02-06 DIAGNOSIS — F20.9 SCHIZOPHRENIA, UNSPECIFIED TYPE (H): Primary | ICD-10-CM

## 2020-02-06 PROCEDURE — 96372 THER/PROPH/DIAG INJ SC/IM: CPT | Mod: ZF

## 2020-02-06 PROCEDURE — 25000128 H RX IP 250 OP 636: Mod: ZF | Performed by: PSYCHIATRY & NEUROLOGY

## 2020-02-06 RX ADMIN — PALIPERIDONE PALMITATE 156 MG: 156 INJECTION INTRAMUSCULAR at 12:30

## 2020-02-06 NOTE — NURSING NOTE
"Clinic Administered Medication Documentation      Injectable Medication Documentation    Patient was given Invega Sustenna 156 MG. Prior to medication administration, verified patients identity using patient s name and date of birth. Please see MAR and medication order for additional information. Patient instructed to remain in clinic for 15 minutes and report any adverse reaction to staff immediately .      Was entire vial of medication used? Yes  Vial/Syringe: Syringe  Expiration Date:    Was this medication supplied by the patient? No      Jasen Guillen,  1995, presented to the clinic today at the request of Dr. Rios,  ordering provider for long-acting injectable Invega Sustenna 156 MG    OBSERVATIONS:  1.  Appearance: good personal hygiene with clean clothing appropriate for the weather.   2.  Mood: good  3.  Affect: congruent  4.  Attitude: good  5.  Cooperation: full - pt discussed his time in [place]. Pt stated \"I had a good time but I'm glad to be home\".   6.  Side Effects: pt denied   7.  Education: Instructed pt to stop taking his oral Invega starting today and explained the process of titrating back on the injection. We discussed the importance of him returning in one week for his next injection.     8. Next appointment:  at 1330  9. Location: Left Deltoid     The service provided today was rendered under the supervising provider of the day, Dr. Schneider, who was available for consultation as needed.      "

## 2020-02-13 ENCOUNTER — TELEPHONE (OUTPATIENT)
Dept: PSYCHIATRY | Facility: CLINIC | Age: 25
End: 2020-02-13

## 2020-02-13 NOTE — TELEPHONE ENCOUNTER
Writer left a DVM at patient's listed number regarding missed 2nd loading dose of Invega Sustenna 156mg. Writer left direct phone number as call back.Sariah Rivers LPN

## 2020-02-17 ENCOUNTER — ALLIED HEALTH/NURSE VISIT (OUTPATIENT)
Dept: PSYCHIATRY | Facility: CLINIC | Age: 25
End: 2020-02-17
Payer: COMMERCIAL

## 2020-02-17 DIAGNOSIS — F20.9 SCHIZOPHRENIA, UNSPECIFIED TYPE (H): Primary | ICD-10-CM

## 2020-02-17 PROCEDURE — 96372 THER/PROPH/DIAG INJ SC/IM: CPT | Mod: ZF

## 2020-02-17 PROCEDURE — 25000128 H RX IP 250 OP 636: Mod: ZF | Performed by: PSYCHIATRY & NEUROLOGY

## 2020-02-17 RX ADMIN — PALIPERIDONE PALMITATE 156 MG: 156 INJECTION INTRAMUSCULAR at 14:06

## 2020-02-17 NOTE — NURSING NOTE
Clinic Administered Medication Documentation      Injectable Medication Documentation    Patient was given Invega Sustenna 156 MG. Prior to medication administration, verified patients identity using patient s name and date of birth. Please see MAR and medication order for additional information. Patient instructed to remain in clinic for 15 minutes and report any adverse reaction to staff immediately .      Was entire vial of medication used? Yes  Vial/Syringe: Syringe  Expiration Date:   Was this medication supplied by the patient? No      Jasen Guillen,  [birth year], presented to the clinic today at the request of Dr. Rios,  ordering provider for long-acting injectable Invega Sustenna 156 MG.    OBSERVATIONS:  1.  Appearance: good personal hygiene and dressed appropriate for the weather. Pt. Has on jeans, t-shirt and sweatshirt.   2.  Mood: good   3.  Affect: Congruent   4.  Attitude: good,  5.  Cooperation: pt was pleasant and easily engaged in conversation, we discussed his up-comming move to [place]. Pt has some concerns about how he will get his injection once he moves. He is scheduled to see Dr. Rios on 3/20/20 and will talk with her about a plan.    6.  Side Effects: pt denies  7.  Education: none needed  8. Next appointment: 3/20/20 at 1330  9. Location:Left Deltoid     He missed his injection appointment on 20, the injection he received today is the pts second loading dose of Invega Sustenna 156 MG.     The service provided today was rendered under the supervising provider of the day, Dr. Purcell, who was available for consultation as needed.

## 2020-03-10 ENCOUNTER — HEALTH MAINTENANCE LETTER (OUTPATIENT)
Age: 25
End: 2020-03-10

## 2020-04-03 ENCOUNTER — TELEPHONE (OUTPATIENT)
Dept: PSYCHIATRY | Facility: CLINIC | Age: 25
End: 2020-04-03

## 2020-04-03 DIAGNOSIS — F20.9 SCHIZOPHRENIA, UNSPECIFIED TYPE (H): ICD-10-CM

## 2020-04-03 RX ORDER — PALIPERIDONE 9 MG/1
9 TABLET, EXTENDED RELEASE ORAL DAILY
Qty: 30 TABLET | Refills: 1 | Status: SHIPPED | OUTPATIENT
Start: 2020-04-03 | End: 2020-04-21

## 2020-04-03 NOTE — TELEPHONE ENCOUNTER
Arethar received VORB from the provider to order paliperidone 9 mg a day to [facility], as pt missed his injection and wants to avoid coming into clinic due to COVID-19.    Arethar sent 30 d/s with one refill to [facility].

## 2020-04-03 NOTE — TELEPHONE ENCOUNTER
Provider asked that writer reach out to pt and remind him that oral medication was e-prescribed to [place] [facility] and complete quick safety and overall assessment. Called pt. No answer. LVM informing him that Rx was sent to the pharmacy and requested a c/b if he'd like it sent elsewhere or has any other concerns.

## 2020-04-17 ENCOUNTER — VIRTUAL VISIT (OUTPATIENT)
Dept: PSYCHIATRY | Facility: CLINIC | Age: 25
End: 2020-04-17
Attending: PSYCHIATRY & NEUROLOGY
Payer: COMMERCIAL

## 2020-04-17 ENCOUNTER — TELEPHONE (OUTPATIENT)
Dept: PSYCHIATRY | Facility: CLINIC | Age: 25
End: 2020-04-17

## 2020-04-17 DIAGNOSIS — F20.9 SCHIZOPHRENIA, UNSPECIFIED TYPE (H): Primary | ICD-10-CM

## 2020-04-17 NOTE — TELEPHONE ENCOUNTER
On 4/17/2020, at 12:10, writer called patient at [phone] to confirm Virtual Visit. Writer unable to make contact with patient. Writer left detailed voice message for callback. [phone], left as call back number. Nayeli Mornoy, [facility]

## 2020-04-21 ENCOUNTER — TELEPHONE (OUTPATIENT)
Dept: PSYCHIATRY | Facility: CLINIC | Age: 25
End: 2020-04-21

## 2020-04-21 DIAGNOSIS — F20.9 SCHIZOPHRENIA, UNSPECIFIED TYPE (H): ICD-10-CM

## 2020-04-21 RX ORDER — PALIPERIDONE 9 MG/1
9 TABLET, EXTENDED RELEASE ORAL AT BEDTIME
Qty: 30 TABLET | Refills: 1 | Status: SHIPPED | OUTPATIENT
Start: 2020-04-21 | End: 2020-06-01

## 2020-04-21 RX ORDER — PALIPERIDONE 3 MG/1
3 TABLET, EXTENDED RELEASE ORAL EVERY MORNING
Qty: 30 TABLET | Refills: 1 | Status: SHIPPED | OUTPATIENT
Start: 2020-04-21 | End: 2020-06-01 | Stop reason: DRUGHIGH

## 2020-04-21 RX ORDER — LORAZEPAM 0.5 MG/1
0.5 TABLET ORAL
Qty: 20 TABLET | Refills: 1
Start: 2020-04-21

## 2020-04-21 NOTE — TELEPHONE ENCOUNTER
Writer received the following VORB from the provider:    1. Increase pt's oral Invega to 3 mg QAM + 9 mg at bedtime    2. Ativan 0.5 mg PO at bedtime PRN restlessness/insomnia, #20 with 1 refill    Writer sent Invega prescriptions to [facility] pharmacy. Ativan phoned to pharmacy  at [phone]. Med tab changed to reflect this.

## 2020-04-21 NOTE — TELEPHONE ENCOUNTER
[facility], [facility]  Jinny Giles, RN               Carlitos Stearns,     I got a 90 d/s request for the following                                                                       Disp Refills    Start      End         CONI   LORazepam (ATIVAN)            0.5 MG tablet      20 tablet       1 4/21/2020  --   Sig - Route: Take 1 tablet (0.5 mg) by mouth nightly as needed for sleep (restlessness) - Oral   Class: No Print Out   Notes to Pharmacy: Phoned to pharmacy VM   Order: [id number]     I see it was just refilled for 20 tab w/ 1 r/f today, the pharmacy is requesting a 90 d/s.       Thanks   Nayeli        Confirmed with the provider that she does NOT want to provide a 90 d/s. Called the pharmacy and gave VORB to Renetta landon to keep Rx as a 20 d/s with 1 refill.

## 2020-04-26 ENCOUNTER — HOSPITAL ENCOUNTER (EMERGENCY)
Facility: CLINIC | Age: 25
Discharge: HOME | End: 2020-04-26
Attending: EMERGENCY MEDICINE | Admitting: EMERGENCY MEDICINE
Payer: COMMERCIAL

## 2020-04-26 VITALS
TEMPERATURE: 98.2 F | WEIGHT: 210 LBS | DIASTOLIC BLOOD PRESSURE: 90 MMHG | RESPIRATION RATE: 18 BRPM | HEART RATE: 80 BPM | SYSTOLIC BLOOD PRESSURE: 140 MMHG | HEIGHT: 73 IN | BODY MASS INDEX: 27.83 KG/M2 | OXYGEN SATURATION: 100 %

## 2020-04-26 DIAGNOSIS — F41.9 ANXIETY: ICD-10-CM

## 2020-04-26 PROCEDURE — 99282 EMERGENCY DEPT VISIT SF MDM: CPT | Mod: Z6 | Performed by: EMERGENCY MEDICINE

## 2020-04-26 PROCEDURE — 99282 EMERGENCY DEPT VISIT SF MDM: CPT | Performed by: EMERGENCY MEDICINE

## 2020-04-26 ASSESSMENT — ENCOUNTER SYMPTOMS
CHILLS: 0
FEVER: 0
COUGH: 0
SHORTNESS OF BREATH: 0

## 2020-04-26 ASSESSMENT — MIFFLIN-ST. JEOR: SCORE: 1991.43

## 2020-04-26 NOTE — ED AVS SNAPSHOT
[facility], [place], Emergency Department  [address] [address]  Phone:  [phone]                                    Jasen Guillen III   MRN: [id number]    Department:  [facility], Emergency Department   Date of Visit:  4/26/2020           After Visit Summary Signature Page    I have received my discharge instructions, and my questions have been answered. I have discussed any challenges I see with this plan with the nurse or doctor.    ..........................................................................................................................................  Patient/Patient Representative Signature      ..........................................................................................................................................  Patient Representative Print Name and Relationship to Patient    ..................................................               ................................................  Date                                   Time    ..........................................................................................................................................  Reviewed by Signature/Title    ...................................................              ..............................................  Date                                               Time          22EPIC Rev 08/18

## 2020-04-27 ENCOUNTER — TELEPHONE (OUTPATIENT)
Dept: PSYCHIATRY | Facility: CLINIC | Age: 25
End: 2020-04-27

## 2020-04-27 NOTE — TELEPHONE ENCOUNTER
Writer received message from provider regarding refills for patient's Invega 3 mg and 9 mg. She said pt is requesting a refill of 9 mg tabs. Agreed to call [facility] to gather more information.    Called [facility] pharmacy and was informed that Invega 3 mg tabs and Ativan are ready for pickup. The Invega 9 mg is too soon to fill, as this was last filled on 4/3 for a 30 d/s.     Called pt and LVM with the above information. Requested a c/b if he has questions or runs into issues at the pharmacy.

## 2020-04-27 NOTE — ED PROVIDER NOTES
ED Provider Note  [facility]      History     Chief Complaint   Patient presents with     Dizziness     Anxiety     HPI  Jasen Guillen III is a 24 year old male with a past medical history significant for schizophrenia presents to the ED today for evaluation of dizziness and anxiety.    States he was sobbing uncontrollably at home, his friend saw him, were concerned so they brought him to the emergency department.  Patient states he is no longer sobbing, was feeling guilty but not really sure why.  Denies homicidal suicidal ideation.  Compliant with his meds, taking oral version of his injection, missed a few weeks because he was afraid to go into the clinic to get his injection due to fear of catching coronavirus.    Past Medical History  Past Medical History:   Diagnosis Date     Anxiety      Schizophrenia (H)      Past Surgical History:   Procedure Laterality Date     ENT SURGERY      wisdome teeth     LORazepam (ATIVAN) 0.5 MG tablet  paliperidone (INVEGA SUSTENNA) 156 MG/ML SUSP injection  paliperidone (INVEGA SUSTENNA) 156 MG/ML RADHA injection  paliperidone ER (INVEGA) 3 MG 24 hr tablet  paliperidone ER (INVEGA) 9 MG 24 hr tablet      No Known Allergies  Past medical history, past surgical history, medications, and allergies were reviewed with the patient. Additional pertinent items: None    Family History  No family history on file.  Family history was reviewed with the patient. Additional pertinent items: None    Social History  Social History     Tobacco Use     Smoking status: Former Smoker     Smokeless tobacco: Never Used   Substance Use Topics     Alcohol use: No     Alcohol/week: 0.0 standard drinks     Drug use: No      Social history was reviewed with the patient. Additional pertinent items: None    Review of Systems   Constitutional: Negative for chills and fever.   Respiratory: Negative for cough and shortness of breath.    All other systems reviewed and are  "negative.    A complete review of systems was performed with pertinent positives and negatives noted in the HPI, and all other systems negative.    Physical Exam   BP: 126/87  Pulse: 78  Temp: 98.2  F (36.8  C)  Resp: 18  Height: 185.4 cm (6' 1\")  Weight: 95.3 kg (210 lb)  SpO2: 97 %  Physical Exam  Constitutional:       General: He is not in acute distress.     Appearance: Normal appearance.   HENT:      Head: Normocephalic and atraumatic.      Right Ear: External ear normal.      Left Ear: External ear normal.      Nose: Nose normal. No rhinorrhea.   Eyes:      Conjunctiva/sclera: Conjunctivae normal.   Cardiovascular:      Rate and Rhythm: Normal rate and regular rhythm.      Pulses: Normal pulses.   Pulmonary:      Effort: Pulmonary effort is normal. No respiratory distress.      Breath sounds: Normal breath sounds.   Abdominal:      General: There is no distension.      Palpations: Abdomen is soft.      Tenderness: There is no abdominal tenderness.   Musculoskeletal:         General: No deformity or signs of injury.   Skin:     General: Skin is warm and dry.      Capillary Refill: Capillary refill takes less than 2 seconds.   Neurological:      General: No focal deficit present.      Mental Status: He is alert. Mental status is at baseline.   Psychiatric:         Mood and Affect: Mood normal.         Behavior: Behavior normal.         ED Course      Procedures                         No results found for any visits on 04/26/20.  Medications - No data to display     Assessments & Plan (with Medical Decision Making)     24-year-old male brought in by friends for crying, no longer in any acute distress, labs would not be of benefit in this patient, no concern for serotonin syndrome given normal vitals and not hyperreflexive.  Will discharge home, recommend exercise and following up with psychiatrist if his anxiety worsens.    I have reviewed the nursing notes. I have reviewed the findings, diagnosis, plan and need " for follow up with the patient.    New Prescriptions    No medications on file       Final diagnoses:   Anxiety       --     Emergency Medicine   [facility], [place], EMERGENCY DEPARTMENT  4/26/2020     Nate Benitez MD  04/26/20 2050

## 2020-04-27 NOTE — ED TRIAGE NOTES
Patient presents ambulatory to triage c/o dizziness and feeling overwhelmed starting about 45 minutes ago. Patient denies suicidal ideation or plan, however, states he had a suicide attempt about 5 years ago. Hx schizophrenia, recently got back on his meds 2 weeks ago after being off for a couple weeks.

## 2020-05-01 NOTE — PROGRESS NOTES
"VIDEO VISIT  Jasen Guillen III is a 24 year old patient who is being evaluated via a billable video visit.      At the patient's request, one of his housemates joined part of the visit to share his observations.    The patient has been notified of following:   \"We have found that certain health care needs can be provided without the need for an in-person physical exam. This service lets us provide the care you need with a video conversation. If a prescription is necessary we can send it directly to your pharmacy. If lab work is needed we can place an order for that and you can then stop by our lab to have the test done at a later time. Insurers are generally covering virtual visits as they would in-office visits so billing should not be different than normal.  If for some reason you do get billed incorrectly, you should contact the billing office to correct it and that number is in the AVS .    Patient has given verbal consent for video visit?: Yes  How would you like to obtain your AVS?: Not needed.  AVS SmartPhrase has been placed in 'Patient Instructions':  NA    Video- Visit Details  Type of service:  video visit for medical evaluation and management.   Time of service:    Date: April 17    Video Start Time: 12:30        Video End Time:  1:30    Reason for video visit:  Psychiatry outpatient follow-up visit.  Originating Site (patient location):  Patient's home  Distant Site (provider location):  Provider home office.  Mode of Communication:  Video Conference via [email or web address]        PSYCHIATRY CLINIC PROGRESS NOTE     CC:  \"I am graduating this month.\"    INTERIM HISTORY                                                   Jasen Guillen III is a 24 year old male with a diagnosis of schizophrenia (for past ~ 5 years) who comes in for a scheduled appointment today. We met for medication eval and management, as well as 20 minutes of supportive psychotherapy.    Patient had been receiving monthly  paliperidone " "injections, 156 mg, a decrease from his original dose of 254 mg (decreased over a year ago).  Overall, he had done very well on this lower dose.  No psychotic sx, no ocular movements, no motor side effects.     However, over past 3 weeks, due to covid-19 crisis, pt  had not wanted to come in to clinic for his injection (us afraid of jimmy the virus), and switched himself to some oral meds (9 mg) that he has at home.    Reports that while he feels well on the oral meds-- \"I can think more clearly and get more done\"-- his housemates have been a bit concerned about him and wanted him to meet with his psychiatrist. They \"want the old Bill back.\"  Also have some concerns about his irregular sleep.      PSYCH ROS:    Positive Psychotic symptoms:      Denies ideas of reference. Does not report any feelings of paranoia or being in danger.  Denies hallucinations or disorganized thinking.  However, does feel like he is better able to understand the \"connection among all things.\" Is having irregular sleep due to many interests in aspects of medieval history, early engineering projects, philosophical writings, etc.    Negative psychotic symptoms:    No reports of anhedonia and amotivation.  Housemates feel he has been more internally preoccupied and more focused on studying a series of arcane interests that keep him socially isolated.    Mood symptoms:     Sx of depression and anxiety remain resolved.   No feelings of hopelessness or of self-harm. No SI.    Cognition:    Attention and concentration are reported to be good.     Substance use: Not present.    Obsessive-compulsive symptoms: No further description of unwanted graphic sexual imagery that is upsetting. No compulsive behavior.    Motor symptoms:  No further tic-like behavior, as described in prior notes.    MEDICAL ROS:       Constitutional: No fever, fatigue.    Neurologic: Denies problems with salivation, muscle stiffness, motor restlessness. Eye spasms have " resolved.  +  motor restlessness. No dizziness.  HEENT: Mild dry mouth. No dry eyes.  Cardiovascular: No chest pain or palpitations.  Respiratory: No shortness of breath, dyspnea on exertion, or wheezing.  Gastrointestinal: No nausea or constipation  : No symptoms reported  Musculoskeletal:No musculoskeletal difficulties.  Skin: WNL.  Endocrine: No abnormalities.    PSYCHOSOCIAL HX:    Continues to maintain his close relationships with housemates and friends in [employer] group-- is able to share his personal hx with them and get support.    Plans to enter the novitiate at a campus near [place] when able to travel again.       PAST PSYCH HISTORY:    Received intensive treatment 5 years ago for an enduring episode of psychosis that was characterized predominantly by ideas of reference (feeling that he was being talked about  over the Internet) he also experienced disorganized thinking and disorganized behavior.    Of note, this patient's past andrecent presentation has been characterized by the presence of mild chronic dyskinetic movements, now much improved.    ALLERGY                                Review of patient's allergies indicates no known allergies.  MEDICATIONS                               Previously receiving Paliperidone 156 m IM q 28 days    Now taking paliperidone 9 mg at bedtime.           VITALS       There were no vitals taken for this visit.    MENTAL STATUS EXAM                                                             Orientation:  x4  Gait and Station:  Not observed.  Motor:  Shows sllightly restless upper body movements.  Alertness: Alert, oriented.  Appearance: Neatly and casually dressed.  Behavior/Demeanor: Cooperative, pleasant, with good eye contact.  Appropriately interactive.  Speech: Slightly pressured, normal volume.  Language: Language is vague, unable to describe specifics of how he is spending his time or of what his housemates are concerned about.  Psychomotor: Normal PM speed.  "  Mood: \"really good.\"  Affect: Slightly restricted.  Thought Process/Associations: Somewhat disorganized.  Thought Content: Denies specific delusional thoughts, ideas of reference, SI. HI.  Says he sees how \"things are connected\" but it is \"more rational and spread out\" than 5 years ago.  Perception:  Denies hallucinations. No illusions.  Insight: Good--recognizes that housemates' are concerned..  Judgment: Recognizsz need for treatment.  Cognition: Generally  Intact  Recent and Remote Memory: Intact  Attention Span and Concentration:  Intact during exam  Fund of Knowledge:excellent        DIAGNOSIS/ ASSESSMENT     Schizophrenia, slight exacerbation in disorganized symptoms and in hyper-salience experiences, due to not getting injection on schedule and switching to oral dose.  Major depression, in remission.  Mild dyskinesias and possible akathisia in past. Difficult to disentangle what might be medication induced from what might be primary pathology. There is a strong motor system presentation in this pt. Sx not a problem today.        PLAN                                                                                                       1) Positive/ disorganized Psychotic Sx:   Pt still unwilling to come in for injection to clinic.  Will thus increase oral dose to 3 mg po qam and 9 mg po at bedtime.             2) Negative psychotic symptoms:      Resolved.    3) Mood and anxiety symptoms:   Resolved.     4) Disturbed Sleep  Pt may use lorazepam 0.5 mg at bedtime prn agitation or insomnia, for a few nights.. Have recommended a focus on regular sleep and exericse.    5)  RTC in 4 weeks.        TREATMENT RISK STATEMENT:  The risks, benefits, alternatives and potential adverse effects have been discussed and are understood by the patient/ patient's guardian. The pt understands the risks of using street drugs or alcohol.  There are no medical contraindications, the pt agrees to treatment with the ability to do so. " " The patient understands to call 911 or come to the nearest ED if life threatening or urgent symptoms present.    PSYCHIATRY CLINIC INDIVIDUAL PSYCHOTHERAPY NOTE   Length of session: 20 minutes, 12:40 to 1 pm     Subjective: \"I guess I need to listen to what my housemates are telling me.\"   Plan:  Continue supportive psychotherapy during monthly medication management visits.         Psychotherapy services during this visit included  myself and Jasen Guillen and his housemate.  Issues addressed in therapy included: getting on s atable dose of oral meds     TREATMENT  PLAN          SYMPTOMS;PROBLEMS   MEASURABLE GOALS;    FUNCTIONAL IMPROVEMENT INTERVENTIONS;    GAINS MADE DISCHARGE CRITERIA   Low energy, lack of motivation Patient will experience a significant improvement in energy and motivation, and will be able to engage in outsidesocializing activities as well as classwork Supportive Psychotherapy once per month    Patient enjoying active social life Resolved   Ideas of reference during times of stress Patient will no longer experience ideas of reference Monthly injectable paliperidone  CBT techniques  IOR improved after recent flare-up Two years without symptoms, followed by two years of monitoring without symptoms relapse   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      No tics               "

## 2020-05-11 ENCOUNTER — TELEPHONE (OUTPATIENT)
Dept: PSYCHIATRY | Facility: CLINIC | Age: 25
End: 2020-05-11

## 2020-05-11 DIAGNOSIS — F20.9 SCHIZOPHRENIA, UNSPECIFIED TYPE (H): Primary | ICD-10-CM

## 2020-05-11 NOTE — TELEPHONE ENCOUNTER
"[facility] Call Center    Phone Message    May a detailed message be left on voicemail: yes     Reason for Call: Other:   Patient's father, Gato, called with concerns about the patient. Patient is currently living with his grandmother and sister who have noticed a decline in his functioning. Patient's father is wondering if his meds might need to be adjusted.    Patient seemed to regress back to where he was about 3 years ago (unable to focus, belligerent, incoherent, no motivation, almost unable to complete sentences). He is also slightly threatening towards his sister. On a scale from 0-10 (0 is not functioning at all, 10 is functioning well), patient's sister described him as a \"4\" a few days ago, and now closer to a \"7\".    Patient's father is looking for involvement with social work as well as Dr. Rios.        Action Taken: Message routed to:  Other: Nursing pool    Travel Screening: Not Applicable                                                                      "

## 2020-05-12 ENCOUNTER — ALLIED HEALTH/NURSE VISIT (OUTPATIENT)
Dept: PSYCHIATRY | Facility: CLINIC | Age: 25
End: 2020-05-12
Payer: COMMERCIAL

## 2020-05-12 VITALS
SYSTOLIC BLOOD PRESSURE: 124 MMHG | BODY MASS INDEX: 30.32 KG/M2 | WEIGHT: 229.8 LBS | HEART RATE: 74 BPM | DIASTOLIC BLOOD PRESSURE: 76 MMHG

## 2020-05-12 DIAGNOSIS — F20.9 SCHIZOPHRENIA, UNSPECIFIED TYPE (H): Primary | ICD-10-CM

## 2020-05-12 PROCEDURE — 25000128 H RX IP 250 OP 636: Mod: ZF | Performed by: PSYCHIATRY & NEUROLOGY

## 2020-05-12 PROCEDURE — 96372 THER/PROPH/DIAG INJ SC/IM: CPT | Mod: ZF

## 2020-05-12 RX ADMIN — PALIPERIDONE PALMITATE 234 MG: 234 INJECTION INTRAMUSCULAR at 12:30

## 2020-05-12 ASSESSMENT — PAIN SCALES - GENERAL: PAINLEVEL: NO PAIN (0)

## 2020-05-12 NOTE — NURSING NOTE
"Clinic Administered Medication Documentation      Injectable Medication Documentation    Patient was given Invega Sustenna 234 MG. Prior to medication administration, verified patients identity using patient s name and date of birth. Please see MAR and medication order for additional information. Due to the pandemic and the need for social distancing, the pt was allowed to leave the clinic after his injection. Pt was instructed to contact the clinic at [phone] if he experienced any adverse reaction. .      Was entire vial of medication used? Yes  Vial/Syringe: Syringe  Expiration Date: 10/2021  Was this medication supplied by the patient? No      Jasen Guillen,  1995, presented to the clinic today at the request of Dr. Rios, ordering provider for long-acting injectable Invega Sustenna 345 MG.    OBSERVATIONS:  1.  Appearance: good personal hygiene, clothing was clean and appropriate for the weather. He was wearing a mask and had a pull up bandana around his neck. He had his right ear bud in throughout out visit. Today's weight was 229.80 lbs, up from 210 lb on 2020. His B/P was 124/76 and pulse was 74. Pt denies SI or HI - pt stated \"I wonder about hurting my emotional state, like sliding off. I don't really have thoughts of hurting myself physically.\" He stated that he feels more exposed but couldn't describe what that meant of felt like. He said that he cries a lot and was taken to the hospital by friends a couple of week ago. (refer to ED note on 20) He reports that he \"social reads\" when writer asked what he meant by that, pt stated \"it's how a person represents to a specific situation.\" He reports \"sounding out words\"  And\" wearing a lot of [place] gear\". Pt reports that he has been sleeping ok and gets out for walks.   2.  Mood: tense, restless, poor eye contact  3.  Affect: somewhat guarded with poor eye contact  4.  Attitude: cooperative   5.  Cooperation: full  6.  Side " Effects: pt denied   7.  Education: writer reminded pt that it's really important to keep his appointment on 5/19/20 for his next injection. Pt didn't want to schedule an appointment because he wasn't sure when his sister could bring him. I suggested that we make an appointment and if he needed to change the time, he could call the clinic. Although reluctant, he did schedule an appointment.Writer also reminded pt that he has a video visit with Dr. Villafuerte on 5/14/20 at 1245.   8. Next appointment: 5/19/20 at 1200  9. Location:left deltoid     The service provided today was rendered under the supervising provider of the day, Dr. Granado, who was available for consultation as needed.

## 2020-05-12 NOTE — TELEPHONE ENCOUNTER
Writer called pt's father to gather more information. He confirmed the information provided in the original message. Essentially, pt seems to be regressing back to where he was approximately 3-4 years ago. Per his sister, he's at a 4 on a scale of 0 - 10 (0 being where he was in the past). His sister said the pt is antagonistic, belligerent, un-motivated, incoherent, and gaining weight. Dad believes the pt is taking his medications. When reviewing his chart, oral Invega was filled on 4/21, so he is likely taking these.    Dad also relayed that residential treatment and school seemed to help the pt in the past with staying motivated. He wonders if this would be helpful.     Agreed to discuss all of the above with the provider and will keep dad updated with recommendations. At this time, he does not have any safety concerns for the pt.

## 2020-05-12 NOTE — TELEPHONE ENCOUNTER
Connected with provider who would like to see the pt by video this Thursday, 5/14. She would also like pt to come into clinic ASAP to receive Invega Sustenna IM. Received VORB for 234 mg IM this week, and then 156 mg IM next week and every 4 weeks after that. Writer entered both orders and will notify team of injection. Staff message sent to [facility] to confirm coverage.    Called pt and discussed the provider's recommendations. Pt voiced understanding. He confirmed that he's taking oral medication as well, but will still come in for an injection as recommended. He will need to coordinate this with his grandparents and sister first. He will then c/b to schedule the injection. In the meantime, he scheduled a video visit with the provider for this Thursday at 12:45 pm. Message sent to scheduling. Will send video instructions via [employer] per pt's request.

## 2020-05-12 NOTE — TELEPHONE ENCOUNTER
Writer called pt's father, Gato. He answered, but it was very difficult to hear each other. Dad will call the clinic back to speak with this writer.

## 2020-05-12 NOTE — TELEPHONE ENCOUNTER
Karolina Jessica Laura, CL Stearns,     This patient is good to go.     Thanks,     Karolina        Also confirmed with provider that pt can stop oral Invega next week if he receives injection this week.

## 2020-05-13 ENCOUNTER — TELEPHONE (OUTPATIENT)
Dept: PSYCHIATRY | Facility: CLINIC | Age: 25
End: 2020-05-13

## 2020-05-13 NOTE — TELEPHONE ENCOUNTER
First Episode Psychosis Program    Incoming/Outgoing Call  [facility]    Outgoing call to: Pt's father, Gato Lobo [phone]    Reason for Call:  Wanting to discuss resource options in [place] for residential treatment, and other programs that would benefit his son.      Response/Plan:  Answered.    I discussed various programs in the [facility] from least intensive to most intensive.  According to his father, it seems like restarting the injection and adding structure like a day treatment program may be what Gato paul needs right now.  Though they may consider a residential program in the future.  Gato ArroyoMartin Plans to talk to Gato Paul about the resource options for Gato Villalpando To make an informed choice about what he feels like he needs at this time.  He also plans to discuss this with Dr. Rios in tomorrow's visit.  I provided Gato Lobo An email summary of the resources we discussed with useful links for the family to view together.      I will contact Gato Paul on Friday to discuss his questions and ideas for next steps and assist in executing referrals as necessary.     Will route to patient's current psychiatric provider(s) as an FYI.   Please call or EPIC message with any questions or concerns.    HELIO Lombardi, [facility]  [phone]

## 2020-05-13 NOTE — TELEPHONE ENCOUNTER
Writer received incoming phone call from pt's father. He wanted to confirm that yesterday's message was received. Writer did confirm this.    He also elaborated on this message and said he also believes the pt needs some form of treatment or a change in his current living situation. Dad said yesterday the pt's sister messaged him saying she wants to move out of the home due to pt's current state. She told dad that the pt has been yelling at her and blowing up. He typically apologizes an hour later, but she feels he's just too unpredictable, which makes her uncomfortable with living at home.    Dad asked about different programs that pt could attend or participate in. He feels the pt did better while in school and had something to preoccupy his time. Currently, he feels the pt is unmotivated, as he's been without any outlet since May. He is looking for recommendations from the team.    Writer agreed to relay the above information and request to the provider and SW. Informed him that final recommendations will likely come after tomorrow's scheduled video visit. He voiced understanding.

## 2020-05-13 NOTE — TELEPHONE ENCOUNTER
"Jackie Bear Laura, RN               Dad, Gato, called, [phone], said no follow up was necessary, but that he wanted you to know:     Dad talked to Gato, he called dad about 11 am this morning and said \"with all this talk about my mental health I was just thinking that I don't think I would mind going back to treatment. I might actually like it. But I don't want to make that decision myself at all. And I just want you to know that.\" \"...I want other people to make the decision for me, I don't know what's best for me and want to trust someone else\"        Routed to team as YVONNE  "

## 2020-05-14 ENCOUNTER — VIRTUAL VISIT (OUTPATIENT)
Dept: PSYCHIATRY | Facility: CLINIC | Age: 25
End: 2020-05-14
Attending: PSYCHIATRY & NEUROLOGY
Payer: COMMERCIAL

## 2020-05-14 DIAGNOSIS — F20.9 SCHIZOPHRENIA, UNSPECIFIED TYPE (H): Primary | ICD-10-CM

## 2020-05-14 ASSESSMENT — PAIN SCALES - GENERAL: PAINLEVEL: NO PAIN (0)

## 2020-05-14 NOTE — PATIENT INSTRUCTIONS
Thank you for coming to the PSYCHIATRY CLINIC.    Lab Testing:  If you had lab testing today and your results are reassuring or normal they will be mailed to you or sent through [facility] within 7 days.   If the lab tests need quick action we will call you with the results.  The phone number we will call with results is # [phone] (home) . If this is not the best number please call our clinic and change the number.    Medication Refills:  If you need any refills please call your pharmacy and they will contact us. Our fax number for refills is [phone]. Please allow three business for refill processing.   If you need to  your refill at a new pharmacy, please contact the new pharmacy directly. The new pharmacy will help you get your medications transferred.     Scheduling:  If you have any concerns about today's visit or wish to schedule another appointment please call our office during normal business hours [phone] (8-5:00 M-F)    Contact Us:  Please call [phone] during business hours (8-5:00 M-F).  If after clinic hours, or on the weekend, please call [phone].    Financial Assistance: [phone]  [phone]  Central Billing Office, [facility]: [phone]  [place] [phone]  Medical Records: [phone]    MENTAL HEALTH CRISIS NUMBERS:  [facility]:   [facility]: [phone]  Crisis Residence [facility]: [phone]   Walk[facility]In [facility]: [phone]   [facility]E  [place] Mobile Team: [phone] (adults) [phone] (child)     [facility]:   [facility]: [phone]   Walk-in counseling [facility]: [phone]   Walk-in counseling [facility]: [phone]   Crisis Residence [facility]: [phone]   Urgent Care Adult Mental Health: [phone] Mobile team AND  crisis line    Other Crisis Numbers:   National Suicide  Prevention Lifeline: 004-110-ELAT (867-026-7905)  CRISIS TEXT LINE: Text to 941477 for any crisis 24/7; OR www.crisistextline.org   Poison Control Center: 4-560-175-2377  CHILD: [facility] needs assessment team: [phone]   Trans Lifeline: 1-132.946.5022  [place] [facility] Lifeline: 1-621.781.1366    For a medical emergency please call 911 or go to the nearest ER.         _____________________________________________    Again thank you for choosing [facility]RY CLINIC and please let us know how we can best partner with you to improve you and your family's health.    You may be receiving a survey regarding this appointment. We would love to have your feedback, both positive and negative. The survey is done by an external company, so your answers are anonymous.

## 2020-05-14 NOTE — PROGRESS NOTES
"VIDEO VISIT  Jasen Guillen III is a 24 year old patient who is being evaluated via a billable video visit.      The patient has been notified of following:   \"This video visit will be conducted via a call between you and your physician/provider. We have found that certain health care needs can be provided without the need for an in-person physical exam. This service lets us provide the care you need with a video conversation. If a prescription is necessary we can send it directly to your pharmacy. If lab work is needed we can place an order for that and you can then stop by our lab to have the test done at a later time. Insurers are generally covering virtual visits as they would in-office visits so billing should not be different than normal.  If for some reason you do get billed incorrectly, you should contact the billing office to correct it and that number is in the AVS .    Patient has given verbal consent for video visit?:  Yes     How would you like to obtain your AVS?:  Exec[employer]    Video invitation for patient:  DOXY provider, invite not needed      AVS SmartPhrase [PsychAVS] has been placed in 'Patient Instructions':  Yes     "

## 2020-05-15 ENCOUNTER — OFFICE VISIT (OUTPATIENT)
Dept: PSYCHIATRY | Facility: CLINIC | Age: 25
End: 2020-05-15
Attending: PSYCHOLOGIST
Payer: COMMERCIAL

## 2020-05-15 DIAGNOSIS — F20.9 SCHIZOPHRENIA, UNSPECIFIED TYPE (H): Primary | ICD-10-CM

## 2020-05-18 ENCOUNTER — TELEPHONE (OUTPATIENT)
Dept: PSYCHIATRY | Facility: CLINIC | Age: 25
End: 2020-05-18

## 2020-05-18 NOTE — PROGRESS NOTES
First Episode Group       Client: Jasen Guillen III  : 1995  MRN: [id number]  Date of Service: 5/15/2020  Diagnosis: Jasen Guillen III is being seen for a diagnosis of unspecified psychosis  Number of Participants: 5  Group Time: 60 minutes  Facilitators: Sera Landrum PsyD LP, HELIO Lombardi, [facility] HospitalSW and Howard Mcmahon, PhD     Video- Visit Details  Type of service:  video visit for group therapy  Time of service:    Date:  2020    Video Start Time:  3:00pm        Video End Time:  4:00pm    Reason for video visit:  Patient unable to travel due to Covid-19  Originating Site (patient location):  Patient's home  Distant Site (provider location):  Remote location  Mode of Communication:  Video Conference via [employer]  Consent:  Patient has given verbal consent for video visit?: Yes     The first episode group is based on the cognitive behavioral therapy model that uses psychoeducation, cognitive restructuring, problem solving techniques, and social skills.     Diagnostic criteria for group participation: History of psychosis    Group Topic for Today: Social skills and netiquette in a online environment    Description: Attentive   , Active Participant, Cooperative  Gato was very engaged in the group milieu. He spoke without prompting and provided a lot of content to the group topic.    Plan: Continue with group goals to minimize impact of psychotic symptoms and maintain stability

## 2020-05-18 NOTE — TELEPHONE ENCOUNTER
Provider met with pt on 5/14. Confirmed with provider today, plan is still to continue with Invega Sustenna 156 mg this week and then every 4 weeks going forward. Writer removed Invega Sustenna 234 mg from pt's chart and notified LPN who will administer injection.

## 2020-05-18 NOTE — TELEPHONE ENCOUNTER
Writer left  at phone number [phone], reminding him that he is scheduled tomorrow at 1200 for his HARRELL. Writer also encouraged him to come to the clinic by himself as we are allowing only patients in the clinic area due to Covid 19. Adina Viera LPN

## 2020-05-19 ENCOUNTER — ALLIED HEALTH/NURSE VISIT (OUTPATIENT)
Dept: PSYCHIATRY | Facility: CLINIC | Age: 25
End: 2020-05-19
Payer: COMMERCIAL

## 2020-05-19 DIAGNOSIS — F20.9 SCHIZOPHRENIA, UNSPECIFIED TYPE (H): Primary | ICD-10-CM

## 2020-05-19 PROCEDURE — 25000128 H RX IP 250 OP 636: Mod: ZF | Performed by: PSYCHIATRY & NEUROLOGY

## 2020-05-19 PROCEDURE — 96372 THER/PROPH/DIAG INJ SC/IM: CPT | Mod: ZF

## 2020-05-19 RX ADMIN — PALIPERIDONE PALMITATE 156 MG: 156 INJECTION INTRAMUSCULAR at 12:00

## 2020-05-19 NOTE — NURSING NOTE
"Clinic Administered Medication Documentation      Injectable Medication Documentation    Patient was given Invega Sustenna 156mg . Prior to medication administration, verified patients identity using patient s name and date of birth. Please see MAR and medication order for additional information. Patient instructed to Due to the pandemic and the need for social distancing, the pt was allowed to leave the clinic after his injection. Pt was instructed to contact the clinic at [phone] if he experienced any adverse reaction. .      Was entire vial of medication used? Yes  Vial/Syringe: Syringe  Expiration Date:  2021  Was this medication supplied by the patient? No      Jasen Guillen,  1995, presented to the clinic today at the request of Dr. Rios, ordering provider for long-acting injectable Invega Sustenna 156mg.    OBSERVATIONS:  1.  Appearance: good personal hygiene, clothing was clean and appropriate for the weather. Pt was wearing a face mask of his own. Pt reported feeling increased \"nervousness\". When writer asked if pt could explain what that fealt like and pt stated \"it just feels deeper\". Pt denies having any SI ir HI or intent. P reports that he is \"making more effort to get better sleep\". He also reports that his appetite is good.   2.  Mood: good, he was planning to go for a walk this afternoon. He has been finding \"strange\" things on his walks. Writer asked what kind of things and pt stated \"just stuff that shows up after the snow melts. If you look close, you can find some neat stuff.\"   3.  Affect: congruent   4.  Attitude: good eye contact, appeared brighter and was more engaging. Jasen suggested that we make a follow up appt for him to see his provider. He is scheduled for a video visit with    on 20 at 1230.  5.  Cooperation: full  6.  Side Effects: pt denied any SE  7.  Education: writer encouraged pt to apply ice or heat at injection iste for any pain or " discomfort.   8. Next appointment: 6/16/20 at 1200  9. Location:right deltoid     The service provided today was rendered under the supervising provider of the day, Dr. Granado, who was available for consultation as needed.

## 2020-05-20 ENCOUNTER — HOSPITAL ENCOUNTER (OUTPATIENT)
Dept: BEHAVIORAL HEALTH | Facility: CLINIC | Age: 25
Discharge: HOME | End: 2020-05-20
Attending: PSYCHIATRY & NEUROLOGY | Admitting: PSYCHIATRY & NEUROLOGY
Payer: COMMERCIAL

## 2020-05-20 PROCEDURE — 90791 PSYCH DIAGNOSTIC EVALUATION: CPT | Mod: GT | Performed by: PSYCHOLOGIST

## 2020-05-20 ASSESSMENT — COLUMBIA-SUICIDE SEVERITY RATING SCALE - C-SSRS
4. HAVE YOU HAD THESE THOUGHTS AND HAD SOME INTENTION OF ACTING ON THEM?: NO
2. HAVE YOU ACTUALLY HAD ANY THOUGHTS OF KILLING YOURSELF?: NO
6. HAVE YOU EVER DONE ANYTHING, STARTED TO DO ANYTHING, OR PREPARED TO DO ANYTHING TO END YOUR LIFE?: NO
LETHALITY/MEDICAL DAMAGE CODE MOST RECENT ACTUAL ATTEMPT: NO PHYSICAL DAMAGE OR VERY MINOR PHYSICAL DAMAGE
TOTAL  NUMBER OF ACTUAL ATTEMPTS LIFETIME: 1
1. IN THE PAST MONTH, HAVE YOU WISHED YOU WERE DEAD OR WISHED YOU COULD GO TO SLEEP AND NOT WAKE UP?: NO
TOTAL  NUMBER OF INTERRUPTED ATTEMPTS PAST 3 MONTHS: NO
ATTEMPT PAST THREE MONTHS: NO
REASONS FOR IDEATION LIFETIME: COMPLETELY TO END OR STOP THE PAIN (YOU COULDN'T GO ON LIVING WITH THE PAIN OR HOW YOU WERE FEELING)
5. HAVE YOU STARTED TO WORK OUT OR WORKED OUT THE DETAILS OF HOW TO KILL YOURSELF? DO YOU INTEND TO CARRY OUT THIS PLAN?: NO
2. HAVE YOU ACTUALLY HAD ANY THOUGHTS OF KILLING YOURSELF LIFETIME?: NO
TOTAL  NUMBER OF INTERRUPTED ATTEMPTS LIFETIME: NO
ATTEMPT LIFETIME: YES
TOTAL  NUMBER OF ABORTED OR SELF INTERRUPTED ATTEMPTS PAST LIFETIME: NO
TOTAL  NUMBER OF ABORTED OR SELF INTERRUPTED ATTEMPTS PAST 3 MONTHS: NO
3. HAVE YOU BEEN THINKING ABOUT HOW YOU MIGHT KILL YOURSELF?: NO
LETHALITY/MEDICAL DAMAGE CODE MOST RECENT POTENTIAL ATTEMPT: BEHAVIOR LIKELY TO RESULT IN DEATH DESPITE AVAILABLE MEDICAL CARE
6. HAVE YOU EVER DONE ANYTHING, STARTED TO DO ANYTHING, OR PREPARED TO DO ANYTHING TO END YOUR LIFE?: NO
4. HAVE YOU HAD THESE THOUGHTS AND HAD SOME INTENTION OF ACTING ON THEM?: NO
5. HAVE YOU STARTED TO WORK OUT OR WORKED OUT THE DETAILS OF HOW TO KILL YOURSELF? DO YOU INTEND TO CARRY OUT THIS PLAN?: YES
1. IN THE PAST MONTH, HAVE YOU WISHED YOU WERE DEAD OR WISHED YOU COULD GO TO SLEEP AND NOT WAKE UP?: YES

## 2020-05-20 ASSESSMENT — ANXIETY QUESTIONNAIRES
5. BEING SO RESTLESS THAT IT IS HARD TO SIT STILL: NEARLY EVERY DAY
GAD7 TOTAL SCORE: 7
IF YOU CHECKED OFF ANY PROBLEMS ON THIS QUESTIONNAIRE, HOW DIFFICULT HAVE THESE PROBLEMS MADE IT FOR YOU TO DO YOUR WORK, TAKE CARE OF THINGS AT HOME, OR GET ALONG WITH OTHER PEOPLE: SOMEWHAT DIFFICULT
7. FEELING AFRAID AS IF SOMETHING AWFUL MIGHT HAPPEN: NOT AT ALL
1. FEELING NERVOUS, ANXIOUS, OR ON EDGE: MORE THAN HALF THE DAYS
6. BECOMING EASILY ANNOYED OR IRRITABLE: NOT AT ALL
2. NOT BEING ABLE TO STOP OR CONTROL WORRYING: NOT AT ALL
3. WORRYING TOO MUCH ABOUT DIFFERENT THINGS: SEVERAL DAYS

## 2020-05-20 ASSESSMENT — PATIENT HEALTH QUESTIONNAIRE - PHQ9
5. POOR APPETITE OR OVEREATING: SEVERAL DAYS
SUM OF ALL RESPONSES TO PHQ QUESTIONS 1-9: 11

## 2020-05-20 NOTE — PATIENT INSTRUCTIONS
Justine Min LP    Psychologist    Behavioral Health    Progress Notes    Signed    Date of Service:  5/20/2020  9:05 AM    Creation Time:  5/20/2020  9:05 AM                  []Nan copied text    []Sally for details  Outpatient Mental Health Services - Adult     MY COPING PLAN FOR SAFETY     PATIENT'S NAME:           Jasen Guillen III  MRN:                                  [id number]  SAFETY PLAN:  Step 1: Warning signs / cues (Thoughts, images, mood, situation, behavior) that a crisis may be developing:  ? Thoughts: comman hallucinations  ? Images: none identified  ? Thinking Processes: intrusive thoughts  ? Mood: feeling overwhelmed  ? Behaviors: isolating/withdrawing   ? Situations: increased stress   Step 2: Coping strategies - Things I can do to take my mind off of my problems without contacting another person (relaxation technique, physical activity):  ? Distress Tolerance Strategies:  libraries, philosophy studies, chemistry  ? Physical Activities: go for a walk and gardening  ? Focus on helpful thoughts:  Asking questions to get out of the situation  Step 3: People and social settings that provide distraction:                 Name: needs to identify                  library     Step 4: Remind myself of people and things that are important to me and worth living for:  Family, sense of purpose  Step 5: When I am in crisis, I can ask these people to help me use my safety plan:                 Name:  Father  Step 6: Making the environment safe:   ? be around others  Step 7: Professionals or agencies I can contact during a crisis:  ? Suicide Prevention Lifeline: 8-573-407-TALK (1374)  ? Crisis Text Line Service (available 24 hours a day, 7 days a week): Text [place] to 863716  ? Call  **CRISIS (082040) from a cell phone to talk to a team of professionals who can help you.       Crisis Services By [facility]: Phone Number:   [place]     [phone]   [place]    [phone]   [place]    [phone]   [place]     [phone]   [place]    [phone]   [place] [phone]   [place]     [phone]      ? Call 911 or go to my nearest emergency department.              I helped develop this safety plan and agree to use it when needed.  I have been given a copy of this plan.       Client signature _________________________________________________________________  Today s date:  5/20/2020  Adapted from Safety Plan Template 2008 Sita Stockton and Talha Caba is reprinted with the express permission of the authors.  No portion of the Safety Plan Template may be reproduced without the express, written permission.  You can contact the authors at [email or web address] or [email or web address].
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
## 2020-05-20 NOTE — PROGRESS NOTES
Outpatient Mental Health Services - Adult    MY COPING PLAN FOR SAFETY    PATIENT'S NAME: Jasen Guillen III  MRN:   [id number]  SAFETY PLAN:  Step 1: Warning signs / cues (Thoughts, images, mood, situation, behavior) that a crisis may be developing:    Thoughts: comman hallucinations    Images: none identified    Thinking Processes: intrusive thoughts    Mood: feeling overwhelmed    Behaviors: isolating/withdrawing     Situations: increased stress   Step 2: Coping strategies - Things I can do to take my mind off of my problems without contacting another person (relaxation technique, physical activity):    Distress Tolerance Strategies:  libraries, philosophy studies, chemistry    Physical Activities: go for a walk and gardening    Focus on helpful thoughts:  Asking questions to get out of the situation  Step 3: People and social settings that provide distraction:   Name: needs to identify    library   Step 4: Remind myself of people and things that are important to me and worth living for:  Family, sense of purpose  Step 5: When I am in crisis, I can ask these people to help me use my safety plan:   Name:  Father  Step 6: Making the environment safe:     be around others  Step 7: Professionals or agencies I can contact during a crisis:    Suicide Prevention Lifeline: 3-076-647-TALK (7175)    Crisis Text Line Service (available 24 hours a day, 7 days a week): Text [place] to 748619    Call  **CRISIS (177098) from a cell phone to talk to a team of professionals who can help you.  Crisis Services By [facility]: Phone Number:   [place]     [phone]   [place]    [phone]   [place]    [phone]   [place]    [phone]   [place]    [phone]   [place] [phone]   [place]     [phone]       Call 911 or go to my nearest emergency department.     I helped develop this safety plan and agree to use it when needed.  I have been given a copy of this plan.      Client signature  _________________________________________________________________  Today s date:  5/20/2020  Adapted from Safety Plan Template 2008 Sita Stockton and Talha Caba is reprinted with the express permission of the authors.  No portion of the Safety Plan Template may be reproduced without the express, written permission.  You can contact the authors at [email or web address] or [email or web address].

## 2020-05-21 ASSESSMENT — ANXIETY QUESTIONNAIRES: GAD7 TOTAL SCORE: 7

## 2020-05-22 ENCOUNTER — TELEPHONE (OUTPATIENT)
Dept: PSYCHIATRY | Facility: CLINIC | Age: 25
End: 2020-05-22

## 2020-05-22 ENCOUNTER — VIRTUAL VISIT (OUTPATIENT)
Dept: PSYCHIATRY | Facility: CLINIC | Age: 25
End: 2020-05-22
Attending: SOCIAL WORKER
Payer: COMMERCIAL

## 2020-05-22 DIAGNOSIS — F29 PSYCHOSIS, UNSPECIFIED PSYCHOSIS TYPE (H): Primary | ICD-10-CM

## 2020-05-22 NOTE — TELEPHONE ENCOUNTER
Lynnette Rios MD  You 5 minutes ago (3:04 PM)       Yes, we may need to increase the dose, but I don't want to overmedicate him either.  It may take another week or so for the full effect of hte medication to ick in.     Please tell the father to call us over the next week if he feels that things are worseining, and I will see Bill urgently and increase the dose and give it to him earlier than the due date.     THanks.     Returned call to dad to relay the above information. Dad agrees to this plan and is appreciative of a quick response. No further action needed at this time.

## 2020-05-22 NOTE — PROGRESS NOTES
First Episode Psychosis  Strengths Program   Young Adult Group  [facility]      Patient: Jasen Guillen III (1995)     MRN: [id number]  Date:  5/22/20  Diagnosis: Psychosis, Unspecified    Video- Visit Details  Type of service:  video visit for group therapy  Time of service:    Date:  05/22/2020    Video Start Time:  3:00pm        Video End Time:  4:00pm    Reason for video visit:  Services only offered telehealth  Originating Site (patient location):  Patient's family home  Distant Site (provider location):  Remote location  Mode of Communication:  Video Conference via [employer]Well  Consent:  Patient has given verbal consent for video visit?: Yes     Number of Participants: 3  Group Time: 60 minutes  Facilitators: Howard Brizuela, Ph.D.; OZZY Lombardi; Anne Ibrahim, Psychology student;       The first episode group is based on the cognitive behavioral therapy model that uses psychoeducation, cognitive restructuring, problem solving techniques, and social skills.       Diagnostic criteria for group participation: History of psychosis      Group Topic for Today: Mindulness- defining and understanding      Description: Active Participant  Mr. Guillen was present in group today.  The patient actively participated in group discussion and patient demonstrated an appreciation of topic's application for their personal circumstances..  Jasen's emotional presentation was open and cooperative. Jasen's mood was even, and his affect was appropriate range of emotions and normal.     Plan: Continue with group goals to minimize impact of psychotic symptoms and maintain stability    OZZY Lombardi

## 2020-05-22 NOTE — TELEPHONE ENCOUNTER
Patient's dad calls wanting to know if dosage of INVEGA SUSTENNA injection should be increased due to Bill's BMI. Dad reports that patient weighs about 230-240 pounds and family hasn't noticed much improvement in Bill's mental health since starting the injection. There are no safety concerns or decrease in mental health symptoms. Writer agreed to pass question along to the provider. Advised dad that it might be something the provider will want to wait to discuss until next appointment on 6/5. Dad is OK with this.

## 2020-05-26 ENCOUNTER — HOSPITAL ENCOUNTER (OUTPATIENT)
Dept: BEHAVIORAL HEALTH | Facility: CLINIC | Age: 25
End: 2020-05-26
Attending: PSYCHIATRY & NEUROLOGY
Payer: COMMERCIAL

## 2020-05-26 ENCOUNTER — TELEPHONE (OUTPATIENT)
Dept: BEHAVIORAL HEALTH | Facility: CLINIC | Age: 25
End: 2020-05-26

## 2020-05-26 PROBLEM — F20.9 SCHIZOPHRENIA (H): Status: ACTIVE | Noted: 2020-05-26

## 2020-05-26 PROCEDURE — 90853 GROUP PSYCHOTHERAPY: CPT | Mod: GT | Performed by: PSYCHOLOGIST

## 2020-05-26 PROCEDURE — G0177 OPPS/PHP; TRAIN & EDUC SERV: HCPCS | Mod: GT | Performed by: PSYCHOLOGIST

## 2020-05-26 PROCEDURE — G0177 OPPS/PHP; TRAIN & EDUC SERV: HCPCS | Mod: 95

## 2020-05-26 ASSESSMENT — ANXIETY QUESTIONNAIRES
5. BEING SO RESTLESS THAT IT IS HARD TO SIT STILL: NEARLY EVERY DAY
2. NOT BEING ABLE TO STOP OR CONTROL WORRYING: NOT AT ALL
1. FEELING NERVOUS, ANXIOUS, OR ON EDGE: NOT AT ALL
6. BECOMING EASILY ANNOYED OR IRRITABLE: NOT AT ALL
GAD7 TOTAL SCORE: 8
3. WORRYING TOO MUCH ABOUT DIFFERENT THINGS: NOT AT ALL
IF YOU CHECKED OFF ANY PROBLEMS ON THIS QUESTIONNAIRE, HOW DIFFICULT HAVE THESE PROBLEMS MADE IT FOR YOU TO DO YOUR WORK, TAKE CARE OF THINGS AT HOME, OR GET ALONG WITH OTHER PEOPLE: NOT DIFFICULT AT ALL
7. FEELING AFRAID AS IF SOMETHING AWFUL MIGHT HAPPEN: NEARLY EVERY DAY

## 2020-05-26 ASSESSMENT — PATIENT HEALTH QUESTIONNAIRE - PHQ9
SUM OF ALL RESPONSES TO PHQ QUESTIONS 1-9: 15
5. POOR APPETITE OR OVEREATING: MORE THAN HALF THE DAYS

## 2020-05-26 NOTE — GROUP NOTE
Psychoeducation Group Note  Telemedicine Visit: The patient's condition can be safely assessed and treated via synchronous audio and visual telemedicine encounter.      Reason for Telemedicine Visit: Patient has requested telehealth visit    Originating Site (Patient Location): Patient's home    Distant Site (Provider Location): [facility]: OP    Consent:  The patient/guardian has verbally consented to: the potential risks and benefits of telemedicine (video visit) versus in person care; bill my insurance or make self-payment for services provided; and responsibility for payment of non-covered services.     Mode of Communication:  Video Conference via [employer]    As the provider I attest to compliance with applicable laws and regulations related to telemedicine.    PATIENT'S NAME: Jasen Guillen III  MRN:   [id number]  :   1995  ACCT. NUMBER: [id number]  DATE OF SERVICE: 20  START TIME: 11:00 AM  END TIME: 11:50 AM  FACILITATOR: Linda Chu PsyD  TOPIC:  RN Group: [facility]  Adult Mental Health Day Treatment  TRACK: 2A    NUMBER OF PARTICIPANTS: 5    Summary of Group / Topics Discussed:  Foundations of Health: Exercise: Why exercise: Patients evaluated and discussed their current exercise behaviors/physical activity routine and identified barriers/obstacles to meeting daily physical activity recommendations. Patients were educated on the four types of exercise: endurance, strength, balance, and flexibility. Patients were educated on the benefits of getting daily physical activity, safety tips for beginning an exercise program, and fitness goal setting strategies.     Patient Session Goals / Objectives:  ? Explained the emotional and physical benefits of exercise  ? Identified how exercise and activity affects bodily function  ? Listed ways to incorporate exercise into daily routine        Patient Participation / Response:  Fully participated with the group by  "sharing personal reflections / insights and openly received / provided feedback with other participants.     He stated that he has an imaginary friend, who is cool and helps him feel more at ease.  He stated that his family are supportive for him. He stated that it is harder lately to get things done, and that he can't do regular chores, although he stated later that he was able to get a lot of things done.  He reported that he wants to \"calm down,\" and increase \"better relationships.\"  He stated that he can get restless, pissed off at things, but after listening does talk about understanding things in a different manner, and admitted that sometimes he is confused.  Identified / Expressed personal readiness to practice skills    Treatment Plan:  Patient has a current master individualized treatment plan.  See Epic treatment plan for more information.    Dionisio Crandall Psy., MARC,  Licensed Psychologist    "

## 2020-05-26 NOTE — GROUP NOTE
Psychoeducation Group Note    PATIENT'S NAME: Jasen Guillen III  MRN:   [id number]  :   1995  ACCT. NUMBER: [id number]  DATE OF SERVICE: 20  START TIME:  9:00 AM  END TIME:  9:50 AM  FACILITATOR: Ethan Tee OTR/L  TOPIC: MH Life Skills Group: Resiliency Development  Telemedicine Visit: The patient's condition can be safely assessed and treated via synchronous audio and visual telemedicine encounter.      Reason for Telemedicine Visit: COVID-19    Originating Site (Patient Location): Patient's home    Distant Site (Provider Location): [facility]: [facility]    Consent:  The patient/guardian has verbally consented to: the potential risks and benefits of telemedicine (video visit) versus in person care; bill my insurance or make self-payment for services provided; and responsibility for payment of non-covered services.     Mode of Communication:  Video Conference via [employer]    As the provider I attest to compliance with applicable laws and regulations related to telemedicine.   Adult Mental Health Day Treatment  TRACK: 2A    NUMBER OF PARTICIPANTS: 5    Summary of Group / Topics Discussed:  Resiliency Development:  : Patients were taught how to identify stressors, signs of stress, coping skills, and prevention strategies for overall stress management.  Patients were given the opportunity to identify both ongoing and acute mental health symptoms and how to effectively manage these symptoms by developing an effective aftercare plan.  Patients increased awareness of community based resources.    Patient Session Goals / Objectives:    Identified how using coping skills can be used for symptom and stress management       Improved awareness of individualed symptoms and stressors and how to effectively cope     Established a relapse prevention plan to practice these skills in their own environments    Practiced and reflected on how to generalize taught  skills to their everyday life          Patient Participation / Response:  Moderately participated, sharing some personal reflections / insights and adequately adequately received / provided feedback with other participants.    Patient Presentation: Calm,alert,focused with stable mood and thought process. Patient was in/ot of session because of connectivity issues. Per patient this program was recommended for continued mental health recovery.    Treatment Plan:  Patient has a current master individualized treatment plan.  See Epic treatment plan for more information.    Ethan Tee, OTR/L

## 2020-05-26 NOTE — TELEPHONE ENCOUNTER
Writer completed Gato's admission to the ADT program this morning.  All questions were answered.  He did not have any safety concerns noted but did have a question about his medication.  Will plan to have the program nurse connect with him today.

## 2020-05-26 NOTE — ADDENDUM NOTE
Encounter addended by: Linda Chu PsyD on: 5/26/2020 3:13 PM   Actions taken: Clinical Note Signed, Charge Capture section accepted

## 2020-05-26 NOTE — GROUP NOTE
"Process Group Note  Telemedicine Visit: The patient's condition can be safely assessed and treated via synchronous audio and visual telemedicine encounter.      Reason for Telemedicine Visit: Patient has requested telehealth visit    Originating Site (Patient Location): Patient's home    Distant Site (Provider Location): [facility]: OP    Consent:  The patient/guardian has verbally consented to: the potential risks and benefits of telemedicine (video visit) versus in person care; bill my insurance or make self-payment for services provided; and responsibility for payment of non-covered services.     Mode of Communication:  Video Conference via [employer]    As the provider I attest to compliance with applicable laws and regulations related to telemedicine.    PATIENT'S NAME: Jasen Guillen III  MRN:   [id number]  :   1995  ACCT. NUMBER: [id number]  DATE OF SERVICE: 20  START TIME: 10:00 AM  END TIME: 10:50 AM  FACILITATOR: Linda Chu PsyD  TOPIC:  Process Group    Diagnoses:      Adult Mental Health Day Treatment  TRACK: 2A    NUMBER OF PARTICIPANTS: 5          Data:    Session content: At the start of this group, patients were invited to check in by identifying themselves, describing their current emotional status, and identifying issues to address in this group.   Area(s) of treatment focus addressed in this session included Symptom Management, Personal Safety and Community Resources/Discharge Planning.    Client reported being safe today.  Reported mood is frustrated.       Goal for today is to attend the group.     The client talked to the group about how he had computer problems with the connection, and how he had an imaginary friend, who is cool, and is a spiritual being, and helps him feel more at ease. He reported that he wants to \"calm down.\" When asked if this related to anxiety or restlessness, he said no.  He reported that he has been able to get a lot of things " done, but later said he was confused.  He reported that he stays in all the time due to COVID-19 and if afraid to be out in the community.      Therapeutic Interventions/Treatment Strategies:  Psychotherapist offered support, feedback and validation, provided redirection and reinforced use of skills. Treatment modalities used include Cognitive Behavioral Therapy and Dialectical Behavioral Therapy. Interventions include Cognitive Restructuring:  Facilitated recognition of the connection between negative thoughts and negative core beliefs, Coping Skills: Discussed use of self-soothe skills to decrease distress in the body and Mindfulness: Facilitated discussion of when/how to use mindfulness skills to benefit general health, mental health symptoms, and stressors.    Assessment:    Patient response:   Patient responded to session by accepting feedback, giving feedback, listening, focusing on goals and being attentive    Possible barriers to participation / learning include: severity of symptoms    Health Issues:   None reported       Substance Use Review:   Substance Use: No active concerns identified.    Mental Status/Behavioral Observations  Appearance:   N/A  Eye Contact:   N/A  Psychomotor Behavior: N/A  Attitude:   Cooperative   Orientation:   All  Speech   Rate / Production: Normal/ Responsive Normal    Volume:  Normal   Mood:    Anxious   Affect:    Constricted   Thought Content:   Rumination and Psychosis reports hallucinations visual  Thought Form:  Coherent  Logical  Psychosis    Insight:    Good  and Fair     Plan:     Safety Plan: Recommended that patient call 911 or go to the local ED should there be a change in any of these risk factors.     Barriers to treatment: None identified    Patient Contracts (see media tab):  None    Substance Use: Not addressed in session     Continue or Discharge: Patient will continue in Adult Day Treatment (ADT)  as planned. Patient is likely to benefit from learning and  using skills as they work toward the goals identified in their treatment plan.      Linda Chu PsyD  May 26, 2020  Roque Torres, MARC,  Licensed Psychologist

## 2020-05-27 ASSESSMENT — ANXIETY QUESTIONNAIRES: GAD7 TOTAL SCORE: 8

## 2020-05-28 ENCOUNTER — TELEPHONE (OUTPATIENT)
Dept: BEHAVIORAL HEALTH | Facility: CLINIC | Age: 25
End: 2020-05-28

## 2020-05-28 ENCOUNTER — HOSPITAL ENCOUNTER (OUTPATIENT)
Dept: BEHAVIORAL HEALTH | Facility: CLINIC | Age: 25
End: 2020-05-28
Attending: PSYCHIATRY & NEUROLOGY
Payer: COMMERCIAL

## 2020-05-28 PROCEDURE — 90853 GROUP PSYCHOTHERAPY: CPT | Mod: 95 | Performed by: PSYCHOLOGIST

## 2020-05-28 PROCEDURE — G0177 OPPS/PHP; TRAIN & EDUC SERV: HCPCS | Mod: 95

## 2020-05-28 NOTE — PROGRESS NOTES
Patient Active Problem List   Diagnosis     Schizophrenia, unspecified type (H)     Schizophrenia (H)       Current Outpatient Medications:      LORazepam (ATIVAN) 0.5 MG tablet, Take 1 tablet (0.5 mg) by mouth nightly as needed for sleep (restlessness), Disp: 20 tablet, Rfl: 1     paliperidone (INVEGA SUSTENNA) 156 MG/ML RADHA injection, Inject 1 mL (156 mg) into the muscle every 28 days, Disp: 1 mL, Rfl: 5     paliperidone ER (INVEGA) 3 MG 24 hr tablet, Take 1 tablet (3 mg) by mouth every morning, Disp: 30 tablet, Rfl: 1     paliperidone ER (INVEGA) 9 MG 24 hr tablet, Take 1 tablet (9 mg) by mouth At Bedtime, Disp: 30 tablet, Rfl: 1    Current Facility-Administered Medications:      paliperidone (INVEGA SUSTENNA) injection RADHA 156 mg, 156 mg, Intramuscular, Q28 Days, Lynnette Rios MD, 156 mg at 05/19/20 1200  Psychiatry staffing: case discussed  Diagnosis:  As above, psychotic symptoms and low concentration present.  Recent start

## 2020-05-28 NOTE — GROUP NOTE
Psychotherapy Group Note  Telemedicine Visit: The patient's condition can be safely assessed and treated via synchronous audio and visual telemedicine encounter.      Reason for Telemedicine Visit: Patient has requested telehealth visit    Originating Site (Patient Location): Patient's home    Distant Site (Provider Location): [facility]: OP    Consent:  The patient/guardian has verbally consented to: the potential risks and benefits of telemedicine (video visit) versus in person care; bill my insurance or make self-payment for services provided; and responsibility for payment of non-covered services.     Mode of Communication:  Video Conference via [employer]    As the provider I attest to compliance with applicable laws and regulations related to telemedicine.    PATIENT'S NAME: Jasen Guillen III  MRN:   [id number]  :   1995  ACCT. NUMBER: [id number]  DATE OF SERVICE: 20  START TIME: 11:00 AM  END TIME: 11:50 AM  FACILITATOR: Linda Chu PsyD  TOPIC:  EBP Group: Emotions Management  Adult Mental Health Day Treatment  TRACK: 2A    NUMBER OF PARTICIPANTS: 4    Summary of Group / Topics Discussed:  Emotions Management: Guilt and Shame: Patients explored and shared personal experiences associated with feelings of guilt and shame.  Group explored how these feelings develop, what they mean to each individual, and how to increase acceptance and usefulness of these feelings.  Group members assisted one another to contextualize these concepts and promote healing.     Patient Session Goals / Objectives:    Discuss and review definitions and personal views/experiences with guilt and shame    Understand the differences between guilt and shame    Explore how feelings of guilt and shame impact functioning    Understand and practice strategies to manage difficult emotions and move towards healing    Understand and normalize difficult emotions through group discussion    Understand the  "utility of guilt and shame    Target  unwanted  emotions for change    Gato reported that he uses distraction skills and gratitude. He talked about people who were inspirational for him, such as his dad and mom and sister.  He reported that he tries to be non-judgmental and stay in the present moment.  He reported having philosophical arguments with himself about \"what if this happened.\"  Patient Participation / Response:  Fully participated with the group by sharing personal reflections / insights and openly received / provided feedback with other participants.    Expressed understanding of the relevance / importance of emotions management skills at distressing times in life    Treatment Plan:  Patient has a current master individualized treatment plan.  See Epic treatment plan for more information.    Dionisio Crandall Psy., MARC,  Licensed Psychologist    "

## 2020-05-28 NOTE — GROUP NOTE
Psychoeducation Group Note    PATIENT'S NAME: Jasen Guillen III  MRN:   [id number]  :   1995  ACCT. NUMBER: [id number]  DATE OF SERVICE: 20  START TIME: 10:00 AM  END TIME: 10:50 AM  FACILITATOR: Ethan Tee OTR/L  TOPIC: MH Life Skills Group: Resiliency Development  Telemedicine Visit: The patient's condition can be safely assessed and treated via synchronous audio and visual telemedicine encounter.      Reason for Telemedicine Visit: COVID-19    Originating Site (Patient Location): Patient's home    Distant Site (Provider Location): [facility]: [facility]    Consent:  The patient/guardian has verbally consented to: the potential risks and benefits of telemedicine (video visit) versus in person care; bill my insurance or make self-payment for services provided; and responsibility for payment of non-covered services.     Mode of Communication:  Video Conference via [employer]    As the provider I attest to compliance with applicable laws and regulations related to telemedicine.   Adult Mental Health Day Treatment  TRACK: 2A    NUMBER OF PARTICIPANTS: 5    Summary of Group / Topics Discussed:  Resiliency Development:  Stress Management and thought reaction: Patients were taught how to identify stressors, signs of stress, coping skills, and prevention strategies for overall stress management.  Patients were given the opportunity to identify both ongoing and acute mental health symptoms and how to effectively manage these symptoms by developing an effective aftercare plan.  Patients increased awareness of community based resources.    Patient Session Goals / Objectives:    Identified how using coping skills can be used for symptom and stress management       Improved awareness of individualed symptoms and stressors and how to effectively cope     Established a relapse prevention plan to practice these skills in their own environments    Practiced and reflected on how to  generalize taught skills to their everyday life          Patient Participation / Response:  Minimally participated, only when prompted / asked.    Patient Presentation: Patient seemed restless and anxious and also had some troubles with his telemedicine connection. Patient talked about some issues related to personal thoughts which others in his family do not like. Patient finds the situation to be distressful and uncomfortable.    Treatment Plan:  Patient has a current master individualized treatment plan.  See Epic treatment plan for more information.    Ethan Tee, OTR/L

## 2020-05-28 NOTE — ADDENDUM NOTE
Encounter addended by: Ethan Tee, OTR/L on: 5/28/2020 12:21 PM   Actions taken: Clinical Note Signed

## 2020-05-28 NOTE — ADDENDUM NOTE
Encounter addended by: Linda Chu PsyD on: 5/28/2020 4:59 PM   Actions taken: Order Reconciliation Section accessed

## 2020-05-28 NOTE — TELEPHONE ENCOUNTER
Phone call Gato to leave a message and try to get him connected with Virtual therapy  In Adult Day [place]    Contacted dad, emergency contact to inform him that Gato had previous problems connecting.    Yeni Torres., D,  Licensed Psychologist

## 2020-05-29 ENCOUNTER — VIRTUAL VISIT (OUTPATIENT)
Dept: PSYCHIATRY | Facility: CLINIC | Age: 25
End: 2020-05-29
Attending: SOCIAL WORKER
Payer: COMMERCIAL

## 2020-05-29 DIAGNOSIS — F29 PSYCHOSIS, UNSPECIFIED PSYCHOSIS TYPE (H): Primary | ICD-10-CM

## 2020-06-01 ENCOUNTER — TELEPHONE (OUTPATIENT)
Dept: BEHAVIORAL HEALTH | Facility: CLINIC | Age: 25
End: 2020-06-01

## 2020-06-01 ENCOUNTER — TELEPHONE (OUTPATIENT)
Dept: PSYCHIATRY | Facility: CLINIC | Age: 25
End: 2020-06-01

## 2020-06-01 DIAGNOSIS — F20.9 SCHIZOPHRENIA, UNSPECIFIED TYPE (H): ICD-10-CM

## 2020-06-01 RX ORDER — PALIPERIDONE 9 MG/1
9 TABLET, EXTENDED RELEASE ORAL AT BEDTIME
Qty: 30 TABLET | Refills: 0 | Status: SHIPPED | OUTPATIENT
Start: 2020-06-01 | End: 2020-06-02
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
## 2020-06-01 NOTE — TELEPHONE ENCOUNTER
Phone call to Gato and his dad to help  Him get connected to the group.    Roque Torres, D,  Licensed Psychologist

## 2020-06-01 NOTE — TELEPHONE ENCOUNTER
Discussed situation with the provider. She is unable to contact dad before the pt's office visit on Friday, 6/5, but would like to speak with him after. Writer placed 1 pm slot on hold and will officially schedule once provider responds.    At this time, she would like pt to start oral medication as well and suggested oral paliperidone 9 mg daily.    Lastly, she asked that OZZY Lombardi is notified and asks that she connect with dad today if possible.

## 2020-06-01 NOTE — TELEPHONE ENCOUNTER
[facility] Call Center    Phone Message    May a detailed message be left on voicemail: yes     Reason for Call: Other: Pt's symptoms are getting severe and dad called to discuss with the nurse.      Action Taken: Other: [facility] [employer] [facility]    Travel Screening: Not Applicable

## 2020-06-01 NOTE — TELEPHONE ENCOUNTER
Writer called Gato pt's father at [phone]. The pt's father called to inform the team that the pt continues to decompensate. He informed this writer that pt is not participating in day treatment. Dad has received calls from Linda Chu almost every day that pt is not participating. Also, the pt seems to be more aggressive, similar to 2016. Although, dad is not concerned that he's currently a risk to himself or others. Still, he's worried things could escalate to that point. Pt also continues to be delusional and confused.     Per dad, the pt can no longer live with his grandparents and sister and will need to move out next week. Dad is still deciding on a residential facility. He also wants the team to be aware that the pt's mother is currently psychotic and trying to convince the pt to stop his medications.    At this time, dad is asking to add another medication to help with the pt's ongoing psychosis symptoms. He's asking to speak with the provider directly if possible. Agreed to reach out to the provider to get something scheduling.    Lastly, dad wanted to share his disappointment with the pt's care team and how he was never contacted when the pt's injection was stopped. In the future, he asks to be contacted anytime there is a large medication change like the discontinuation of the injection. Writer voiced understanding and agreed to relay this to the team and follow up with him anytime a significant change is made.

## 2020-06-02 ENCOUNTER — TELEPHONE (OUTPATIENT)
Dept: BEHAVIORAL HEALTH | Facility: CLINIC | Age: 25
End: 2020-06-02

## 2020-06-02 ENCOUNTER — HOSPITAL ENCOUNTER (OUTPATIENT)
Dept: BEHAVIORAL HEALTH | Facility: CLINIC | Age: 25
End: 2020-06-02
Attending: PSYCHIATRY & NEUROLOGY
Payer: COMMERCIAL

## 2020-06-02 PROCEDURE — 90853 GROUP PSYCHOTHERAPY: CPT | Mod: GT

## 2020-06-02 RX ORDER — PALIPERIDONE 9 MG/1
9 TABLET, EXTENDED RELEASE ORAL AT BEDTIME
Qty: 30 TABLET | Refills: 0 | Status: SHIPPED | OUTPATIENT
Start: 2020-06-02

## 2020-06-02 NOTE — TELEPHONE ENCOUNTER
Called pt and confirmed the [facility] [address] is correct. 30 d/s of oral paliperidone 9 mg sent to this pharmacy.    Called pt's father and notified him of the above.

## 2020-06-02 NOTE — TELEPHONE ENCOUNTER
Pt called back and stated he would prefer to have his medications sent to the [facility] in [place].     Michelle Andrews,

## 2020-06-02 NOTE — PROGRESS NOTES
Acknowledgement of Current Treatment Plan       I have reviewed my treatment plan with my therapist / counselor on 6/2/2020  .   I agree with the plan as it is written in the electronic health record. (2A)    Name:      Signature:  Jasen Guillen III   Unavailable for signature due to COVID19   Dr Ray Barnes MD  Psychiatrist    Dr. Ester Greene, Psy. D, LP    Linda Chu PsCody,  Licensed Psychologist    OZZY Jacome   Unavailable for signature      OZZY Jacome

## 2020-06-02 NOTE — TELEPHONE ENCOUNTER
Received incoming phone call from pt's father. He informed this writer that pt never received the VM from yesterday and asked that writer call him again to confirm which pharmacy the oral medication can be sent to. Per dad, pt is living in [place], so he will need to provide information for a local pharmacy. He asked that writer call him back if writer does not receive a response from the pt. Will set reminder to call dad if pt does not c/b by tomorrow at noon.    Also, dad is looking forward to hearing from [facility], as pt is interested in treatment options in [place]. Dad would like to discuss this further with Renetta. Informed him that she should be calling him this afternoon.

## 2020-06-02 NOTE — TELEPHONE ENCOUNTER
Writer attempted to call Pt at 12:00, noon today to complete the initial treatment plan as scheduled.  He did not answer. Will attempt again soon.

## 2020-06-02 NOTE — TELEPHONE ENCOUNTER
Renetta Springer LICSW Labossiere, Laura, RN    Caller: Unspecified (Yesterday, 10:58 AM)               Thanks Jinny for the updates.  I am off on Monday's until mid-July.  I will call dad now.     Of note, Gato has been joining young adult group each Friday for the last 3 weeks.  It is hard to point out symptoms in the video group format, but for what it's worth he participates and doesn't seem too distressed by symptoms based on my observation.

## 2020-06-02 NOTE — TELEPHONE ENCOUNTER
Writer did get in contact with Pt and completed the Initial Treatment plan today. Will inform treatment team of the goals discussed.

## 2020-06-03 NOTE — GROUP NOTE
"Process Group Note    PATIENT'S NAME: Jasen Guillen III  MRN:   [id number]  :   1995  ACCT. NUMBER: [id number]  DATE OF SERVICE: 20  START TIME: 10:00 AM  END TIME: 10:50 AM  FACILITATOR: Milli Hillman LICSW  TOPIC:  Process Group    Diagnoses:  295.90  (F20.9) Schizophrenia       Adult Mental Health Day Treatment  TRACK: 2A  Telemedicine Visit: The patient's condition can be safely assessed and treated via synchronous audio and visual telemedicine encounter.      Reason for Telemedicine Visit: Services only offered telehealth    Originating Site (Patient Location): Patient's home    Distant Site (Provider Location): [facility]: [facility]    Consent:  The patient/guardian has verbally consented to: the potential risks and benefits of telemedicine (video visit) versus in person care; bill my insurance or make self-payment for services provided; and responsibility for payment of non-covered services.     Mode of Communication:  Video Conference via [employer]    As the provider I attest to compliance with applicable laws and regulations related to telemedicine.     NUMBER OF PARTICIPANTS: 5          Data:    Session content: At the start of this group, patients were invited to check in by identifying themselves, describing their current emotional status, and identifying issues to address in this group.   Area(s) of treatment focus addressed in this session included Symptom Management and Personal Safety.  Pt states that he is joining only by audio, due to wifi issues and not wanting to be seen by others. He states that he has been feeling down and stuck. He states \"I am working on facing my fears. I want to be honest and to be myself. I want to maintain my image. I wish I was a professional\". Pt identifies goals as manage anxiety and be more productive. Denies safety concerns.    Therapeutic Interventions/Treatment Strategies:  Psychotherapist offered support, feedback and " validation and reinforced use of skills.    Assessment:    Patient response:   Patient responded to session by accepting feedback, listening, focusing on goals, being attentive and accepting support    Possible barriers to participation / learning include: and no barriers identified    Health Issues:   None reported       Substance Use Review:   Substance Use: No active concerns identified.    Mental Status/Behavioral Observations  Appearance:   N/A   Eye Contact:   N/A   Psychomotor Behavior: N/A   Attitude:   Cooperative   Orientation:   All  Speech   Rate / Production: Normal/ Responsive Normal    Volume:  Normal   Mood:    Anxious   Affect:    N/A   Thought Content:   Clear  Thought Form:  Coherent  Logical     Insight:    Fair     Plan:     Safety Plan: No current safety concerns identified.  Recommended that patient call 911 or go to the local ED should there be a change in any of these risk factors.     Barriers to treatment: None identified    Patient Contracts (see media tab):  None    Substance Use: Not addressed in session     Continue or Discharge: Patient will continue in Adult Day Treatment (ADT)  as planned. Patient is likely to benefit from learning and using skills as they work toward the goals identified in their treatment plan.      Milli Dodge, LICSW  Violeta 3, 2020

## 2020-06-04 ENCOUNTER — HOSPITAL ENCOUNTER (OUTPATIENT)
Dept: BEHAVIORAL HEALTH | Facility: CLINIC | Age: 25
End: 2020-06-04
Attending: PSYCHIATRY & NEUROLOGY
Payer: COMMERCIAL

## 2020-06-04 ENCOUNTER — TELEPHONE (OUTPATIENT)
Dept: PSYCHIATRY | Facility: CLINIC | Age: 25
End: 2020-06-04

## 2020-06-04 PROCEDURE — G0177 OPPS/PHP; TRAIN & EDUC SERV: HCPCS | Mod: GT

## 2020-06-04 NOTE — TELEPHONE ENCOUNTER
"First Episode Psychosis Program   Follow-Up  [facility]    Patient Name:  Jasen Guillen III  /Age:  1995 (24 year old)    Reason for Follow-Up:  Change in symptoms- family looking into higher level of care options    I had a phone call with pt's father, \"Gato\". [phone].  He reports that his son Gato has had a hard time engaging in the day treatment program, that his symptoms do not appear to be improving too rapidly, and now he is being asked to move out of his grandparents home in the next 5 days.    Weighing several care options within [place], residential programs would require a change in insurance and a waitlist.  Admission couldn't be possible in the next week, however admission to a crisis program would be more realistic, allowing time to arrange for [place] MA and referrals to residential program.    However, pt had previously been to a private pay residential program in [place] called [facility].  Dad has been in contact with them and they do have openings for him to return.  We weighed various pros/cons of the program, but ultimately this program would be most supportive of Gato immediate needs and is affordable for the family.  [email or web address]    In a later follow-up call with pt's father, he reports that he and Bill talked about [facility], and Gato is agreeable and looking forward to going.  [facility].      Writer will coordinate with clinic staff regarding his injection and JONNY's.      Writer also suggested to dad that we update the consent to communicate forms to include email, and adding pt's mother.  Dad eluded to the idea that Areli mother might not be supportive of this plan, and I want to offer the ability to talk directly to mom if need be.     Plan:  Provided patient with writer's contact information and availability. Also updated dad via a VMM of the below.     Clinic staff-  -Jinny MONDRAGON will contact Gato to schedule " his injection on Monday 6/8.   -Sariah SAMPSON To obtain JONNY for [email or web address]/, update consent to communicate to include mom & dad, and complete consent to communicate via email when Bill comes to clinic for his injection.   -Dr. Rios will have a virtual visit with the pt on 6/5 @ 12:30pm via [employer] Zoom Meeting [email or web address]    Will route to patient's provider(s) as an FYI.  Please call or EPIC message with any questions or concerns.    HELIO Lombardi, LICSW  [phone]

## 2020-06-04 NOTE — GROUP NOTE
Psychoeducation Group Note    PATIENT'S NAME: Jasen Guillen III  MRN:   [id number]  :   1995  ACCT. NUMBER: [id number]  DATE OF SERVICE: 20  START TIME:  9:00 AM  END TIME:  9:50 AM  FACILITATOR: Ethan Tee OTR/L  TOPIC: MH Life Skills Group: Resiliency Development  Telemedicine Visit: The patient's condition can be safely assessed and treated via synchronous audio and visual telemedicine encounter.      Reason for Telemedicine Visit: COVID-19    Originating Site (Patient Location): Patient's home    Distant Site (Provider Location): [facility]: [facility]    Consent:  The patient/guardian has verbally consented to: the potential risks and benefits of telemedicine (video visit) versus in person care; bill my insurance or make self-payment for services provided; and responsibility for payment of non-covered services.     Mode of Communication:  Video Conference via [employer]    As the provider I attest to compliance with applicable laws and regulations related to telemedicine.   Adult Mental Health Day Treatment  TRACK: 2A    NUMBER OF PARTICIPANTS: 5    Summary of Group / Topics Discussed:  Resiliency Development:  Stress Resistance Resources(Sleep/Exercise/Nutrition): Patients were taught how to identify stressors, signs of stress, coping skills, and prevention strategies for overall stress management.  Patients were given the opportunity to identify both ongoing and acute mental health symptoms and how to effectively manage these symptoms by developing an effective aftercare plan.  Patients increased awareness of community based resources.    Patient Session Goals / Objectives:    Identified how using coping skills can be used for symptom and stress management       Improved awareness of individualed symptoms and stressors and how to effectively cope     Established a relapse prevention plan to practice these skills in their own environments    Practiced and  reflected on how to generalize taught skills to their everyday life          Patient Participation / Response:  Fully participated with the group by sharing personal reflections / insights and openly received / provided feedback with other participants.    Patient Presentation: Calm,alert,focused with stable mood and thought process.    Treatment Plan:  Patient has a current master individualized treatment plan.  See Epic treatment plan for more information.    Ethan Tee, OTR/L

## 2020-06-04 NOTE — TELEPHONE ENCOUNTER
Writer received incoming call from OZZY Lombardi. She informed this writer that pt will be going to a residential facility center in [place] on Tuesday, 6/9. She is wondering if pt can receive Invega Sustenna 156 mg on Monday, 6/8 (8 days early).     Sent message to Dr. Rios and Sarah Peraza, pharm D. Dr. Rios approved of giving Invega Sustenna 156 mg on 6/8 (8 days early). She confirmed that he was on a higher dose previously and said it's appropriate.     Will notify staff in clinic. Patient will also need to complete JONNY for [facility] in [place] a the time of his injection.     Called pt and LVM requesting a c/b to schedule injection on 6/8.

## 2020-06-05 ENCOUNTER — VIRTUAL VISIT (OUTPATIENT)
Dept: PSYCHIATRY | Facility: CLINIC | Age: 25
End: 2020-06-05
Attending: SOCIAL WORKER
Payer: COMMERCIAL

## 2020-06-05 ENCOUNTER — TELEPHONE (OUTPATIENT)
Dept: PSYCHIATRY | Facility: CLINIC | Age: 25
End: 2020-06-05

## 2020-06-05 DIAGNOSIS — F29 PSYCHOSIS, UNSPECIFIED PSYCHOSIS TYPE (H): Primary | ICD-10-CM

## 2020-06-05 NOTE — TELEPHONE ENCOUNTER
"Karolina Jessica Laura, RN               Good morning Jinny,     This patient is good to go, per FDA guidelines, \"adjust dose monthly based on efficacy and tolerability.\"     Karolina Bonner        "

## 2020-06-05 NOTE — PROGRESS NOTES
First Episode Psychosis  Strengths Program   Young Adult Group  [facility]      Patient: Jasen Guillen III (1995)     MRN: [id number]  Date:  5/29/20  Diagnosis: Psychosis, Unspecified    Video- Visit Details  Type of service:  video visit for group therapy  Time of service:    Date:  5/29/20   Video Start Time:  3 PM       Video End Time:  4 PM    Reason for video visit:  Services only offered telehealth to reduce exposure risk of COVID-19  Originating Site (patient location):  Friend or family home  Distant Site (provider location):  Remote location  Mode of Communication:  Video Conference via [employer]  Consent:  Patient has given verbal consent for video visit?: Yes     Number of Participants: 4  Group Time: 60 minutes  Facilitators: Howard Brizuela, Ph.D.; OZZY Lombardi; Anne Ibrahim, Psychology student;       The first episode group is based on the cognitive behavioral therapy model that uses psychoeducation, cognitive restructuring, problem solving techniques, and social skills.       Diagnostic criteria for group participation: History of psychosis      Group Topic for Today: Mindfulness noticing exercise      Description: Active Participant  Mr. Guillen was present in group today.  The patient actively participated in group discussion.  Jasen's emotional presentation was open, cooperative, though somewhat perseverative on mindfulness practicies. Jasen's mood was even, and his affect was subdued.     Plan: Continue with group goals to minimize impact of psychotic symptoms and maintain stability    OZZY Lombardi

## 2020-06-05 NOTE — TELEPHONE ENCOUNTER
On 6/5/2020, at 1215, writer called patient at mobile to confirm Virtual Visit. Writer unable to make contact with patient. Writer left detailed voice message for callback. [phone], left as call back number. NASIR Mendez, EMT

## 2020-06-05 NOTE — PROGRESS NOTES
"VIDEO VISIT  Jasen Guillen III is a 24 year old patient who is being evaluated via a billable video visit.      At the patient's request, one of his housemates joined part of the visit to share his observations.    The patient has been notified of following:   \"We have found that certain health care needs can be provided without the need for an in-person physical exam. This service lets us provide the care you need with a video conversation. If a prescription is necessary we can send it directly to your pharmacy. If lab work is needed we can place an order for that and you can then stop by our lab to have the test done at a later time. Insurers are generally covering virtual visits as they would in-office visits so billing should not be different than normal.  If for some reason you do get billed incorrectly, you should contact the billing office to correct it and that number is in the AVS .    Patient has given verbal consent for video visit?: Yes  How would you like to obtain your AVS?: Not needed.  AVS SmartPhrase has been placed in 'Patient Instructions':  NA    Video- Visit Details  Type of service:  video visit for medical evaluation and management.   Time of service:    Date: April 17    Video Start Time: 12:30        Video End Time:  1:00    Reason for video visit:  Psychiatry outpatient follow-up visit.  Originating Site (patient location):  Patient's home  Distant Site (provider location):  Provider home office.  Mode of Communication:  Video Conference via [email or web address]        PSYCHIATRY CLINIC PROGRESS NOTE     CC:  \"People are worried about me.\"    INTERIM HISTORY                                                   Jasen Guillen III is a 24 year old male with a diagnosis of schizophrenia (for past ~ 5 years) who comes in for a scheduled appointment today. We met for medication eval and management, as well as 20 minutes of supportive psychotherapy.    Patient had been receiving monthly  paliperidone " "injections, 156 mg, a decrease from his original dose of 254 mg (decreased over a year ago).  Overall, he had done very well on this lower dose.  No psychotic sx, no ocular movements, no motor side effects.     However, over past 3 weeks, due to covid-19 crisis, pt  had not wanted to come in to clinic for his injection (us afraid of jimmy the virus), and switched himself to some oral meds (9 mg) that he has at home.    He felt t hat he had been doing well on the oral meds-- \"I can think more clearly and get more done\"-- but his housemates have been getting increasingly concerned about him, as is his family.  Pt is unable to articulate what exactly there concerns are, but says \"Maybe my thinking does not seem as clear.\"    PSYCH ROS:    Positive Psychotic symptoms:      Denies farnk ideas of reference, but says there may be some hidden reasons why things are happening to him or why he is perceiving certain things.  Does not report any feelings of paranoia or being in danger.  Denies hallucinations. Does admit to  Having difficulty with thinking. Is having irregular sleep and some changes in mood (more irritable, but says this is due to having many interests in aspects of medieval history, early engineering projects, philosophical writings, etc.    Negative psychotic symptoms:    Does not report  anhedonia and amotivation.  Does admit to increased social isolation.    Mood symptoms:     Denies elevated mood or irritability. Denies sx of depression and anxiety.   No feelings of hopelessness or of self-harm. No SI.    Cognition:    Attention and concentration aare decreased    Substance use: Not present.    Obsessive-compulsive symptoms: Denies prior experience of unwanted graphic sexual imagery that is upsetting. No compulsive behavior.    Motor symptoms:  No tic-like behavior, as described in prior notes.    MEDICAL ROS:       Constitutional: No fever, fatigue.    Neurologic: Denies problems with salivation, " muscle stiffness, motor restlessness. Eye spasms have resolved.  +  motor restlessness. No dizziness.  HEENT: Mild dry mouth. No dry eyes.  Cardiovascular: No chest pain or palpitations.  Respiratory: No shortness of breath, dyspnea on exertion, or wheezing.  Gastrointestinal: No nausea or constipation  : No symptoms reported  Musculoskeletal:No musculoskeletal difficulties.  Skin: WNL.  Endocrine: No abnormalities.    PSYCHOSOCIAL HX:    Continues to maintain his close relationships with housemates and friends in [employer] group-- is able to share his personal hx with them and get support.    Plans to enter the novitiate at a campus near [place] when able to travel again.       PAST PSYCH HISTORY:    Received intensive treatment 5 years ago for an enduring episode of psychosis that was characterized predominantly by ideas of reference (feeling that he was being talked about  over the Internet) he also experienced disorganized thinking and disorganized behavior.    Of note, this patient's past andrecent presentation has been characterized by the presence of mild chronic dyskinetic movements, now improved.    ALLERGY                                Review of patient's allergies indicates no known allergies.  MEDICATIONS                               Previously receiving Paliperidone 156 m IM q 28 days    Now taking paliperidone 9 mg at bedtime.    Reports that he is adherent.           VITALS       There were no vitals taken for this visit.    MENTAL STATUS EXAM                                                             Orientation:  x4  Gait and Station:  Not observed.  Motor:  Restless upper body movements.  Alertness: Alert, oriented.  Appearance: Neatly and casually dressed.  Behavior/Demeanor: Cooperative, pleasant, with decreased contact.  Appropriately interactive.  Speech: Slightly slowed, normal volume.  Language: Language is vague and impoverished. Is unable to describe specifics of what he is experiencing or  "what others are concerned about.  Psychomotor:PM speed seems a bit slowed.  Mood: \"up and down.\"  Affect: Veryrestricted.  Thought Process/Associations: Very disorganized.  Thought Content: Denies specific delusional thoughts, ideas of reference, SI. HI.  Says he is feeling that \"things are connected\"  Perception:  Denies hallucinations. No illusions.  Insight: Fair--recognizes that others are concerned..  Judgment: Willing to accept  treatment.  Cognition: Reduced  Recent and Remote Memory: Impaired  Attention Span and Concentration:  Decreased  Fund of Knowledge:Good        DIAGNOSIS/ ASSESSMENT     Schizophrenia, experiencing marked increase in disorganized symptoms and in hyper-salience experiences, due to not getting injection on schedule and switching to oral dose.  Major depression, in remission.  Mild dyskinesias and possible akathisia in past. Difficult to disentangle what might be medication induced from what might be primary pathology. There is a strong motor system presentation in this pt.         PLAN                                                                                                       1) Positive/ disorganized Psychotic Sx:   Pt was able to be persuaded to come to clinic for paliperisonde injection. He will get loading dose later today and then second loading dose in 4 days. We will start with 156 mg, as he had done well on this for over a year.        2) Negative psychotic symptoms:      Emerging as part of his decompensation.    3) Mood and anxiety symptoms:   Not prominent     4) Disturbed Sleep  Pt may use lorazepam 0.5 mg at bedtime prn agitation or insomnia, for a few nights.. Have recommended a focus on regular sleep and exericse. He has not been adherent with this.    5)  RTC in 3 weeks.        TREATMENT RISK STATEMENT:  The risks, benefits, alternatives and potential adverse effects have been discussed and are understood by the patient/ patient's guardian. The pt understands " "the risks of using street drugs or alcohol.  There are no medical contraindications, the pt agrees to treatment with the ability to do so.  The patient understands to call 911 or come to the nearest ED if life threatening or urgent symptoms present.    PSYCHIATRY CLINIC INDIVIDUAL PSYCHOTHERAPY NOTE   Length of session: 20 minutes, 12:40 to 1 pm     Subjective: \"Yes I will come in for the injections.\"   Plan:  Continue supportive psychotherapy.         Psychotherapy services during this visit included  myself and Jasen Guillen.  Issues addressed in therapy included: nneed to start injectable paliperidone immediately.     TREATMENT  PLAN          SYMPTOMS;PROBLEMS   MEASURABLE GOALS;    FUNCTIONAL IMPROVEMENT INTERVENTIONS;    GAINS MADE DISCHARGE CRITERIA   Low energy, lack of motivation Patient will experience a significant improvement in energy and motivation, and will be able to engage in outsidesocializing activities as well as classwork Supportive Psychotherapy once per month    Patient enjoying active social life Resolved   Ideas of reference during times of stress Patient will no longer experience ideas of reference Monthly injectable paliperidone  CBT techniques  IOR improved after recent flare-up Two years without symptoms, followed by two years of monitoring without symptoms relapse   Re-emergence of acute psychotic sx   Sx control  Pt to start injectable paliperidone again Psychiatric stability               "

## 2020-06-08 ENCOUNTER — HOSPITAL ENCOUNTER (OUTPATIENT)
Dept: BEHAVIORAL HEALTH | Facility: CLINIC | Age: 25
End: 2020-06-08
Attending: PSYCHIATRY & NEUROLOGY
Payer: COMMERCIAL

## 2020-06-08 ENCOUNTER — ALLIED HEALTH/NURSE VISIT (OUTPATIENT)
Dept: PSYCHIATRY | Facility: CLINIC | Age: 25
End: 2020-06-08
Payer: COMMERCIAL

## 2020-06-08 DIAGNOSIS — F20.9 SCHIZOPHRENIA, UNSPECIFIED TYPE (H): Primary | ICD-10-CM

## 2020-06-08 PROCEDURE — 90853 GROUP PSYCHOTHERAPY: CPT | Mod: GT | Performed by: PSYCHOLOGIST

## 2020-06-08 PROCEDURE — 90853 GROUP PSYCHOTHERAPY: CPT | Mod: 95 | Performed by: PSYCHOLOGIST

## 2020-06-08 PROCEDURE — 25000128 H RX IP 250 OP 636: Mod: ZF | Performed by: PSYCHIATRY & NEUROLOGY

## 2020-06-08 PROCEDURE — 96372 THER/PROPH/DIAG INJ SC/IM: CPT | Mod: ZF

## 2020-06-08 RX ADMIN — PALIPERIDONE PALMITATE 156 MG: 156 INJECTION INTRAMUSCULAR at 09:30

## 2020-06-08 NOTE — GROUP NOTE
Psychotherapy Group Note  Telemedicine Visit: The patient's condition can be safely assessed and treated via synchronous audio and visual telemedicine encounter.      Reason for Telemedicine Visit: Patient has requested telehealth visit    Originating Site (Patient Location): Patient's home    Distant Site (Provider Location): [facility]: [facility]    Consent:  The patient/guardian has verbally consented to: the potential risks and benefits of telemedicine (video visit) versus in person care; bill my insurance or make self-payment for services provided; and responsibility for payment of non-covered services.     Mode of Communication:  Video Conference via [employer]    As the provider I attest to compliance with applicable laws and regulations related to telemedicine.    Diagnosis  Schizophrenia    First Episode Psychiatry [facility]  Psychiatrist: Dr Guzman    [phone]    PATIENT'S NAME: Jasen Guillen III  MRN:   [id number]  :   1995  ACCT. NUMBER: [id number]  DATE OF SERVICE: 20  START TIME: 10:00 AM  END TIME: 10:50 AM  FACILITATOR: Linda Chu PsyD  TOPIC:  EBP Group: Symptom Awareness  Adult Mental Health Day Treatment  TRACK: 2A    NUMBER OF PARTICIPANTS: 6    Summary of Group / Topics Discussed:  Symptom Awareness: Mood Disorders: Patients received a general overview of mood disorders including depressive disorders, anxiety disorders, and bipolar disorders and how it relates to their current symptoms. The purpose is to promote understanding of their diagnoses and how it impacts their functioning. Patients reviewed their current awareness of symptoms and diagnoses. Patients received information regarding diagnoses, etiology, cultural, and environmental factors as well as impact on functioning.     Patient Session Goals / Objectives:    Discussed patient individual symptoms and experiences    Reviewed diagnostic criteria and etiology of diagnoses         Patient Participation  / Response:  Moderately participated, sharing some personal reflections / insights and adequately adequately received / provided feedback with other participants.    Demonstrated understanding of how information regarding symptoms can assist in management of symptoms    Treatment Plan:  Patient has a current master individualized treatment plan and today was our weekly review of the patient's progress.  See Epic treatment plan for progress / updates on goals and plan.    Dionisio Crandall Psy., D,  Licensed Psychologist

## 2020-06-08 NOTE — NURSING NOTE
Clinic Administered Medication Documentation      Injectable Medication Documentation    Patient was given Invega Sustenna 156 mg. Prior to medication administration, verified patients identity using patient s name and date of birth. Please see MAR and medication order for additional information. Patient instructed to stay in clinic after the injection but patient declined.      Was entire vial of medication used? Yes  Vial/Syringe: Syringe  Expiration Date:  10/2021  Was this medication supplied by the patient? No      Gato Guillen,  1995, presented to the clinic today at the request of Dr. Rios,  ordering provider for long-acting injectable Invega Sustenna 156 mg.    OBSERVATIONS:  1.  Appearance: Casually dressed in clean shorts, clean but wrinkled polo shirt and tennis shoes. Unkempt, messy hair. Pt apologized for being over an hour late, d/t pt overslept. Pt shared that his dad was flying in today and they will be leaving tomorrow for treatment in [place]. Pt shared that he is looking forward to getting better. Pt reports that he misses [place] and his family. Writer obtained pt signature on a Consent to Communicate form and JONNY for the tx facility in [place].   2.  Mood: Fair, more quiet than in previous appointments  3.  Affect: Flat  4.  Attitude: Fair  5.  Cooperation: Full  6.  Side Effects: Denied  7.  Education: None  8. Next appointment: Pt to start treatment in [place]. Pt will call if he returns to [place]  9. Location: Left Deltoid    The service provided today was rendered under the supervising provider of the day, Dr. Chavira, who was available for consultation as needed.

## 2020-06-08 NOTE — GROUP NOTE
"Process Group Note  Telemedicine Visit: The patient's condition can be safely assessed and treated via synchronous audio and visual telemedicine encounter.      Reason for Telemedicine Visit: Patient has requested telehealth visit    Originating Site (Patient Location): Patient's home    Distant Site (Provider Location): [facility]: OP    Consent:  The patient/guardian has verbally consented to: the potential risks and benefits of telemedicine (video visit) versus in person care; bill my insurance or make self-payment for services provided; and responsibility for payment of non-covered services.     Mode of Communication:  Video Conference via [email or web address]    As the provider I attest to compliance with applicable laws and regulations related to telemedicine.    PATIENT'S NAME: Jasen Guillen III  MRN:   [id number]  :   1995  ACCT. NUMBER: [id number]  DATE OF SERVICE: 20  START TIME: 11:00 AM  END TIME: 11:50 AM  FACILITATOR: Linda Chu PsyD  TOPIC:  Process Group    Diagnoses:  Diagnosis  Schizophrenia    First Episode Psychiatry [facility]  Psychiatrist: Dr Guzman    [phone]      Adult Mental Health Day Treatment  TRACK: 2A    NUMBER OF PARTICIPANTS: 6          Data:    Session content: At the start of this group, patients were invited to check in by identifying themselves, describing their current emotional status, and identifying issues to address in this group.   Area(s) of treatment focus addressed in this session included Symptom Management, Personal Safety and Community Resources/Discharge Planning.  Client reported being safe today.  Reported mood is anxious.    Goal for today is to check on some apple trees that he planted, do chores around the house and get good sleep.    The client talked to the group about how he missed virtual [employer] yesterday and would like to see it. He reported that he \"wants to make people happy.\" He reported a decrease in some psychotic " symptoms, and that he messed up on his sleep. He reported problems with panic attacks, lately.      Therapeutic Interventions/Treatment Strategies:  Psychotherapist offered support, feedback and validation and reinforced use of skills. Treatment modalities used include Cognitive Behavioral Therapy and Dialectical Behavioral Therapy. Interventions include Cognitive Restructuring:  Assisted patient in formulating new neutral/positive alternatives to challenge less helpful / ineffective thoughts, Coping Skills: Facilitated discussion on learning and applying radical acceptance skill and Mindfulness: Encouraged a plan to use mindfulness skills in daily life.    Assessment:    Patient response:   Patient responded to session by accepting feedback, giving feedback, listening, focusing on goals and being attentive    Possible barriers to participation / learning include: severity of symptoms    Health Issues:   None reported       Substance Use Review:   Substance Use: No active concerns identified.    Mental Status/Behavioral Observations  Appearance:   NA  Eye Contact:   NA  Psychomotor Behavior: NA  Attitude:   Cooperative  Guarded   Orientation:   All  Speech   Rate / Production: Normal/ Responsive Normal    Volume:  Normal   Mood:    Anxious  Normal  Affect:    Constricted   Thought Content:   Rumination and Psychosis reports paranoia  Thought Form:  Coherent  Logical     Insight:    Good     Plan:     Safety Plan: Recommended that patient call 911 or go to the local ED should there be a change in any of these risk factors.     Barriers to treatment: None identified    Patient Contracts (see media tab):  None    Substance Use: Not addressed in session     Continue or Discharge: Patient will continue in Adult Day Treatment (ADT)  as planned. Patient is likely to benefit from learning and using skills as they work toward the goals identified in their treatment plan.      Linda Chu PsyD  June 8, 2020  Linda Ferreira  Roque Chu D,  Licensed Psychologist

## 2020-06-09 ENCOUNTER — TELEPHONE (OUTPATIENT)
Dept: PSYCHIATRY | Facility: CLINIC | Age: 25
End: 2020-06-09

## 2020-06-09 ENCOUNTER — TELEPHONE (OUTPATIENT)
Dept: BEHAVIORAL HEALTH | Facility: CLINIC | Age: 25
End: 2020-06-09

## 2020-06-09 NOTE — TELEPHONE ENCOUNTER
Phone call to Gato and his dad and a message was left to have Gato connect virtually with the group.    Yeni Torres., D,  Licensed Psychologist

## 2020-06-09 NOTE — TELEPHONE ENCOUNTER
First Episode Psychosis Program   Follow-Up  [facility]    Patient Name:  Jasen Guillen III  /Age:  1995 (24 year old)    Reason for Follow-Up:  Gato is moving to [place] to a residential treatment program called [facility].  Writer contacted this program to coordinate care.  Unable to reach the admissions coordinator TEQUILA Bryan with my contact information.      Faxed over initial assessment by Dr. Rios in 2017, and psychiatry visit and nursing notes from the last year.  (Fax [phone])    Will route to patient's psychiatric provider(s) as an FYI.  Please call or EPIC message with any questions or concerns.    HELIO Lombardi, [facility] CenterSW  [phone]

## 2020-06-09 NOTE — TELEPHONE ENCOUNTER
"On 6/8/2020, the patient signed JONNY, PHI with consent for email for [employer] [place], [place]. PHI communication with \"Staff at [employer]\", Dad (Gato) [phone], and sister - [phone]. In addition, communication between [facility] and [email or web address] at [email or web address] The original copies were sent to scanning and copies are held in Psych until scanning is complete.Sariah Rivers LPN      "

## 2020-06-16 NOTE — PROGRESS NOTES
First Episode Psychosis  Strengths Program   Young Adult Group  [facility]      Patient: Jasen Guillen III (1995)     MRN: [id number]  Date:  6/05/20  Diagnosis: Psychosis, Unspecified    Video- Visit Details  Type of service:  video visit for group therapy  Time of service:    Date:  6/05/20     Video Start Time:  3:00 PM        Video End Time:  4:00 PM    Reason for video visit:  Patient unable to travel due to Covid-19  Originating Site (patient location):  Friend or family home  Distant Site (provider location):  Remote location  Mode of Communication:  Video Conference via [employer]  Consent:  Patient has given verbal consent for video visit?: Yes     Number of Participants: 5  Group Time: 60 minutes  Facilitators: Howard Brizuela, Ph.D.; OZZY Lombardi; Anne Ibrahim, Psychology student;       The first episode group is based on the cognitive behavioral therapy model that uses psychoeducation, cognitive restructuring, problem solving techniques, and social skills.       Diagnostic criteria for group participation: History of psychosis      Group Topic for Today: Values      Description: Active Participant  Mr. Guillen was present in group today.  The patient actively participated in group discussion and patient demonstrated an appreciation of topic's application for their personal circumstances..  Jasen's emotional presentation was open and cooperative. Jasen's mood was elevated, and his affect was alert and congruent.     Plan: Continue with group goals to minimize impact of psychotic symptoms and maintain stability    OZZY Lombardi

## 2020-12-27 ENCOUNTER — HEALTH MAINTENANCE LETTER (OUTPATIENT)
Age: 25
End: 2020-12-27

## 2021-03-24 NOTE — PROGRESS NOTES
"Feeling much better.  Feels clearer more like himself. Had a brief return of eye spasms, but resolved now.      ABle to get back to classes, catching up on work.    Will get labs today.      PSYCHIATRY CLINIC PROGRESS NOTE     CC:  \"I am feeling much better, more like myself.\"    INTERIM HISTORY                                                   Jasen Guillen III is a 21 year old male with a diagnosis of schizophrenia (for past ~ 3 years) who comes in for a scheduled appointment today. We met for medication eval and management, as well as 20 minutes of supportive psychotherapy.    Patient had been receiving paliperidone injections, 234 mg q month, reduced to 156 mg 6 months ago. Overall, he tolerated this reduction well, with only occasional motor restlessness kenny resolves spontaneously. No ocular movements.    However, about one week ago he began to experience a sudden onset of repetitive pinching behavior of himself, and slight agitation. He had also been feeling somewhat down, and had stopped going to classes, where he began to fall behind.    I recommended that he begin paliperidone 3 mg orally every day for a few days, and use lorazepam 0.5 mg at bedtime at night. I also recommended that he re-start his sertraline.    This episode is not unlike an episode about 5 months ago, where pt had an ED visit about a week after the initial reduction of his injectable paliperidone for the following sx:  \"Patient notes that about 3:00 this afternoon he began to have strange noises coming from his voice states that his voice changes rapidly and especially when he is \"thinking about it\". He also felt compelled to do repetitive scratching of his left cheek.\"    Today, after instituting the changes described above, pt reports that things are going \"much better\".  Sleeping well, eating well, no self-scratching or self-pinching, has been able to go back to classes and get started on catching up on classwork.\"I feel more like " "myself.\" Mood feels \"good.\"    Remains interested in joining a [employer] order after graduation, as a novitiate, and is very excited about these plans. Has been visiting [employer], and found a good fit with a [employer] in [place]. Very interested in a contemplative life.    PSYCH ROS:    Positive Psychotic symptoms:      No recurrence of ideas of reference. Does not report any feelings of paranoia or being in danger.  Denies hallucinations or disorganized thinking.  No changes to his voice, no agitation, no scratching.    Negative psychotic symptoms:    Anhedonia and amotivation are resolved.  Patient engaged with roommates and social life.    Mood symptoms:     Sx of depression and anxiety remain stable.   No feelings of hopelessness or of self-harm. No SI.    Cognition:    Attention and concentration better. Back returning in classes.    Substance use: Not present.    Obsessive-compulsive symptoms: Not present    Motor symptoms:  Had brief episode of eye spasms once, but then completely resolved.    MEDICAL ROS:       Constitutional: No fever, fatigue.    Neurologic: Denies problems with salivation, muscle stiffness, motor restlessness. Eye spasms have resolved.  No dizziness.  HEENT: Mild dry mouth. No dry eyes.  Cardiovascular: No chest pain or palpitations.  Respiratory: No shortness of breath, dyspnea on exertion, or wheezing.  Gastrointestinal: No nausea or constipation  : No symptoms reported  Musculoskeletal:No musculoskeletal difficulties.  Skin: WNL.  Endocrine: No abnormalities.    PSYCHOSOCIAL HX:    Has a close relationship withroommates and friends in [employer] group-- is able to share his personal hx with them and get support.    Has been visiting [employer] and [employer] [facility] [employer]. Has felt most \"at home\" with the [employer].      PAST PSYCH HISTORY:    Received intensive treatment 3 years ago for an enduring episode of psychosis that was characterized predominantly by ideas of reference " "(feeling that he was being talked about  over the Internet) he also experienced disorganized thinking and disorganized behavior.    Of note, this patient's past andrecent presentation has been characterized by the presence of mild chronic dyskinetic movements, now much improved.    ALLERGY                                Review of patient's allergies indicates no known allergies.  MEDICATIONS                               Paliperidone 156 m IM q 28 days  Oral paliperidone 3 mg po every day recently added for episode described above, short-term  Lorazepam 0.5 mg po at bedtime as needed  Sertraline 100 mg po qhs       VITALS     /68  Pulse 65  Wt 88.6 kg (195 lb 6.4 oz)  MENTAL STATUS EXAM                                                             Orientation:  x4  Gait and Station:  Walks smoothly in squires, normal arm swing, normal gait.  No UE stifness.  Very mild fleeting dyskinetic movements observable in face and hands. No vocalizations. No scratching. No masked facies.  Alertness: Alert, oriented.  Appearance: Neatly and casually dressed.  Behavior/Demeanor: Calm, cooperative, pleasant, with excellent eye contact.  Appropriately interactive.  Speech: Normal rate and rhythm, normal volume.  Language: Language is cogent, descriptive of his activities  Psychomotor: Normal PM speed. No slowing observed.   Mood: \"good.\"  Affect:Full range, expressive, appropriate to content. SHows sense of humor-- smiles and uses some humor.  Thought Process/Associations: Linear, logical.  Thought Content: Denies delusional thoughts, ideas of reference, SI. HI.    Perception:  Denies hallucinations. No illusions.  Insight: Excellent.  Judgment: Excellent  Cognition:  Intact  Recent and Remote Memory: Intact  Attention Span and Concentration:  Intact during exam  Fund of Knowledge:excellent        DIAGNOSIS     Schizophrenia, now stable after brief flare-up about one week ago of motor/mood sx.  Brief episode of tic-like sx 5 " month sago that resolved well with a single dose of 0.5 mg lorazepam with no further recurrence.   Major depression, possible recent recurrence.  Extrapyramidal side effects (oculogyrc spasms), resolved spontaneously, no longer on Cogentin.     ASSESSMENT                                     Schizophrenia, doing well overall on current lower dose of Invega injectable, with a recent recurrence of tic-like self-pinching sx.  Has responded to short-term  addition of oral paliperidone, feels he can now stop this.    In terms of the depressive symptoms, the patient experienced some recurrence of sx about 12 months ago, and possibly again these past few weeks. Will consider whether he should go back on sertraline.    Also of note, his prior negative symptoms of lack of motivation, lack of energy, and anhedonia, now are resolved, especially on lower dose of medication. The patient reports that he is experiencing strong motivation and enjoyment and is espcially excited about his plans after college.    Cognitive symptoms:Cognition is greatly improved based on subjective self-report (patient recently participated in cognitive training study).       PLAN                                                                                                       1) Positive Psychotic Sx:   Continue on current regimen of reduced dose of injectable paliperidone 156 mg po q month, now has been on it ~7 months                Continue paliperidone 3 mg po every day for now, lower at next visit if pt remains stable.     No longer requiring Cogentin for EPS.    2) Negative psychotic symptoms:      Resolved.    3) Mood and anxiety symptoms:   Re-start sertraline.    4) Brief episode of tic-like sx:              Pt to continue to monitor. Has lorazepam tablets to use prn if these recur.    5)  RTC 5 weeks        TREATMENT RISK STATEMENT:  The risks, benefits, alternatives and potential adverse effects have been discussed and are understood by  "the patient/ patient's guardian. The pt understands the risks of using street drugs or alcohol.  There are no medical contraindications, the pt agrees to treatment with the ability to do so.  The patient understands to call 911 or come to the nearest ED if life threatening or urgent symptoms present.    PSYCHIATRY CLINIC INDIVIDUAL PSYCHOTHERAPY NOTE   Length of session: 20 minutes, 8:20 to 8:40.    Subjective: \"Things are much better today.\"   Plan:         Continue supportive psychotherapy during monthly medication management visits.         Psychotherapy services during this visit included  myself and Jasen Guillen.  Issues addressed in therapy included: [facility], catching up on class work.      TREATMENT  PLAN          SYMPTOMS;PROBLEMS   MEASURABLE GOALS;    FUNCTIONAL IMPROVEMENT INTERVENTIONS;    GAINS MADE DISCHARGE CRITERIA   Low energy, lack of motivation Patient will experience a significant improvement in energy and motivation, and will be able to engage in outsidesocializing activities as well as classwork Supportive Psychotherapy once per month    Patient enjoying active social life REsolved   Ideas of reference during times of stress Patient will no longer experience ideas of reference Monthly injectable paliperidone  CBT techniques  IOR improved after recent flare-up Two years without symptoms, followed by two years of monitoring without symptoms relapse   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      No tics         NOticed that urges to joiunt 2 thoughts flared then have decreased and now not present  Noticed that urges to vocalize swear words was present but then not acted uppon  No need for loraz or " oral paliperidone  Slight increase in restlessness and dyskinesieas-- Because I am excited   Tumor Depth: Less than 6mm from granular layer and no invasion beyond the subcutaneous fat

## 2021-04-24 ENCOUNTER — HEALTH MAINTENANCE LETTER (OUTPATIENT)
Age: 26
End: 2021-04-24

## 2021-10-09 ENCOUNTER — HEALTH MAINTENANCE LETTER (OUTPATIENT)
Age: 26
End: 2021-10-09

## 2022-05-16 ENCOUNTER — HEALTH MAINTENANCE LETTER (OUTPATIENT)
Age: 27
End: 2022-05-16

## 2022-09-11 ENCOUNTER — HEALTH MAINTENANCE LETTER (OUTPATIENT)
Age: 27
End: 2022-09-11

## 2023-06-03 ENCOUNTER — HEALTH MAINTENANCE LETTER (OUTPATIENT)
Age: 28
End: 2023-06-03

## 2024-09-08 NOTE — MR AVS SNAPSHOT
After Visit Summary   10/5/2018    Jasen Guillen III    MRN: [id number]           Patient Information     Date Of Birth          1995        Visit Information        Provider Department      10/5/2018 8:45 AM Lynnette Rios MD Psychiatry Clinic        Today's Diagnoses     Schizophrenia, unspecified type (H)    -  1       Follow-ups after your visit        Your next 10 appointments already scheduled     Oct 16, 2018 10:00 AM CDT   Nurse Visit with [facility] Psychiatry Nurse   Psychiatry Clinic ([facility])    [place] [id number] [address] [phone]            Nov 16, 2018  8:15 AM CST   First Episode Return with Lynnette Rios MD   Psychiatry Clinic ([facility])    [place] [id number] [address] [phone]              Who to contact     Please call your clinic at [phone] to:    Ask questions about your health    Make or cancel appointments    Discuss your medicines    Learn about your test results    Speak to your doctor            Additional Information About Your Visit        [facility] [employer] Information     [employer] gives you secure access to your electronic health record. If you see a primary care provider, you can also send messages to your care team and make appointments. If you have questions, please call your primary care clinic.  If you do not have a primary care provider, please call [phone] and they will assist you.      [employer] is an electronic gateway that provides easy, online access to your medical records. With [employer], you can request a clinic appointment, read your test results, renew a prescription or communicate with your care team.     To access your existing account, please contact your [facility] or call [phone] for assistance.        Care EveryWhere ID     This is your Care EveryWhere ID.  This could be used by other organizations to access your [place] medical records  [id number]        Your Vitals Were     Pulse                   81            Blood Pressure from Last 3 Encounters:   10/05/18 106/70   06/15/18 116/66   05/04/18 110/74    Weight from Last 3 Encounters:   10/05/18 89.6 kg (197 lb 9.6 oz)   06/15/18 86.3 kg (190 lb 3.2 oz)   05/04/18 85.2 kg (187 lb 12.8 oz)              Today, you had the following     No orders found for display       Primary Care Provider Fax #    Physician No Ref-Primary [phone]       No address on file        Equal Access to Services     ESTRELLITA CASTILLO : Giovany Ruby, apolinar bazzi, shelly russell, adrian guallpa . [facility] [phone].    ATENCIÓN: Si habla español, tiene a ventura disposición servicios gratuitos de asistencia lingüística. Llame al [phone].    We comply with applicable federal civil rights laws and [place] laws. We do not discriminate on the basis of race, color, national origin, age, disability, sex, sexual orientation, or gender identity.            Thank you!     Thank you for choosing PSYCHIATRY CLINIC  for your care. Our goal is always to provide you with excellent care. Hearing back from our patients is one way we can continue to improve our services. Please take a few minutes to complete the written survey that you may receive in the mail after your visit with us. Thank you!             Your Updated Medication List - Protect others around you: Learn how to safely use, store and throw away your medicines at www.disposemymeds.org.          This list is accurate as of 10/5/18 11:59 PM.  Always use your most recent med list.                   Brand Name Dispense Instructions for use Diagnosis    LORazepam 0.5 MG tablet    ATIVAN    10 tablet    Take 1 tablet (0.5 mg) by mouth every 8 hours as needed for anxiety        paliperidone 156 MG/ML Susp injection    INVEGA SUSTENNA    1  mL    Inject 1 mL (156 mg) into the muscle once for 1 dose        paliperidone 3 MG 24 hr tablet    INVEGA    6 tablet    Use 1 tab daily may use 2 tabs daily if needed        sertraline 100 MG tablet    ZOLOFT    45 tablet    Take 1.5 tablets (150 mg) by mouth daily    Depression, unspecified depression type       ULTRA OMEGA 3 1000 MG Caps     120 capsule    Take 2 capsules by mouth 2 times daily Nordic Naturals Ultimate Omega Brand. 1.1g EPA+DHA per 2 capsules. Pt to take 1.1g BID           08-Sep-2024 21:26